# Patient Record
Sex: MALE | Race: WHITE | NOT HISPANIC OR LATINO | Employment: OTHER | URBAN - METROPOLITAN AREA
[De-identification: names, ages, dates, MRNs, and addresses within clinical notes are randomized per-mention and may not be internally consistent; named-entity substitution may affect disease eponyms.]

---

## 2017-04-27 ENCOUNTER — AMBULATORY - HEALTHEAST (OUTPATIENT)
Dept: CARDIOLOGY | Facility: CLINIC | Age: 82
End: 2017-04-27

## 2017-04-27 DIAGNOSIS — I25.10 CAD (CORONARY ARTERY DISEASE): ICD-10-CM

## 2018-03-28 ENCOUNTER — RECORDS - HEALTHEAST (OUTPATIENT)
Dept: LAB | Facility: CLINIC | Age: 83
End: 2018-03-28

## 2018-03-28 LAB
ALBUMIN SERPL-MCNC: 3.3 G/DL (ref 3.5–5)
ALT SERPL W P-5'-P-CCNC: 13 U/L (ref 0–45)
AST SERPL W P-5'-P-CCNC: 18 U/L (ref 0–40)
C REACTIVE PROTEIN LHE: 0.1 MG/DL (ref 0–0.8)
CK SERPL-CCNC: 57 U/L (ref 30–190)
CREAT SERPL-MCNC: 1.03 MG/DL (ref 0.7–1.3)
ERYTHROCYTE [DISTWIDTH] IN BLOOD BY AUTOMATED COUNT: 12.7 % (ref 11–14.5)
ERYTHROCYTE [SEDIMENTATION RATE] IN BLOOD BY WESTERGREN METHOD: 18 MM/HR (ref 0–15)
GFR SERPL CREATININE-BSD FRML MDRD: >60 ML/MIN/1.73M2
HCT VFR BLD AUTO: 44.9 % (ref 40–54)
HGB BLD-MCNC: 14.7 G/DL (ref 14–18)
MCH RBC QN AUTO: 33 PG (ref 27–34)
MCHC RBC AUTO-ENTMCNC: 32.7 G/DL (ref 32–36)
MCV RBC AUTO: 101 FL (ref 80–100)
PLATELET # BLD AUTO: 188 THOU/UL (ref 140–440)
PMV BLD AUTO: 8.8 FL (ref 8.5–12.5)
RBC # BLD AUTO: 4.46 MILL/UL (ref 4.4–6.2)
WBC: 5.7 THOU/UL (ref 4–11)

## 2018-05-30 ENCOUNTER — RECORDS - HEALTHEAST (OUTPATIENT)
Dept: LAB | Facility: CLINIC | Age: 83
End: 2018-05-30

## 2018-05-30 LAB
25(OH)D3 SERPL-MCNC: 86.8 NG/ML (ref 30–80)
ALBUMIN SERPL-MCNC: 4 G/DL (ref 3.5–5)
ALT SERPL W P-5'-P-CCNC: 16 U/L (ref 0–45)
AST SERPL W P-5'-P-CCNC: 20 U/L (ref 0–40)
C REACTIVE PROTEIN LHE: <0.1 MG/DL (ref 0–0.8)
CK SERPL-CCNC: 56 U/L (ref 30–190)
CREAT SERPL-MCNC: 1.31 MG/DL (ref 0.7–1.3)
ERYTHROCYTE [DISTWIDTH] IN BLOOD BY AUTOMATED COUNT: 12.3 % (ref 11–14.5)
ERYTHROCYTE [SEDIMENTATION RATE] IN BLOOD BY WESTERGREN METHOD: 17 MM/HR (ref 0–15)
GFR SERPL CREATININE-BSD FRML MDRD: 53 ML/MIN/1.73M2
HCT VFR BLD AUTO: 47.3 % (ref 40–54)
HGB BLD-MCNC: 15.7 G/DL (ref 14–18)
MCH RBC QN AUTO: 33 PG (ref 27–34)
MCHC RBC AUTO-ENTMCNC: 33.2 G/DL (ref 32–36)
MCV RBC AUTO: 99 FL (ref 80–100)
PLATELET # BLD AUTO: 203 THOU/UL (ref 140–440)
PMV BLD AUTO: 9 FL (ref 8.5–12.5)
RBC # BLD AUTO: 4.76 MILL/UL (ref 4.4–6.2)
WBC: 6.3 THOU/UL (ref 4–11)

## 2018-05-31 LAB — ALDOLASE SERPL-CCNC: 5.3 U/L (ref 1.5–8.1)

## 2018-06-25 ENCOUNTER — RECORDS - HEALTHEAST (OUTPATIENT)
Dept: LAB | Facility: CLINIC | Age: 83
End: 2018-06-25

## 2018-06-25 LAB
ANION GAP SERPL CALCULATED.3IONS-SCNC: 13 MMOL/L (ref 5–18)
BUN SERPL-MCNC: 27 MG/DL (ref 8–28)
CALCIUM SERPL-MCNC: 10 MG/DL (ref 8.5–10.5)
CHLORIDE BLD-SCNC: 101 MMOL/L (ref 98–107)
CO2 SERPL-SCNC: 25 MMOL/L (ref 22–31)
CREAT SERPL-MCNC: 1.21 MG/DL (ref 0.7–1.3)
GFR SERPL CREATININE-BSD FRML MDRD: 58 ML/MIN/1.73M2
GLUCOSE BLD-MCNC: 141 MG/DL (ref 70–125)
POTASSIUM BLD-SCNC: 5 MMOL/L (ref 3.5–5)
PSA SERPL-MCNC: 0.6 NG/ML (ref 0–6.5)
SODIUM SERPL-SCNC: 139 MMOL/L (ref 136–145)

## 2018-07-24 ENCOUNTER — RECORDS - HEALTHEAST (OUTPATIENT)
Dept: LAB | Facility: CLINIC | Age: 83
End: 2018-07-24

## 2018-07-24 LAB
ALBUMIN SERPL-MCNC: 3.9 G/DL (ref 3.5–5)
ALP SERPL-CCNC: 74 U/L (ref 45–120)
ALT SERPL W P-5'-P-CCNC: 25 U/L (ref 0–45)
ANION GAP SERPL CALCULATED.3IONS-SCNC: 11 MMOL/L (ref 5–18)
AST SERPL W P-5'-P-CCNC: 20 U/L (ref 0–40)
BILIRUB SERPL-MCNC: 0.6 MG/DL (ref 0–1)
BUN SERPL-MCNC: 25 MG/DL (ref 8–28)
CALCIUM SERPL-MCNC: 9.9 MG/DL (ref 8.5–10.5)
CHLORIDE BLD-SCNC: 104 MMOL/L (ref 98–107)
CHOLEST SERPL-MCNC: 115 MG/DL
CO2 SERPL-SCNC: 26 MMOL/L (ref 22–31)
CREAT SERPL-MCNC: 1.2 MG/DL (ref 0.7–1.3)
FASTING STATUS PATIENT QL REPORTED: ABNORMAL
GFR SERPL CREATININE-BSD FRML MDRD: 58 ML/MIN/1.73M2
GLUCOSE BLD-MCNC: 158 MG/DL (ref 70–125)
HDLC SERPL-MCNC: 44 MG/DL
LDLC SERPL CALC-MCNC: 39 MG/DL
POTASSIUM BLD-SCNC: 4.6 MMOL/L (ref 3.5–5)
PROT SERPL-MCNC: 7 G/DL (ref 6–8)
SODIUM SERPL-SCNC: 141 MMOL/L (ref 136–145)
TRIGL SERPL-MCNC: 161 MG/DL
TSH SERPL DL<=0.005 MIU/L-ACNC: 1.43 UIU/ML (ref 0.3–5)

## 2018-08-23 ENCOUNTER — RECORDS - HEALTHEAST (OUTPATIENT)
Dept: ADMINISTRATIVE | Facility: OTHER | Age: 83
End: 2018-08-23

## 2018-08-28 ENCOUNTER — AMBULATORY - HEALTHEAST (OUTPATIENT)
Dept: CARDIOLOGY | Facility: CLINIC | Age: 83
End: 2018-08-28

## 2018-08-30 ENCOUNTER — OFFICE VISIT - HEALTHEAST (OUTPATIENT)
Dept: CARDIOLOGY | Facility: CLINIC | Age: 83
End: 2018-08-30

## 2018-08-30 DIAGNOSIS — E78.5 DYSLIPIDEMIA, GOAL LDL BELOW 70: ICD-10-CM

## 2018-08-30 DIAGNOSIS — I25.10 CORONARY ARTERY DISEASE DUE TO LIPID RICH PLAQUE: ICD-10-CM

## 2018-08-30 DIAGNOSIS — I25.83 CORONARY ARTERY DISEASE DUE TO LIPID RICH PLAQUE: ICD-10-CM

## 2018-08-30 ASSESSMENT — MIFFLIN-ST. JEOR: SCORE: 1370.42

## 2018-09-12 ENCOUNTER — RECORDS - HEALTHEAST (OUTPATIENT)
Dept: LAB | Facility: CLINIC | Age: 83
End: 2018-09-12

## 2018-09-12 LAB
ALBUMIN SERPL-MCNC: 3.7 G/DL (ref 3.5–5)
ALT SERPL W P-5'-P-CCNC: 15 U/L (ref 0–45)
AST SERPL W P-5'-P-CCNC: 17 U/L (ref 0–40)
C REACTIVE PROTEIN LHE: <0.1 MG/DL (ref 0–0.8)
CK SERPL-CCNC: 48 U/L (ref 30–190)
CREAT SERPL-MCNC: 1.22 MG/DL (ref 0.7–1.3)
ERYTHROCYTE [DISTWIDTH] IN BLOOD BY AUTOMATED COUNT: 13.1 % (ref 11–14.5)
ERYTHROCYTE [SEDIMENTATION RATE] IN BLOOD BY WESTERGREN METHOD: 12 MM/HR (ref 0–15)
GFR SERPL CREATININE-BSD FRML MDRD: 57 ML/MIN/1.73M2
HCT VFR BLD AUTO: 45.3 % (ref 40–54)
HGB BLD-MCNC: 14.5 G/DL (ref 14–18)
MCH RBC QN AUTO: 32.7 PG (ref 27–34)
MCHC RBC AUTO-ENTMCNC: 32 G/DL (ref 32–36)
MCV RBC AUTO: 102 FL (ref 80–100)
PLATELET # BLD AUTO: 188 THOU/UL (ref 140–440)
PMV BLD AUTO: 8.6 FL (ref 8.5–12.5)
RBC # BLD AUTO: 4.44 MILL/UL (ref 4.4–6.2)
WBC: 6.2 THOU/UL (ref 4–11)

## 2018-09-13 LAB — ALDOLASE SERPL-CCNC: 4.8 U/L (ref 1.5–8.1)

## 2018-11-27 ENCOUNTER — RECORDS - HEALTHEAST (OUTPATIENT)
Dept: LAB | Facility: CLINIC | Age: 83
End: 2018-11-27

## 2018-11-27 LAB
ALBUMIN SERPL-MCNC: 3.8 G/DL (ref 3.5–5)
ALT SERPL W P-5'-P-CCNC: 14 U/L (ref 0–45)
AST SERPL W P-5'-P-CCNC: 18 U/L (ref 0–40)
C REACTIVE PROTEIN LHE: 0.1 MG/DL (ref 0–0.8)
CK SERPL-CCNC: 73 U/L (ref 30–190)
CREAT SERPL-MCNC: 1.25 MG/DL (ref 0.7–1.3)
ERYTHROCYTE [DISTWIDTH] IN BLOOD BY AUTOMATED COUNT: 12.2 % (ref 11–14.5)
ERYTHROCYTE [SEDIMENTATION RATE] IN BLOOD BY WESTERGREN METHOD: 15 MM/HR (ref 0–15)
GFR SERPL CREATININE-BSD FRML MDRD: 55 ML/MIN/1.73M2
HCT VFR BLD AUTO: 44.8 % (ref 40–54)
HGB BLD-MCNC: 14.6 G/DL (ref 14–18)
MCH RBC QN AUTO: 33.2 PG (ref 27–34)
MCHC RBC AUTO-ENTMCNC: 32.6 G/DL (ref 32–36)
MCV RBC AUTO: 102 FL (ref 80–100)
PLATELET # BLD AUTO: 180 THOU/UL (ref 140–440)
PMV BLD AUTO: 8.6 FL (ref 8.5–12.5)
RBC # BLD AUTO: 4.4 MILL/UL (ref 4.4–6.2)
WBC: 4.9 THOU/UL (ref 4–11)

## 2018-11-28 LAB — ALDOLASE SERPL-CCNC: 2.8 U/L (ref 1.5–8.1)

## 2019-03-06 ENCOUNTER — RECORDS - HEALTHEAST (OUTPATIENT)
Dept: LAB | Facility: CLINIC | Age: 84
End: 2019-03-06

## 2019-03-06 LAB
ALBUMIN SERPL-MCNC: 3.7 G/DL (ref 3.5–5)
ALT SERPL W P-5'-P-CCNC: 20 U/L (ref 0–45)
AST SERPL W P-5'-P-CCNC: 20 U/L (ref 0–40)
C REACTIVE PROTEIN LHE: <0.1 MG/DL (ref 0–0.8)
CK SERPL-CCNC: 64 U/L (ref 30–190)
CREAT SERPL-MCNC: 1.16 MG/DL (ref 0.7–1.3)
ERYTHROCYTE [DISTWIDTH] IN BLOOD BY AUTOMATED COUNT: 12.4 % (ref 11–14.5)
ERYTHROCYTE [SEDIMENTATION RATE] IN BLOOD BY WESTERGREN METHOD: 14 MM/HR (ref 0–15)
GFR SERPL CREATININE-BSD FRML MDRD: 60 ML/MIN/1.73M2
HCT VFR BLD AUTO: 45.5 % (ref 40–54)
HGB BLD-MCNC: 14.7 G/DL (ref 14–18)
MCH RBC QN AUTO: 32.9 PG (ref 27–34)
MCHC RBC AUTO-ENTMCNC: 32.3 G/DL (ref 32–36)
MCV RBC AUTO: 102 FL (ref 80–100)
PLATELET # BLD AUTO: 209 THOU/UL (ref 140–440)
PMV BLD AUTO: 8.8 FL (ref 8.5–12.5)
RBC # BLD AUTO: 4.47 MILL/UL (ref 4.4–6.2)
WBC: 6.1 THOU/UL (ref 4–11)

## 2019-03-07 LAB — ALDOLASE SERPL-CCNC: 5.8 U/L (ref 1.5–8.1)

## 2019-06-10 ENCOUNTER — RECORDS - HEALTHEAST (OUTPATIENT)
Dept: LAB | Facility: CLINIC | Age: 84
End: 2019-06-10

## 2019-06-10 LAB
25(OH)D3 SERPL-MCNC: 73.6 NG/ML (ref 30–80)
ALBUMIN SERPL-MCNC: 3.6 G/DL (ref 3.5–5)
ALT SERPL W P-5'-P-CCNC: 16 U/L (ref 0–45)
AST SERPL W P-5'-P-CCNC: 18 U/L (ref 0–40)
C REACTIVE PROTEIN LHE: <0.1 MG/DL (ref 0–0.8)
CK SERPL-CCNC: 62 U/L (ref 30–190)
CREAT SERPL-MCNC: 0.97 MG/DL (ref 0.7–1.3)
ERYTHROCYTE [DISTWIDTH] IN BLOOD BY AUTOMATED COUNT: 13.4 % (ref 11–14.5)
ERYTHROCYTE [SEDIMENTATION RATE] IN BLOOD BY WESTERGREN METHOD: 13 MM/HR (ref 0–15)
GFR SERPL CREATININE-BSD FRML MDRD: >60 ML/MIN/1.73M2
HCT VFR BLD AUTO: 43.5 % (ref 40–54)
HGB BLD-MCNC: 14.7 G/DL (ref 14–18)
MCH RBC QN AUTO: 33.1 PG (ref 27–34)
MCHC RBC AUTO-ENTMCNC: 33.8 G/DL (ref 32–36)
MCV RBC AUTO: 98 FL (ref 80–100)
PLATELET # BLD AUTO: 219 THOU/UL (ref 140–440)
PMV BLD AUTO: 7.1 FL (ref 7–10)
RBC # BLD AUTO: 4.44 MILL/UL (ref 4.4–6.2)
WBC: 6.5 THOU/UL (ref 4–11)

## 2019-06-11 LAB — ALDOLASE SERPL-CCNC: 1.6 U/L (ref 1.5–8.1)

## 2019-07-25 ENCOUNTER — COMMUNICATION - HEALTHEAST (OUTPATIENT)
Dept: ADMINISTRATIVE | Facility: CLINIC | Age: 84
End: 2019-07-25

## 2019-09-11 ENCOUNTER — RECORDS - HEALTHEAST (OUTPATIENT)
Dept: LAB | Facility: CLINIC | Age: 84
End: 2019-09-11

## 2019-09-11 LAB
ALBUMIN SERPL-MCNC: 3.8 G/DL (ref 3.5–5)
ALT SERPL W P-5'-P-CCNC: 11 U/L (ref 0–45)
AST SERPL W P-5'-P-CCNC: 19 U/L (ref 0–40)
C REACTIVE PROTEIN LHE: <0.1 MG/DL (ref 0–0.8)
CK SERPL-CCNC: 47 U/L (ref 30–190)
CREAT SERPL-MCNC: 1.29 MG/DL (ref 0.7–1.3)
ERYTHROCYTE [DISTWIDTH] IN BLOOD BY AUTOMATED COUNT: 12.7 % (ref 11–14.5)
ERYTHROCYTE [SEDIMENTATION RATE] IN BLOOD BY WESTERGREN METHOD: 20 MM/HR (ref 0–15)
GFR SERPL CREATININE-BSD FRML MDRD: 53 ML/MIN/1.73M2
HCT VFR BLD AUTO: 44.9 % (ref 40–54)
HGB BLD-MCNC: 14.5 G/DL (ref 14–18)
MCH RBC QN AUTO: 33 PG (ref 27–34)
MCHC RBC AUTO-ENTMCNC: 32.3 G/DL (ref 32–36)
MCV RBC AUTO: 102 FL (ref 80–100)
PLATELET # BLD AUTO: 195 THOU/UL (ref 140–440)
PMV BLD AUTO: 9.3 FL (ref 8.5–12.5)
RBC # BLD AUTO: 4.39 MILL/UL (ref 4.4–6.2)
WBC: 5.9 THOU/UL (ref 4–11)

## 2019-09-12 ENCOUNTER — RECORDS - HEALTHEAST (OUTPATIENT)
Dept: LAB | Facility: CLINIC | Age: 84
End: 2019-09-12

## 2019-09-12 LAB
ALBUMIN SERPL-MCNC: 3.8 G/DL (ref 3.5–5)
ALDOLASE SERPL-CCNC: 3.9 U/L (ref 1.5–8.1)
ALP SERPL-CCNC: 91 U/L (ref 45–120)
ALT SERPL W P-5'-P-CCNC: 19 U/L (ref 0–45)
ANION GAP SERPL CALCULATED.3IONS-SCNC: 9 MMOL/L (ref 5–18)
AST SERPL W P-5'-P-CCNC: 20 U/L (ref 0–40)
BILIRUB SERPL-MCNC: 0.6 MG/DL (ref 0–1)
BUN SERPL-MCNC: 30 MG/DL (ref 8–28)
CALCIUM SERPL-MCNC: 9.6 MG/DL (ref 8.5–10.5)
CHLORIDE BLD-SCNC: 105 MMOL/L (ref 98–107)
CHOLEST SERPL-MCNC: 100 MG/DL
CO2 SERPL-SCNC: 27 MMOL/L (ref 22–31)
CREAT SERPL-MCNC: 1.36 MG/DL (ref 0.7–1.3)
FASTING STATUS PATIENT QL REPORTED: ABNORMAL
GFR SERPL CREATININE-BSD FRML MDRD: 50 ML/MIN/1.73M2
GLUCOSE BLD-MCNC: 128 MG/DL (ref 70–125)
HDLC SERPL-MCNC: 43 MG/DL
LDLC SERPL CALC-MCNC: 23 MG/DL
POTASSIUM BLD-SCNC: 4.5 MMOL/L (ref 3.5–5)
PROT SERPL-MCNC: 6.8 G/DL (ref 6–8)
SODIUM SERPL-SCNC: 141 MMOL/L (ref 136–145)
TRIGL SERPL-MCNC: 172 MG/DL
TSH SERPL DL<=0.005 MIU/L-ACNC: 1.49 UIU/ML (ref 0.3–5)
VIT B12 SERPL-MCNC: 1154 PG/ML (ref 213–816)

## 2019-09-13 LAB — HBA1C MFR BLD: 6.1 % (ref 4.2–6.1)

## 2019-12-13 ENCOUNTER — RECORDS - HEALTHEAST (OUTPATIENT)
Dept: LAB | Facility: CLINIC | Age: 84
End: 2019-12-13

## 2019-12-13 LAB
ALBUMIN SERPL-MCNC: 3.8 G/DL (ref 3.5–5)
ALT SERPL W P-5'-P-CCNC: 16 U/L (ref 0–45)
AST SERPL W P-5'-P-CCNC: 19 U/L (ref 0–40)
C REACTIVE PROTEIN LHE: 0.1 MG/DL (ref 0–0.8)
CK SERPL-CCNC: 56 U/L (ref 30–190)
CREAT SERPL-MCNC: 1.25 MG/DL (ref 0.7–1.3)
ERYTHROCYTE [DISTWIDTH] IN BLOOD BY AUTOMATED COUNT: 13 % (ref 11–14.5)
ERYTHROCYTE [SEDIMENTATION RATE] IN BLOOD BY WESTERGREN METHOD: 16 MM/HR (ref 0–15)
GFR SERPL CREATININE-BSD FRML MDRD: 55 ML/MIN/1.73M2
HCT VFR BLD AUTO: 45.2 % (ref 40–54)
HGB BLD-MCNC: 14.4 G/DL (ref 14–18)
MCH RBC QN AUTO: 32.7 PG (ref 27–34)
MCHC RBC AUTO-ENTMCNC: 31.9 G/DL (ref 32–36)
MCV RBC AUTO: 103 FL (ref 80–100)
PLATELET # BLD AUTO: 204 THOU/UL (ref 140–440)
PMV BLD AUTO: 8.7 FL (ref 8.5–12.5)
RBC # BLD AUTO: 4.41 MILL/UL (ref 4.4–6.2)
WBC: 6 THOU/UL (ref 4–11)

## 2019-12-14 LAB — ALDOLASE SERPL-CCNC: 4 U/L (ref 1.5–8.1)

## 2020-01-30 ENCOUNTER — RECORDS - HEALTHEAST (OUTPATIENT)
Dept: LAB | Facility: CLINIC | Age: 85
End: 2020-01-30

## 2020-01-30 LAB — C REACTIVE PROTEIN LHE: <0.1 MG/DL (ref 0–0.8)

## 2020-03-10 ENCOUNTER — RECORDS - HEALTHEAST (OUTPATIENT)
Dept: LAB | Facility: CLINIC | Age: 85
End: 2020-03-10

## 2020-03-10 LAB
ANION GAP SERPL CALCULATED.3IONS-SCNC: 11 MMOL/L (ref 5–18)
BUN SERPL-MCNC: 28 MG/DL (ref 8–28)
CALCIUM SERPL-MCNC: 10.2 MG/DL (ref 8.5–10.5)
CHLORIDE BLD-SCNC: 103 MMOL/L (ref 98–107)
CO2 SERPL-SCNC: 28 MMOL/L (ref 22–31)
CREAT SERPL-MCNC: 1.13 MG/DL (ref 0.7–1.3)
GFR SERPL CREATININE-BSD FRML MDRD: >60 ML/MIN/1.73M2
GLUCOSE BLD-MCNC: 112 MG/DL (ref 70–125)
POTASSIUM BLD-SCNC: 4.5 MMOL/L (ref 3.5–5)
SODIUM SERPL-SCNC: 142 MMOL/L (ref 136–145)

## 2020-11-02 ENCOUNTER — RECORDS - HEALTHEAST (OUTPATIENT)
Dept: LAB | Facility: CLINIC | Age: 85
End: 2020-11-02

## 2020-11-02 LAB
ALBUMIN SERPL-MCNC: 3.6 G/DL (ref 3.5–5)
ALP SERPL-CCNC: 71 U/L (ref 45–120)
ALT SERPL W P-5'-P-CCNC: 12 U/L (ref 0–45)
ANION GAP SERPL CALCULATED.3IONS-SCNC: 9 MMOL/L (ref 5–18)
AST SERPL W P-5'-P-CCNC: 17 U/L (ref 0–40)
BILIRUB SERPL-MCNC: 0.5 MG/DL (ref 0–1)
BUN SERPL-MCNC: 24 MG/DL (ref 8–28)
CALCIUM SERPL-MCNC: 9.8 MG/DL (ref 8.5–10.5)
CHLORIDE BLD-SCNC: 105 MMOL/L (ref 98–107)
CHOLEST SERPL-MCNC: 107 MG/DL
CO2 SERPL-SCNC: 29 MMOL/L (ref 22–31)
CREAT SERPL-MCNC: 1.12 MG/DL (ref 0.7–1.3)
FASTING STATUS PATIENT QL REPORTED: NORMAL
GFR SERPL CREATININE-BSD FRML MDRD: >60 ML/MIN/1.73M2
GLUCOSE BLD-MCNC: 106 MG/DL (ref 70–125)
HDLC SERPL-MCNC: 44 MG/DL
LDLC SERPL CALC-MCNC: 42 MG/DL
POTASSIUM BLD-SCNC: 5.2 MMOL/L (ref 3.5–5)
PROT SERPL-MCNC: 6.5 G/DL (ref 6–8)
SODIUM SERPL-SCNC: 143 MMOL/L (ref 136–145)
TRIGL SERPL-MCNC: 103 MG/DL

## 2021-06-01 VITALS — BODY MASS INDEX: 25.11 KG/M2 | HEIGHT: 67 IN | WEIGHT: 160 LBS

## 2021-06-03 ENCOUNTER — RECORDS - HEALTHEAST (OUTPATIENT)
Dept: ADMINISTRATIVE | Facility: CLINIC | Age: 86
End: 2021-06-03

## 2021-06-19 NOTE — LETTER
Letter by Chandler Crockett MD at      Author: Chandler Crockett MD Service: -- Author Type: --    Filed:  Encounter Date: 7/25/2019 Status: (Other)         Juan Tee  [address]  [facility] [place] [address]      July 25, 2019      Dear Juan,    This letter is to remind you that you will be due for your follow up appointment with Dr. Chandler Crockett  . To help ensure you are in the best health possible, a regular follow-up with your cardiologist is essential.     Please call our Patient Scheduling Line at [phone] to schedule your appointment at your earliest convenience.  If you have recently scheduled an appointment, please disregard this letter.    We look forward to seeing you again. As always, we are available at the number  above for any questions or concerns you may have.      Sincerely,     The Physicians and Staff of [facility] [place]

## 2021-06-26 NOTE — PROGRESS NOTES
Progress Notes by Chandler Crockett MD at 8/30/2018  8:30 AM     Author: Chandler Crockett MD Service: -- Author Type: Physician    Filed: 8/30/2018  9:03 AM Encounter Date: 8/30/2018 Status: Signed    : Chandler Crockett MD (Physician)           Click to link to [facility]     [facility] Clinic Note      Assessment:    1. Coronary artery disease due to lipid rich plaque     2. Dyslipidemia, goal LDL below 70         Plan:    1. Return to see Dr. Crockett in 1 year.    An After Visit Summary was printed and given to the patient.    Subjective:    Juan Tee returned for a post hospital follow up visit.  His niece, Luz Elena, accompanied him to the visit.    Juan was [facility] this [facility] with palpitations and lightheadedness.  Those symptoms have resolved.  I personally reviewed his nuclear stress test which continues to demonstrate evidence of low risk ischemia.  His ejection fraction was normal by nuclear scanning but apparently reduced by echocardiography.  Despite that his BNP level was normal for his age.  He has lost over 20 pounds of weight in the last few years and he attributes this to the fact that his sister-in-law used to give him her food when they went out to eat after she went to chemotherapy.  She has since passed away and he lives alone in her home.  His niece, Luz Elena, stops by for a few hours every day.    He still is under the care of Dr. Ziyad Liu for polymyositis.  We reviewed his medication list in the hospital thought he was taking 50 mg of spironolactone every morning.  The bottle from Dr. Liu states 12-1/2 mg once daily and Dr. Steward's notes indicate 12-1/2 mg twice daily.  Juan states he takes the spironolactone according to Dr. Steward's direction.  Therefore I corrected our [facility] medication list.    Otherwise, on system review Juan describes nocturia and occasional dizziness.  Does not report angina pectoris or  shortness of breath.    Past Medical History:    Patient Active Problem List   Diagnosis   ? Dyslipidemia, goal LDL below 70   ? Coronary artery disease due to lipid rich plaque   ? Typical Polymyositis (Type I)       Past Medical History:   Diagnosis Date   ? Coronary artery disease due to lipid rich plaque 2011    based on abnormal nuclear stress test     ? Dyslipidemia, goal LDL below 70 2011   ? Typical Polymyositis (Type I) 2000    sees Dr. Liu        Past Surgical History:   Procedure Laterality Date   ? [place] OPEN RX PATELLA FX      Treatment Of Knee Fracture Patellar, Open        reports that he quit smoking about 56 years ago. His smoking use included Cigarettes. He has a 8.00 pack-year smoking history. He has never used smokeless tobacco. He reports that he drinks alcohol. He reports that he does not use illicit drugs.    Family History   Problem Relation Age of Onset   ? Kidney failure Mother 54   ? Stroke Father 68   ? Sudden death Father 72      in the doctor's office   ? Congenital heart disease Brother 0      at 26   ? Acute Myocardial Infarction Brother 68   ? Coronary artery disease Brother    ? CABG Brother 72   ? Rheumatic fever Sister    ? No Medical Problems Sister    ? No Medical Problems Sister    ? No Medical Problems Sister    ? No Medical Problems Sister        Outpatient Encounter Prescriptions as of 2018   Medication Sig Dispense Refill   ? aspirin 81 mg chewable tablet Chew 81 mg daily.     ? atorvastatin (LIPITOR) 20 MG tablet Take 20 mg by mouth bedtime.     ? azaTHIOprine (IMURAN) 50 mg tablet Take 50 mg by mouth daily.     ? CALCIUM ORAL Take 1 tablet by mouth 2 (two) times a day.     ? cholecalciferol, vitamin D3, 5,000 unit capsule Take 5,000 Units by mouth daily.     ? DOCOSAHEXANOIC ACID/EPA (FISH OIL ORAL) Take 1,200 mg by mouth daily.     ? FOLIC ACID/MULTIVIT-MIN/LUTEIN (CENTRUM SILVER ORAL) Take 1 tablet by mouth daily.     ? metoprolol tartrate  "(LOPRESSOR) 25 MG tablet Take 0.5 tablets (12.5 mg total) by mouth 2 (two) times a day. 30 tablet 0   ? spironolactone (ALDACTONE) 25 MG tablet Take 12.5 mg by mouth 2 (two) times a day at 9am and 6pm.      ? VIT C/E/ZN/COPPR/LUTEIN/ZEAXAN (PRESERVISION AREDS 2 ORAL) Take 1 capsule by mouth 2 (two) times a day.       No facility-administered encounter medications on file as of 8/30/2018.        Review of Systems:     General: Weight Loss  Eyes: WNL  Ears/Nose/Throat: WNL  Lungs: WNL  Heart: Leg Swelling  Stomach: WNL  Bladder: Frequent Urination at Night  Muscle/Joints: WNL  Skin: WNL  Nervous System: Dizziness  Mental Health: WNL     Blood: WNL    Objective:     /66 (Patient Site: Left Arm, Patient Position: Sitting, Cuff Size: Adult Regular)  Pulse 80  Resp 16  Ht 5' 6.75\" (1.695 m)  Wt 160 lb (72.6 kg)  BMI 25.25 kg/m2  Wt Readings from Last 5 Encounters:   08/30/18 160 lb (72.6 kg)   07/18/18 155 lb 8 oz (70.5 kg)   06/13/17 167 lb (75.8 kg)   05/27/16 178 lb (80.7 kg)   11/05/15 178 lb (80.7 kg)        Physical Exam:    GENERAL APPEARANCE: alert, no apparent distress  EYES: no scleral icterus  NECK: no carotid bruits or adenopathy, jugular venous pressure is less than 5 cm  CHEST: symmetric, the lungs are clear to auscultation  CARDIOVASCULAR: regular rhythm without murmur or gallop  EXTREMITIES: no cyanosis, clubbing; 2+ pitting lower leg edema  SKIN: no xanthelasma  NEUROLOGIC: he required the assistance of one to get on and off the examination table and walks slowly  PSYCHIATRIC: mood and affect are normal    Cardiac Testing:    EKG: Sinus rhythm, normal EKG per my personal review.    Echocardiogram:   Results for orders placed during the hospital encounter of 07/16/18   Echo Complete [ECH10] 07/17/2018    Narrative   When compared to the previous study dated 9/11/2013, there are changes   noted. Left ventricular systolic function has decreased.    Left ventricle ejection fraction is mildly " decreased. The calculated   left ventricular ejection fraction is 43%.    Normal left ventricular size.    Right Ventricle: The right ventricle is mildly dilated. The systolic   function appears mildly reduced.    Mild aortic regurgitation.           Results for orders placed during the hospital encounter of 07/16/18   NM Pharmacologic Stress Test [VKN071] 07/18/2018    Narrative   The pharmacologic nuclear stress test is abnormal.    There is a small area of ischemia in the inferior and apical segment(s)   of the left ventricle.    The patient is at a low risk of future cardiac ischemic events.    The left ventricular ejection fraction is 63%.    When compared to the images of 11/27/2015, the ischemic defect is   slightly larger.    The findings of this examination were communicated to the nursing staff   at [facility].          Imaging:    No results found.    Lab Results:    Lab Results   Component Value Date    CREATININE 1.20 07/24/2018    BUN 25 07/24/2018     07/24/2018    K 4.6 07/24/2018     07/24/2018    CO2 26 07/24/2018     Lab Results   Component Value Date    CHOL 115 07/24/2018    TRIG 161 (H) 07/24/2018    HDL 44 07/24/2018    LDLDIRECT 37 11/05/2015     BNP (pg/mL)   Date Value   07/16/2018 112 (H)   05/04/2016 108 (H)   11/05/2015 136 (H)     Creatinine (mg/dL)   Date Value   07/24/2018 1.20   07/16/2018 1.25   06/25/2018 1.21   05/30/2018 1.31 (H)     Magnesium (mg/dL)   Date Value   07/16/2018 1.9     LDL Calculated (mg/dL)   Date Value   07/24/2018 39   11/01/2016 35   12/01/2015 49     Lab Results   Component Value Date    WBC 5.9 07/16/2018    WBC 4.7 07/07/2015    HGB 14.1 07/16/2018    HCT 42.2 07/16/2018    MCV 99 07/16/2018     07/16/2018           Much or all of the text in this note was generated through the use of the Dragon Dictate voice-to-text software. Errors in spelling or words which seem out of context are unintentional. Sound alike errors, in  particular, may have escaped editing.

## 2022-01-01 ENCOUNTER — APPOINTMENT (OUTPATIENT)
Dept: CT IMAGING | Facility: CLINIC | Age: 87
End: 2022-01-01
Attending: EMERGENCY MEDICINE
Payer: MEDICARE

## 2022-01-01 ENCOUNTER — APPOINTMENT (OUTPATIENT)
Dept: PHYSICAL THERAPY | Facility: CLINIC | Age: 87
End: 2022-01-01
Attending: INTERNAL MEDICINE
Payer: MEDICARE

## 2022-01-01 ENCOUNTER — TRANSITIONAL CARE UNIT VISIT (OUTPATIENT)
Dept: GERIATRICS | Facility: CLINIC | Age: 87
End: 2022-01-01
Payer: MEDICARE

## 2022-01-01 ENCOUNTER — APPOINTMENT (OUTPATIENT)
Dept: RADIOLOGY | Facility: CLINIC | Age: 87
End: 2022-01-01
Attending: EMERGENCY MEDICINE
Payer: MEDICARE

## 2022-01-01 ENCOUNTER — APPOINTMENT (OUTPATIENT)
Dept: OCCUPATIONAL THERAPY | Facility: CLINIC | Age: 87
End: 2022-01-01
Attending: INTERNAL MEDICINE
Payer: MEDICARE

## 2022-01-01 ENCOUNTER — APPOINTMENT (OUTPATIENT)
Dept: OCCUPATIONAL THERAPY | Facility: CLINIC | Age: 87
End: 2022-01-01
Payer: MEDICARE

## 2022-01-01 ENCOUNTER — HEALTH MAINTENANCE LETTER (OUTPATIENT)
Age: 87
End: 2022-01-01

## 2022-01-01 ENCOUNTER — HOSPITAL ENCOUNTER (OUTPATIENT)
Facility: CLINIC | Age: 87
Setting detail: OBSERVATION
Discharge: NURSING, REHAB OR LONG-TERM CARE | End: 2022-12-22
Attending: EMERGENCY MEDICINE | Admitting: INTERNAL MEDICINE
Payer: MEDICARE

## 2022-01-01 ENCOUNTER — APPOINTMENT (OUTPATIENT)
Dept: PHYSICAL THERAPY | Facility: CLINIC | Age: 87
End: 2022-01-01
Payer: MEDICARE

## 2022-01-01 ENCOUNTER — DOCUMENTATION ONLY (OUTPATIENT)
Dept: OTHER | Facility: CLINIC | Age: 87
End: 2022-01-01

## 2022-01-01 VITALS
RESPIRATION RATE: 16 BRPM | HEART RATE: 58 BPM | BODY MASS INDEX: 26.56 KG/M2 | TEMPERATURE: 97.4 F | WEIGHT: 149.91 LBS | OXYGEN SATURATION: 95 % | HEIGHT: 63 IN | SYSTOLIC BLOOD PRESSURE: 127 MMHG | DIASTOLIC BLOOD PRESSURE: 59 MMHG

## 2022-01-01 VITALS
HEIGHT: 66 IN | TEMPERATURE: 97 F | WEIGHT: 155 LBS | DIASTOLIC BLOOD PRESSURE: 66 MMHG | SYSTOLIC BLOOD PRESSURE: 135 MMHG | OXYGEN SATURATION: 98 % | HEART RATE: 64 BPM | RESPIRATION RATE: 16 BRPM | BODY MASS INDEX: 24.91 KG/M2

## 2022-01-01 DIAGNOSIS — F02.818 LEWY BODY DEMENTIA WITH OTHER BEHAVIORAL DISTURBANCE, UNSPECIFIED DEMENTIA SEVERITY (H): ICD-10-CM

## 2022-01-01 DIAGNOSIS — F03.918 SENILE DEMENTIA, WITH BEHAVIORAL DISTURBANCE (H): ICD-10-CM

## 2022-01-01 DIAGNOSIS — G20.A1 PARKINSONS DISEASE (H): ICD-10-CM
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
## 2022-01-01 DIAGNOSIS — K59.00 CONSTIPATION, UNSPECIFIED CONSTIPATION TYPE: Primary | ICD-10-CM

## 2022-01-01 DIAGNOSIS — R53.81 PHYSICAL DECONDITIONING: ICD-10-CM

## 2022-01-01 DIAGNOSIS — N18.2 CKD (CHRONIC KIDNEY DISEASE) STAGE 2, GFR 60-89 ML/MIN: ICD-10-CM

## 2022-01-01 DIAGNOSIS — Z74.09 IMPAIRED MOBILITY AND ACTIVITIES OF DAILY LIVING: ICD-10-CM

## 2022-01-01 DIAGNOSIS — G31.83 LEWY BODY DEMENTIA WITH OTHER BEHAVIORAL DISTURBANCE, UNSPECIFIED DEMENTIA SEVERITY (H): ICD-10-CM
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
## 2022-01-01 DIAGNOSIS — Z78.9 IMPAIRED MOBILITY AND ACTIVITIES OF DAILY LIVING: ICD-10-CM

## 2022-01-01 DIAGNOSIS — G20.A1 PARKINSON'S DISEASE (H): ICD-10-CM

## 2022-01-01 DIAGNOSIS — S70.01XA: ICD-10-CM

## 2022-01-01 DIAGNOSIS — M25.551 HIP PAIN, RIGHT: ICD-10-CM

## 2022-01-01 LAB
ALBUMIN UR-MCNC: NEGATIVE MG/DL
ANION GAP SERPL CALCULATED.3IONS-SCNC: 5 MMOL/L (ref 5–18)
ANION GAP SERPL CALCULATED.3IONS-SCNC: 8 MMOL/L (ref 5–18)
APPEARANCE UR: CLEAR
BASOPHILS # BLD AUTO: 0 10E3/UL (ref 0–0.2)
BASOPHILS NFR BLD AUTO: 0 %
BILIRUB UR QL STRIP: NEGATIVE
BUN SERPL-MCNC: 25 MG/DL (ref 8–28)
BUN SERPL-MCNC: 26 MG/DL (ref 8–28)
CALCIUM SERPL-MCNC: 9.1 MG/DL (ref 8.5–10.5)
CALCIUM SERPL-MCNC: 9.5 MG/DL (ref 8.5–10.5)
CHLORIDE BLD-SCNC: 104 MMOL/L (ref 98–107)
CHLORIDE BLD-SCNC: 105 MMOL/L (ref 98–107)
CO2 SERPL-SCNC: 28 MMOL/L (ref 22–31)
CO2 SERPL-SCNC: 32 MMOL/L (ref 22–31)
COLOR UR AUTO: YELLOW
CREAT SERPL-MCNC: 1.02 MG/DL (ref 0.7–1.3)
CREAT SERPL-MCNC: 1.21 MG/DL (ref 0.7–1.3)
EOSINOPHIL # BLD AUTO: 0.2 10E3/UL (ref 0–0.7)
EOSINOPHIL NFR BLD AUTO: 3 %
ERYTHROCYTE [DISTWIDTH] IN BLOOD BY AUTOMATED COUNT: 13.1 % (ref 10–15)
ERYTHROCYTE [DISTWIDTH] IN BLOOD BY AUTOMATED COUNT: 13.2 % (ref 10–15)
GFR SERPL CREATININE-BSD FRML MDRD: 58 ML/MIN/1.73M2
GFR SERPL CREATININE-BSD FRML MDRD: 72 ML/MIN/1.73M2
GLUCOSE BLD-MCNC: 119 MG/DL (ref 70–125)
GLUCOSE BLD-MCNC: 88 MG/DL (ref 70–125)
GLUCOSE BLDC GLUCOMTR-MCNC: 98 MG/DL (ref 70–99)
GLUCOSE UR STRIP-MCNC: NEGATIVE MG/DL
HCT VFR BLD AUTO: 38 % (ref 40–53)
HCT VFR BLD AUTO: 42 % (ref 40–53)
HGB BLD-MCNC: 12.1 G/DL (ref 13.3–17.7)
HGB BLD-MCNC: 13.3 G/DL (ref 13.3–17.7)
HGB UR QL STRIP: NEGATIVE
HOLD SPECIMEN: NORMAL
HYALINE CASTS: 14 /LPF
IMM GRANULOCYTES # BLD: 0 10E3/UL
IMM GRANULOCYTES NFR BLD: 0 %
KETONES UR STRIP-MCNC: NEGATIVE MG/DL
LEUKOCYTE ESTERASE UR QL STRIP: NEGATIVE
LYMPHOCYTES # BLD AUTO: 1.3 10E3/UL (ref 0.8–5.3)
LYMPHOCYTES NFR BLD AUTO: 23 %
MAGNESIUM SERPL-MCNC: 1.9 MG/DL (ref 1.8–2.6)
MCH RBC QN AUTO: 31.6 PG (ref 26.5–33)
MCH RBC QN AUTO: 31.7 PG (ref 26.5–33)
MCHC RBC AUTO-ENTMCNC: 31.7 G/DL (ref 31.5–36.5)
MCHC RBC AUTO-ENTMCNC: 31.8 G/DL (ref 31.5–36.5)
MCV RBC AUTO: 100 FL (ref 78–100)
MCV RBC AUTO: 99 FL (ref 78–100)
MONOCYTES # BLD AUTO: 0.6 10E3/UL (ref 0–1.3)
MONOCYTES NFR BLD AUTO: 11 %
MUCOUS THREADS #/AREA URNS LPF: PRESENT /LPF
NEUTROPHILS # BLD AUTO: 3.5 10E3/UL (ref 1.6–8.3)
NEUTROPHILS NFR BLD AUTO: 63 %
NITRATE UR QL: NEGATIVE
NRBC # BLD AUTO: 0 10E3/UL
NRBC BLD AUTO-RTO: 0 /100
PH UR STRIP: 6 [PH] (ref 5–7)
PLATELET # BLD AUTO: 203 10E3/UL (ref 150–450)
PLATELET # BLD AUTO: 213 10E3/UL (ref 150–450)
POTASSIUM BLD-SCNC: 4.2 MMOL/L (ref 3.5–5)
POTASSIUM BLD-SCNC: 4.3 MMOL/L (ref 3.5–5)
RBC # BLD AUTO: 3.83 10E6/UL (ref 4.4–5.9)
RBC # BLD AUTO: 4.2 10E6/UL (ref 4.4–5.9)
RBC URINE: 1 /HPF
SODIUM SERPL-SCNC: 141 MMOL/L (ref 136–145)
SODIUM SERPL-SCNC: 141 MMOL/L (ref 136–145)
SP GR UR STRIP: 1.02 (ref 1–1.03)
SQUAMOUS EPITHELIAL: <1 /HPF
UROBILINOGEN UR STRIP-MCNC: <2 MG/DL
WBC # BLD AUTO: 5.7 10E3/UL (ref 4–11)
WBC # BLD AUTO: 6.3 10E3/UL (ref 4–11)
WBC URINE: 2 /HPF

## 2022-01-01 PROCEDURE — 250N000013 HC RX MED GY IP 250 OP 250 PS 637: Performed by: INTERNAL MEDICINE

## 2022-01-01 PROCEDURE — 82310 ASSAY OF CALCIUM: CPT | Performed by: EMERGENCY MEDICINE

## 2022-01-01 PROCEDURE — 99225 PR SUBSEQUENT OBSERVATION CARE,LEVEL II: CPT | Performed by: INTERNAL MEDICINE

## 2022-01-01 PROCEDURE — G0378 HOSPITAL OBSERVATION PER HR: HCPCS

## 2022-01-01 PROCEDURE — 250N000012 HC RX MED GY IP 250 OP 636 PS 637: Performed by: INTERNAL MEDICINE

## 2022-01-01 PROCEDURE — G1010 CDSM STANSON: HCPCS

## 2022-01-01 PROCEDURE — 85025 COMPLETE CBC W/AUTO DIFF WBC: CPT | Performed by: EMERGENCY MEDICINE

## 2022-01-01 PROCEDURE — 250N000009 HC RX 250: Performed by: INTERNAL MEDICINE

## 2022-01-01 PROCEDURE — 83735 ASSAY OF MAGNESIUM: CPT | Performed by: EMERGENCY MEDICINE

## 2022-01-01 PROCEDURE — 71046 X-RAY EXAM CHEST 2 VIEWS: CPT

## 2022-01-01 PROCEDURE — 250N000013 HC RX MED GY IP 250 OP 250 PS 637: Performed by: EMERGENCY MEDICINE

## 2022-01-01 PROCEDURE — 99310 SBSQ NF CARE HIGH MDM 45: CPT | Performed by: PHYSICIAN ASSISTANT

## 2022-01-01 PROCEDURE — 97535 SELF CARE MNGMENT TRAINING: CPT | Mod: GO

## 2022-01-01 PROCEDURE — G0463 HOSPITAL OUTPT CLINIC VISIT: HCPCS

## 2022-01-01 PROCEDURE — 99219 PR INITIAL OBSERVATION CARE,LEVEL II: CPT | Performed by: INTERNAL MEDICINE

## 2022-01-01 PROCEDURE — 81001 URINALYSIS AUTO W/SCOPE: CPT | Performed by: EMERGENCY MEDICINE

## 2022-01-01 PROCEDURE — 97110 THERAPEUTIC EXERCISES: CPT | Mod: GP

## 2022-01-01 PROCEDURE — 97129 THER IVNTJ 1ST 15 MIN: CPT | Mod: GO

## 2022-01-01 PROCEDURE — 97165 OT EVAL LOW COMPLEX 30 MIN: CPT | Mod: GO

## 2022-01-01 PROCEDURE — 80048 BASIC METABOLIC PNL TOTAL CA: CPT | Performed by: INTERNAL MEDICINE

## 2022-01-01 PROCEDURE — 99217 PR OBSERVATION CARE DISCHARGE: CPT | Performed by: INTERNAL MEDICINE

## 2022-01-01 PROCEDURE — 73502 X-RAY EXAM HIP UNI 2-3 VIEWS: CPT

## 2022-01-01 PROCEDURE — 97116 GAIT TRAINING THERAPY: CPT | Mod: GP

## 2022-01-01 PROCEDURE — 97162 PT EVAL MOD COMPLEX 30 MIN: CPT | Mod: GP

## 2022-01-01 PROCEDURE — 85014 HEMATOCRIT: CPT | Performed by: INTERNAL MEDICINE

## 2022-01-01 PROCEDURE — 82962 GLUCOSE BLOOD TEST: CPT

## 2022-01-01 PROCEDURE — 36415 COLL VENOUS BLD VENIPUNCTURE: CPT | Performed by: EMERGENCY MEDICINE

## 2022-01-01 PROCEDURE — 36415 COLL VENOUS BLD VENIPUNCTURE: CPT | Performed by: INTERNAL MEDICINE

## 2022-01-01 PROCEDURE — 99285 EMERGENCY DEPT VISIT HI MDM: CPT | Mod: 25

## 2022-01-01 RX ORDER — LIDOCAINE 50 MG/G
OINTMENT TOPICAL 4 TIMES DAILY
Status: DISCONTINUED | OUTPATIENT
Start: 2022-01-01 | End: 2022-01-01 | Stop reason: HOSPADM

## 2022-01-01 RX ORDER — LIDOCAINE 50 MG/G
OINTMENT TOPICAL 4 TIMES DAILY
Qty: 50 G | Refills: 1 | Status: SHIPPED | OUTPATIENT
Start: 2022-01-01 | End: 2023-01-01

## 2022-01-01 RX ORDER — LIDOCAINE 40 MG/G
CREAM TOPICAL
Status: DISCONTINUED | OUTPATIENT
Start: 2022-01-01 | End: 2022-01-01 | Stop reason: HOSPADM

## 2022-01-01 RX ORDER — OLANZAPINE 5 MG/1
5 TABLET, ORALLY DISINTEGRATING ORAL AT BEDTIME
Qty: 30 TABLET | Refills: 0 | Status: SHIPPED | OUTPATIENT
Start: 2022-01-01 | End: 2023-01-01

## 2022-01-01 RX ORDER — FUROSEMIDE 20 MG
20 TABLET ORAL DAILY
Status: DISCONTINUED | OUTPATIENT
Start: 2022-01-01 | End: 2022-01-01

## 2022-01-01 RX ORDER — CARBIDOPA AND LEVODOPA 25; 100 MG/1; MG/1
1 TABLET ORAL 3 TIMES DAILY
Status: DISCONTINUED | OUTPATIENT
Start: 2022-01-01 | End: 2022-01-01

## 2022-01-01 RX ORDER — OLANZAPINE 5 MG/1
5 TABLET, ORALLY DISINTEGRATING ORAL AT BEDTIME
Status: DISCONTINUED | OUTPATIENT
Start: 2022-01-01 | End: 2022-01-01 | Stop reason: HOSPADM

## 2022-01-01 RX ORDER — ONDANSETRON 2 MG/ML
4 INJECTION INTRAMUSCULAR; INTRAVENOUS EVERY 6 HOURS PRN
Status: DISCONTINUED | OUTPATIENT
Start: 2022-01-01 | End: 2022-01-01 | Stop reason: HOSPADM

## 2022-01-01 RX ORDER — AMOXICILLIN 250 MG
2 CAPSULE ORAL 2 TIMES DAILY PRN
Qty: 30 TABLET | Refills: 1 | Status: SHIPPED | OUTPATIENT
Start: 2022-01-01

## 2022-01-01 RX ORDER — METOPROLOL SUCCINATE 25 MG/1
25 TABLET, EXTENDED RELEASE ORAL EVERY EVENING
Status: DISCONTINUED | OUTPATIENT
Start: 2022-01-01 | End: 2022-01-01 | Stop reason: HOSPADM

## 2022-01-01 RX ORDER — AMOXICILLIN 250 MG
2 CAPSULE ORAL 2 TIMES DAILY PRN
Status: DISCONTINUED | OUTPATIENT
Start: 2022-01-01 | End: 2022-01-01

## 2022-01-01 RX ORDER — CARBIDOPA AND LEVODOPA 25; 100 MG/1; MG/1
1 TABLET ORAL 3 TIMES DAILY
Status: DISCONTINUED | OUTPATIENT
Start: 2022-01-01 | End: 2022-01-01 | Stop reason: HOSPADM

## 2022-01-01 RX ORDER — CARBIDOPA AND LEVODOPA 25; 100 MG/1; MG/1
TABLET ORAL
Qty: 270 TABLET | Refills: 3 | Status: SHIPPED | OUTPATIENT
Start: 2022-01-01

## 2022-01-01 RX ORDER — ASPIRIN 81 MG/1
81 TABLET, CHEWABLE ORAL EVERY EVENING
Status: DISCONTINUED | OUTPATIENT
Start: 2022-01-01 | End: 2022-01-01 | Stop reason: HOSPADM

## 2022-01-01 RX ORDER — FUROSEMIDE 20 MG
20 TABLET ORAL DAILY
Status: DISCONTINUED | OUTPATIENT
Start: 2022-01-01 | End: 2022-01-01 | Stop reason: HOSPADM

## 2022-01-01 RX ORDER — AMOXICILLIN 250 MG
1 CAPSULE ORAL 2 TIMES DAILY
Status: DISCONTINUED | OUTPATIENT
Start: 2022-01-01 | End: 2022-01-01 | Stop reason: HOSPADM

## 2022-01-01 RX ORDER — AMOXICILLIN 250 MG
1 CAPSULE ORAL 2 TIMES DAILY PRN
Status: DISCONTINUED | OUTPATIENT
Start: 2022-01-01 | End: 2022-01-01

## 2022-01-01 RX ORDER — ACETAMINOPHEN 325 MG/1
975 TABLET ORAL EVERY 8 HOURS
Qty: 63 TABLET | Refills: 0 | Status: SHIPPED | OUTPATIENT
Start: 2022-01-01 | End: 2022-01-01

## 2022-01-01 RX ORDER — HALOPERIDOL 1 MG/1
2 TABLET ORAL EVERY 4 HOURS PRN
Status: DISCONTINUED | OUTPATIENT
Start: 2022-01-01 | End: 2022-01-01

## 2022-01-01 RX ORDER — ATORVASTATIN CALCIUM 10 MG/1
10 TABLET, FILM COATED ORAL EVERY EVENING
Status: DISCONTINUED | OUTPATIENT
Start: 2022-01-01 | End: 2022-01-01 | Stop reason: HOSPADM

## 2022-01-01 RX ORDER — POLYETHYLENE GLYCOL 3350 17 G/17G
17 POWDER, FOR SOLUTION ORAL DAILY PRN
Status: DISCONTINUED | OUTPATIENT
Start: 2022-01-01 | End: 2022-01-01 | Stop reason: HOSPADM

## 2022-01-01 RX ORDER — ONDANSETRON 4 MG/1
4 TABLET, ORALLY DISINTEGRATING ORAL EVERY 6 HOURS PRN
Status: DISCONTINUED | OUTPATIENT
Start: 2022-01-01 | End: 2022-01-01 | Stop reason: HOSPADM

## 2022-01-01 RX ORDER — BISACODYL 10 MG
10 SUPPOSITORY, RECTAL RECTAL DAILY PRN
Status: DISCONTINUED | OUTPATIENT
Start: 2022-01-01 | End: 2022-01-01 | Stop reason: HOSPADM

## 2022-01-01 RX ORDER — ACETAMINOPHEN 325 MG/1
975 TABLET ORAL EVERY 8 HOURS
Status: DISCONTINUED | OUTPATIENT
Start: 2022-01-01 | End: 2022-01-01 | Stop reason: HOSPADM

## 2022-01-01 RX ADMIN — OLANZAPINE 5 MG: 5 TABLET, ORALLY DISINTEGRATING ORAL at 20:05

## 2022-01-01 RX ADMIN — CARBIDOPA AND LEVODOPA 1 TABLET: 25; 100 TABLET ORAL at 11:45

## 2022-01-01 RX ADMIN — AZATHIOPRINE 25 MG: 50 TABLET ORAL at 08:03

## 2022-01-01 RX ADMIN — CARBIDOPA AND LEVODOPA 1 TABLET: 25; 100 TABLET ORAL at 07:57

## 2022-01-01 RX ADMIN — ACETAMINOPHEN 975 MG: 325 TABLET, FILM COATED ORAL at 18:35

## 2022-01-01 RX ADMIN — METOPROLOL SUCCINATE 25 MG: 25 TABLET, EXTENDED RELEASE ORAL at 20:29

## 2022-01-01 RX ADMIN — CARBIDOPA AND LEVODOPA 1 TABLET: 25; 100 TABLET ORAL at 13:36

## 2022-01-01 RX ADMIN — ACETAMINOPHEN 975 MG: 325 TABLET, FILM COATED ORAL at 16:38

## 2022-01-01 RX ADMIN — LIDOCAINE: 50 OINTMENT TOPICAL at 07:58

## 2022-01-01 RX ADMIN — FUROSEMIDE 20 MG: 20 TABLET ORAL at 11:45

## 2022-01-01 RX ADMIN — FUROSEMIDE 20 MG: 20 TABLET ORAL at 07:57

## 2022-01-01 RX ADMIN — CARBIDOPA AND LEVODOPA 1 TABLET: 25; 100 TABLET ORAL at 19:32

## 2022-01-01 RX ADMIN — LIDOCAINE: 50 OINTMENT TOPICAL at 12:10

## 2022-01-01 RX ADMIN — AZATHIOPRINE 25 MG: 50 TABLET ORAL at 11:45

## 2022-01-01 RX ADMIN — ACETAMINOPHEN 975 MG: 325 TABLET, FILM COATED ORAL at 00:27

## 2022-01-01 RX ADMIN — LIDOCAINE: 50 OINTMENT TOPICAL at 16:39

## 2022-01-01 RX ADMIN — ACETAMINOPHEN 975 MG: 325 TABLET, FILM COATED ORAL at 08:56

## 2022-01-01 RX ADMIN — Medication 1 MG: at 00:25

## 2022-01-01 RX ADMIN — ATORVASTATIN CALCIUM 10 MG: 10 TABLET, FILM COATED ORAL at 20:05

## 2022-01-01 RX ADMIN — METOPROLOL SUCCINATE 25 MG: 25 TABLET, EXTENDED RELEASE ORAL at 20:05

## 2022-01-01 RX ADMIN — LIDOCAINE: 50 OINTMENT TOPICAL at 20:29

## 2022-01-01 RX ADMIN — CARBIDOPA AND LEVODOPA 1 TABLET: 25; 100 TABLET ORAL at 20:29

## 2022-01-01 RX ADMIN — CARBIDOPA AND LEVODOPA 1 TABLET: 25; 100 TABLET ORAL at 20:06

## 2022-01-01 RX ADMIN — ATORVASTATIN CALCIUM 10 MG: 10 TABLET, FILM COATED ORAL at 19:32

## 2022-01-01 RX ADMIN — LIDOCAINE: 50 OINTMENT TOPICAL at 18:35

## 2022-01-01 RX ADMIN — LIDOCAINE: 50 OINTMENT TOPICAL at 12:14

## 2022-01-01 RX ADMIN — SENNOSIDES AND DOCUSATE SODIUM 1 TABLET: 50; 8.6 TABLET ORAL at 13:36

## 2022-01-01 RX ADMIN — METOPROLOL SUCCINATE 25 MG: 25 TABLET, EXTENDED RELEASE ORAL at 19:32

## 2022-01-01 RX ADMIN — ACETAMINOPHEN 975 MG: 325 TABLET, FILM COATED ORAL at 23:32

## 2022-01-01 RX ADMIN — CARBIDOPA AND LEVODOPA 1 TABLET: 25; 100 TABLET ORAL at 14:24

## 2022-01-01 RX ADMIN — ASPIRIN 81 MG 81 MG: 81 TABLET ORAL at 20:29

## 2022-01-01 RX ADMIN — SENNOSIDES AND DOCUSATE SODIUM 1 TABLET: 50; 8.6 TABLET ORAL at 08:00

## 2022-01-01 RX ADMIN — AZATHIOPRINE 25 MG: 50 TABLET ORAL at 07:58

## 2022-01-01 RX ADMIN — HALOPERIDOL 2 MG: 1 TABLET ORAL at 00:25

## 2022-01-01 RX ADMIN — CARBIDOPA AND LEVODOPA 1 TABLET: 25; 100 TABLET ORAL at 13:52

## 2022-01-01 RX ADMIN — ATORVASTATIN CALCIUM 10 MG: 10 TABLET, FILM COATED ORAL at 20:29

## 2022-01-01 RX ADMIN — ASPIRIN 81 MG 81 MG: 81 TABLET ORAL at 20:06

## 2022-01-01 RX ADMIN — ACETAMINOPHEN 975 MG: 325 TABLET, FILM COATED ORAL at 11:45

## 2022-01-01 RX ADMIN — LIDOCAINE: 50 OINTMENT TOPICAL at 19:32

## 2022-01-01 RX ADMIN — LIDOCAINE: 50 OINTMENT TOPICAL at 20:09

## 2022-01-01 RX ADMIN — ACETAMINOPHEN 975 MG: 325 TABLET, FILM COATED ORAL at 07:57

## 2022-01-01 ASSESSMENT — ACTIVITIES OF DAILY LIVING (ADL)
ADLS_ACUITY_SCORE: 43
ADLS_ACUITY_SCORE: 35
ADLS_ACUITY_SCORE: 43
ADLS_ACUITY_SCORE: 35
ADLS_ACUITY_SCORE: 43
ADLS_ACUITY_SCORE: 43
ADLS_ACUITY_SCORE: 35
ADLS_ACUITY_SCORE: 43
ADLS_ACUITY_SCORE: 31
ADLS_ACUITY_SCORE: 43
ADLS_ACUITY_SCORE: 43
ADLS_ACUITY_SCORE: 35
ADLS_ACUITY_SCORE: 43
ADLS_ACUITY_SCORE: 29
ADLS_ACUITY_SCORE: 43
ADLS_ACUITY_SCORE: 31
ADLS_ACUITY_SCORE: 31
ADLS_ACUITY_SCORE: 43
ADLS_ACUITY_SCORE: 38
ADLS_ACUITY_SCORE: 43
ADLS_ACUITY_SCORE: 38
ADLS_ACUITY_SCORE: 43
DEPENDENT_IADLS:: CLEANING;COOKING;LAUNDRY;SHOPPING;MEAL PREPARATION;MEDICATION MANAGEMENT;MONEY MANAGEMENT;TRANSPORTATION
ADLS_ACUITY_SCORE: 36
ADLS_ACUITY_SCORE: 36
ADLS_ACUITY_SCORE: 38
ADLS_ACUITY_SCORE: 36
ADLS_ACUITY_SCORE: 35
ADLS_ACUITY_SCORE: 38

## 2022-01-01 ASSESSMENT — ENCOUNTER SYMPTOMS
FATIGUE: 0
BACK PAIN: 0
TREMORS: 1
HEADACHES: 0
COUGH: 0
CONFUSION: 1
VOMITING: 0
RHINORRHEA: 0
DIFFICULTY URINATING: 0
ARTHRALGIAS: 1
DIAPHORESIS: 0
WOUND: 0
FEVER: 0
CHILLS: 0
HEMATURIA: 0
FREQUENCY: 0
NAUSEA: 0

## 2022-01-12 ENCOUNTER — HOSPITAL ENCOUNTER (INPATIENT)
Facility: HOSPITAL | Age: 87
LOS: 1 days | Discharge: HOME | DRG: 056 | End: 2022-01-14
Attending: EMERGENCY MEDICINE | Admitting: STUDENT IN AN ORGANIZED HEALTH CARE EDUCATION/TRAINING PROGRAM
Payer: MEDICARE

## 2022-01-12 ENCOUNTER — APPOINTMENT (OUTPATIENT)
Dept: CT IMAGING | Facility: HOSPITAL | Age: 87
DRG: 056 | End: 2022-01-12
Attending: EMERGENCY MEDICINE
Payer: MEDICARE

## 2022-01-12 ENCOUNTER — TRANSFERRED RECORDS (OUTPATIENT)
Dept: HEALTH INFORMATION MANAGEMENT | Facility: CLINIC | Age: 87
End: 2022-01-12

## 2022-01-12 ENCOUNTER — APPOINTMENT (OUTPATIENT)
Dept: RADIOLOGY | Facility: HOSPITAL | Age: 87
DRG: 056 | End: 2022-01-12
Attending: EMERGENCY MEDICINE
Payer: MEDICARE

## 2022-01-12 DIAGNOSIS — I89.0 LYMPHEDEMA OF BOTH LOWER EXTREMITIES: ICD-10-CM

## 2022-01-12 DIAGNOSIS — U07.1 INFECTION DUE TO 2019 NOVEL CORONAVIRUS: ICD-10-CM
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
## 2022-01-12 DIAGNOSIS — G47.00 INSOMNIA, UNSPECIFIED TYPE: ICD-10-CM

## 2022-01-12 DIAGNOSIS — R41.0 DELIRIUM: ICD-10-CM

## 2022-01-12 DIAGNOSIS — G20.A1 PARKINSON'S DISEASE (H): Primary | ICD-10-CM

## 2022-01-12 LAB
ALBUMIN UR-MCNC: NEGATIVE MG/DL
ANION GAP SERPL CALCULATED.3IONS-SCNC: 9 MMOL/L (ref 5–18)
APPEARANCE UR: CLEAR
ATRIAL RATE - MUSE: 71 BPM
BILIRUB UR QL STRIP: NEGATIVE
BUN SERPL-MCNC: 25 MG/DL (ref 8–28)
CALCIUM SERPL-MCNC: 9.7 MG/DL (ref 8.5–10.5)
CHLORIDE BLD-SCNC: 105 MMOL/L (ref 98–107)
CO2 SERPL-SCNC: 28 MMOL/L (ref 22–31)
COLOR UR AUTO: ABNORMAL
CREAT SERPL-MCNC: 0.99 MG/DL (ref 0.7–1.3)
DIASTOLIC BLOOD PRESSURE - MUSE: NORMAL MMHG
ERYTHROCYTE [DISTWIDTH] IN BLOOD BY AUTOMATED COUNT: 12.5 % (ref 10–15)
FLUAV RNA SPEC QL NAA+PROBE: NEGATIVE
FLUBV RNA RESP QL NAA+PROBE: NEGATIVE
GFR SERPL CREATININE-BSD FRML MDRD: 75 ML/MIN/1.73M2
GLUCOSE BLD-MCNC: 103 MG/DL (ref 70–125)
GLUCOSE UR STRIP-MCNC: NEGATIVE MG/DL
HCT VFR BLD AUTO: 41 % (ref 40–53)
HGB BLD-MCNC: 13 G/DL (ref 13.3–17.7)
HGB UR QL STRIP: NEGATIVE
INTERPRETATION ECG - MUSE: NORMAL
KETONES UR STRIP-MCNC: NEGATIVE MG/DL
LEUKOCYTE ESTERASE UR QL STRIP: NEGATIVE
MCH RBC QN AUTO: 32.7 PG (ref 26.5–33)
MCHC RBC AUTO-ENTMCNC: 31.7 G/DL (ref 31.5–36.5)
MCV RBC AUTO: 103 FL (ref 78–100)
MUCOUS THREADS #/AREA URNS LPF: PRESENT /LPF
NITRATE UR QL: NEGATIVE
P AXIS - MUSE: 9 DEGREES
PH UR STRIP: 6 [PH] (ref 5–7)
PLATELET # BLD AUTO: 209 10E3/UL (ref 150–450)
POTASSIUM BLD-SCNC: 4.4 MMOL/L (ref 3.5–5)
PR INTERVAL - MUSE: 158 MS
QRS DURATION - MUSE: 104 MS
QT - MUSE: 404 MS
QTC - MUSE: 439 MS
R AXIS - MUSE: -55 DEGREES
RBC # BLD AUTO: 3.98 10E6/UL (ref 4.4–5.9)
RBC URINE: <1 /HPF
SARS-COV-2 RNA RESP QL NAA+PROBE: POSITIVE
SODIUM SERPL-SCNC: 142 MMOL/L (ref 136–145)
SP GR UR STRIP: 1.02 (ref 1–1.03)
SQUAMOUS EPITHELIAL: <1 /HPF
SYSTOLIC BLOOD PRESSURE - MUSE: NORMAL MMHG
T AXIS - MUSE: 21 DEGREES
TROPONIN I SERPL-MCNC: <0.01 NG/ML (ref 0–0.29)
UROBILINOGEN UR STRIP-MCNC: <2 MG/DL
VENTRICULAR RATE- MUSE: 71 BPM
WBC # BLD AUTO: 6.2 10E3/UL (ref 4–11)
WBC URINE: 2 /HPF

## 2022-01-12 PROCEDURE — 258N000003 HC RX IP 258 OP 636: Performed by: EMERGENCY MEDICINE

## 2022-01-12 PROCEDURE — 81001 URINALYSIS AUTO W/SCOPE: CPT | Performed by: EMERGENCY MEDICINE

## 2022-01-12 PROCEDURE — 85027 COMPLETE CBC AUTOMATED: CPT | Performed by: EMERGENCY MEDICINE

## 2022-01-12 PROCEDURE — 93005 ELECTROCARDIOGRAM TRACING: CPT | Performed by: EMERGENCY MEDICINE

## 2022-01-12 PROCEDURE — 72125 CT NECK SPINE W/O DYE: CPT

## 2022-01-12 PROCEDURE — 71046 X-RAY EXAM CHEST 2 VIEWS: CPT

## 2022-01-12 PROCEDURE — 70450 CT HEAD/BRAIN W/O DYE: CPT

## 2022-01-12 PROCEDURE — 84443 ASSAY THYROID STIM HORMONE: CPT | Performed by: STUDENT IN AN ORGANIZED HEALTH CARE EDUCATION/TRAINING PROGRAM

## 2022-01-12 PROCEDURE — 36415 COLL VENOUS BLD VENIPUNCTURE: CPT | Performed by: EMERGENCY MEDICINE

## 2022-01-12 PROCEDURE — 82310 ASSAY OF CALCIUM: CPT | Performed by: EMERGENCY MEDICINE

## 2022-01-12 PROCEDURE — 87636 SARSCOV2 & INF A&B AMP PRB: CPT | Performed by: EMERGENCY MEDICINE

## 2022-01-12 PROCEDURE — 99285 EMERGENCY DEPT VISIT HI MDM: CPT | Mod: 25

## 2022-01-12 PROCEDURE — 96361 HYDRATE IV INFUSION ADD-ON: CPT

## 2022-01-12 PROCEDURE — 82248 BILIRUBIN DIRECT: CPT | Performed by: INTERNAL MEDICINE

## 2022-01-12 PROCEDURE — 84484 ASSAY OF TROPONIN QUANT: CPT | Performed by: EMERGENCY MEDICINE

## 2022-01-12 RX ADMIN — SODIUM CHLORIDE 500 ML: 9 INJECTION, SOLUTION INTRAVENOUS at 18:34

## 2022-01-12 NOTE — ED PROVIDER NOTES
ED Provider In Triage Note  [facility]  Encounter Date: Jan 12, 2022    Chief Complaint   Patient presents with     Altered Mental Status     New Onset A-Fib at Clinic?? Repeat here     Fall       Brief HPI:   Juan Tee is a 86 year old male presenting to the Emergency Department with a chief complaint of confusion for the past few days.  Daughter says confusion today is improving.  He has also had chills.  He was seen at a clinic today and sent to the ER for further eval.    Brief Physical Exam:  /70   Pulse 61   Temp 98.1  F (36.7  C)   Resp 16   SpO2 98%   General: Non-toxic appearing  HEENT: Atraumatic  Resp: No respiratory distress  Abdomen: Non-peritoneal  Neuro: Alert, oriented, answers questions appropriately  Psych: Behavior appropriate      Plan Initiated in Triage:  Orders Placed This Encounter     XR Chest 2 Views     CBC with platelets     Basic metabolic panel     Troponin I     UA with Microscopic reflex to Culture     0.9% sodium chloride BOLUS       PIT Dispo:   Return to [facility] while awaiting workup and ED bed availability    Ady Toure MD on 1/12/2022 at 4:45 PM    Patient was evaluated by the Physician in Triage due to a limitation of available rooms in the Emergency Department. A plan of care was discussed based on the information obtained on the initial evaluation and patient was consuled to return back to the Emergency Department lobby after this initial evalutaiton until results were obtained or a room became available in the Emergency Department. Patient was counseled not to leave prior to receiving the results of their workup.      Ady Toure MD  01/12/22 [id number]

## 2022-01-12 NOTE — ED TRIAGE NOTES
Pt comes in with family. First noticed confusion in patient Sunday. Came from the clinic, new onset afib. Falls x2 in the past 2 days - denies hitting head or thinners.Confusion slightly improved today. Tremors when sleeping.

## 2022-01-13 ENCOUNTER — APPOINTMENT (OUTPATIENT)
Dept: ULTRASOUND IMAGING | Facility: HOSPITAL | Age: 87
DRG: 056 | End: 2022-01-13
Attending: STUDENT IN AN ORGANIZED HEALTH CARE EDUCATION/TRAINING PROGRAM
Payer: MEDICARE

## 2022-01-13 ENCOUNTER — APPOINTMENT (OUTPATIENT)
Dept: OCCUPATIONAL THERAPY | Facility: HOSPITAL | Age: 87
DRG: 056 | End: 2022-01-13
Attending: STUDENT IN AN ORGANIZED HEALTH CARE EDUCATION/TRAINING PROGRAM
Payer: MEDICARE

## 2022-01-13 ENCOUNTER — APPOINTMENT (OUTPATIENT)
Dept: CARDIOLOGY | Facility: HOSPITAL | Age: 87
DRG: 056 | End: 2022-01-13
Attending: STUDENT IN AN ORGANIZED HEALTH CARE EDUCATION/TRAINING PROGRAM
Payer: MEDICARE

## 2022-01-13 ENCOUNTER — APPOINTMENT (OUTPATIENT)
Dept: PHYSICAL THERAPY | Facility: HOSPITAL | Age: 87
DRG: 056 | End: 2022-01-13
Attending: STUDENT IN AN ORGANIZED HEALTH CARE EDUCATION/TRAINING PROGRAM
Payer: MEDICARE
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
## 2022-01-13 PROBLEM — U07.1 INFECTION DUE TO 2019 NOVEL CORONAVIRUS: Status: ACTIVE | Noted: 2022-01-13

## 2022-01-13 PROBLEM — R41.0 DELIRIUM: Status: ACTIVE | Noted: 2022-01-13

## 2022-01-13 LAB
ALBUMIN SERPL-MCNC: 3.5 G/DL (ref 3.5–5)
ALP SERPL-CCNC: 84 U/L (ref 45–120)
ALT SERPL W P-5'-P-CCNC: 10 U/L (ref 0–45)
AST SERPL W P-5'-P-CCNC: 18 U/L (ref 0–40)
ATRIAL RATE - MUSE: 65 BPM
BILIRUB DIRECT SERPL-MCNC: 0.2 MG/DL
BILIRUB SERPL-MCNC: 0.5 MG/DL (ref 0–1)
DIASTOLIC BLOOD PRESSURE - MUSE: 98 MMHG
INTERPRETATION ECG - MUSE: NORMAL
LVEF ECHO: NORMAL
P AXIS - MUSE: 87 DEGREES
PR INTERVAL - MUSE: 150 MS
PROT SERPL-MCNC: 6.8 G/DL (ref 6–8)
QRS DURATION - MUSE: 108 MS
QT - MUSE: 446 MS
QTC - MUSE: 495 MS
R AXIS - MUSE: -49 DEGREES
SYSTOLIC BLOOD PRESSURE - MUSE: 145 MMHG
T AXIS - MUSE: 12 DEGREES
TSH SERPL DL<=0.005 MIU/L-ACNC: 1.49 UIU/ML (ref 0.3–5)
VENTRICULAR RATE- MUSE: 74 BPM

## 2022-01-13 PROCEDURE — 250N000013 HC RX MED GY IP 250 OP 250 PS 637: Performed by: STUDENT IN AN ORGANIZED HEALTH CARE EDUCATION/TRAINING PROGRAM

## 2022-01-13 PROCEDURE — 99222 1ST HOSP IP/OBS MODERATE 55: CPT | Performed by: PSYCHIATRY & NEUROLOGY

## 2022-01-13 PROCEDURE — 93970 EXTREMITY STUDY: CPT

## 2022-01-13 PROCEDURE — 97166 OT EVAL MOD COMPLEX 45 MIN: CPT | Mod: GO

## 2022-01-13 PROCEDURE — 120N000001 HC R&B MED SURG/OB

## 2022-01-13 PROCEDURE — 97161 PT EVAL LOW COMPLEX 20 MIN: CPT | Mod: GP | Performed by: PHYSICAL THERAPIST

## 2022-01-13 PROCEDURE — 999N000157 HC STATISTIC RCP TIME EA 10 MIN

## 2022-01-13 PROCEDURE — 93306 TTE W/DOPPLER COMPLETE: CPT | Mod: 26 | Performed by: INTERNAL MEDICINE

## 2022-01-13 PROCEDURE — 999N000208 ECHOCARDIOGRAM COMPLETE

## 2022-01-13 PROCEDURE — 97110 THERAPEUTIC EXERCISES: CPT | Mod: GP | Performed by: PHYSICAL THERAPIST

## 2022-01-13 PROCEDURE — 250N000013 HC RX MED GY IP 250 OP 250 PS 637: Performed by: INTERNAL MEDICINE

## 2022-01-13 PROCEDURE — 96374 THER/PROPH/DIAG INJ IV PUSH: CPT | Mod: 59

## 2022-01-13 PROCEDURE — 250N000011 HC RX IP 250 OP 636: Performed by: INTERNAL MEDICINE

## 2022-01-13 PROCEDURE — 97535 SELF CARE MNGMENT TRAINING: CPT | Mod: GO

## 2022-01-13 PROCEDURE — 250N000011 HC RX IP 250 OP 636: Performed by: STUDENT IN AN ORGANIZED HEALTH CARE EDUCATION/TRAINING PROGRAM

## 2022-01-13 PROCEDURE — 99222 1ST HOSP IP/OBS MODERATE 55: CPT | Mod: AI | Performed by: STUDENT IN AN ORGANIZED HEALTH CARE EDUCATION/TRAINING PROGRAM

## 2022-01-13 PROCEDURE — 255N000002 HC RX 255 OP 636: Performed by: STUDENT IN AN ORGANIZED HEALTH CARE EDUCATION/TRAINING PROGRAM

## 2022-01-13 RX ORDER — ATORVASTATIN CALCIUM 10 MG/1
10 TABLET, FILM COATED ORAL EVERY EVENING
Status: DISCONTINUED | OUTPATIENT
Start: 2022-01-13 | End: 2022-01-15 | Stop reason: HOSPADM

## 2022-01-13 RX ORDER — ACETAMINOPHEN 650 MG/1
650 SUPPOSITORY RECTAL EVERY 6 HOURS PRN
Status: DISCONTINUED | OUTPATIENT
Start: 2022-01-13 | End: 2022-01-15 | Stop reason: HOSPADM

## 2022-01-13 RX ORDER — SPIRONOLACTONE 25 MG/1
25 TABLET ORAL DAILY
Status: DISCONTINUED | OUTPATIENT
Start: 2022-01-13 | End: 2022-01-15 | Stop reason: HOSPADM

## 2022-01-13 RX ORDER — BISACODYL 10 MG
10 SUPPOSITORY, RECTAL RECTAL DAILY PRN
Status: DISCONTINUED | OUTPATIENT
Start: 2022-01-13 | End: 2022-01-15 | Stop reason: HOSPADM

## 2022-01-13 RX ORDER — METOPROLOL SUCCINATE 25 MG/1
25 TABLET, EXTENDED RELEASE ORAL EVERY EVENING
Status: DISCONTINUED | OUTPATIENT
Start: 2022-01-13 | End: 2022-01-15 | Stop reason: HOSPADM

## 2022-01-13 RX ORDER — LIDOCAINE 40 MG/G
CREAM TOPICAL
Status: DISCONTINUED | OUTPATIENT
Start: 2022-01-13 | End: 2022-01-15 | Stop reason: HOSPADM

## 2022-01-13 RX ORDER — LANOLIN ALCOHOL/MO/W.PET/CERES
3 CREAM (GRAM) TOPICAL
Status: DISCONTINUED | OUTPATIENT
Start: 2022-01-13 | End: 2022-01-15 | Stop reason: HOSPADM

## 2022-01-13 RX ORDER — FUROSEMIDE 10 MG/ML
40 INJECTION INTRAMUSCULAR; INTRAVENOUS ONCE
Status: COMPLETED | OUTPATIENT
Start: 2022-01-13 | End: 2022-01-13

## 2022-01-13 RX ORDER — POLYETHYLENE GLYCOL 3350 17 G/17G
17 POWDER, FOR SOLUTION ORAL DAILY PRN
Status: DISCONTINUED | OUTPATIENT
Start: 2022-01-13 | End: 2022-01-15 | Stop reason: HOSPADM

## 2022-01-13 RX ORDER — ACETAMINOPHEN 325 MG/1
650 TABLET ORAL EVERY 6 HOURS PRN
Status: DISCONTINUED | OUTPATIENT
Start: 2022-01-13 | End: 2022-01-15 | Stop reason: HOSPADM

## 2022-01-13 RX ORDER — ATORVASTATIN CALCIUM 10 MG/1
10 TABLET, FILM COATED ORAL EVERY EVENING
Status: ON HOLD | COMMUNITY
Start: 2021-10-28 | End: 2023-01-01

## 2022-01-13 RX ORDER — FUROSEMIDE 10 MG/ML
20 INJECTION INTRAMUSCULAR; INTRAVENOUS ONCE
Status: DISCONTINUED | OUTPATIENT
Start: 2022-01-13 | End: 2022-01-13

## 2022-01-13 RX ORDER — ASPIRIN 81 MG/1
81 TABLET, CHEWABLE ORAL EVERY EVENING
Status: DISCONTINUED | OUTPATIENT
Start: 2022-01-13 | End: 2022-01-15 | Stop reason: HOSPADM

## 2022-01-13 RX ORDER — METOPROLOL SUCCINATE 25 MG/1
25 TABLET, EXTENDED RELEASE ORAL EVERY EVENING
COMMUNITY
Start: 2021-10-28 | End: 2023-01-01

## 2022-01-13 RX ORDER — HYDRALAZINE HYDROCHLORIDE 20 MG/ML
10 INJECTION INTRAMUSCULAR; INTRAVENOUS EVERY 6 HOURS PRN
Status: DISCONTINUED | OUTPATIENT
Start: 2022-01-13 | End: 2022-01-15 | Stop reason: HOSPADM

## 2022-01-13 RX ADMIN — ASPIRIN 81 MG CHEWABLE TABLET 81 MG: 81 TABLET CHEWABLE at 20:17

## 2022-01-13 RX ADMIN — ENOXAPARIN SODIUM 40 MG: 40 INJECTION SUBCUTANEOUS at 10:00

## 2022-01-13 RX ADMIN — METOPROLOL TARTRATE 12.5 MG: 25 TABLET, FILM COATED ORAL at 10:00

## 2022-01-13 RX ADMIN — ATORVASTATIN CALCIUM 10 MG: 10 TABLET, FILM COATED ORAL at 20:20

## 2022-01-13 RX ADMIN — FUROSEMIDE 40 MG: 10 INJECTION, SOLUTION INTRAMUSCULAR; INTRAVENOUS at 15:31

## 2022-01-13 RX ADMIN — SPIRONOLACTONE 25 MG: 25 TABLET, FILM COATED ORAL at 12:04

## 2022-01-13 RX ADMIN — PERFLUTREN 3 ML: 6.52 INJECTION, SUSPENSION INTRAVENOUS at 11:56

## 2022-01-13 ASSESSMENT — ACTIVITIES OF DAILY LIVING (ADL)
ADLS_ACUITY_SCORE: 16
ADLS_ACUITY_SCORE: 16
ADLS_ACUITY_SCORE: 13
ADLS_ACUITY_SCORE: 16
ADLS_ACUITY_SCORE: 13
ADLS_ACUITY_SCORE: 12
ADLS_ACUITY_SCORE: 12
ADLS_ACUITY_SCORE: 13
ADLS_ACUITY_SCORE: 16
ADLS_ACUITY_SCORE: 13
ADLS_ACUITY_SCORE: 16
ADLS_ACUITY_SCORE: 12
ADLS_ACUITY_SCORE: 13
ADLS_ACUITY_SCORE: 12
ADLS_ACUITY_SCORE: 13

## 2022-01-13 NOTE — PHARMACY-ADMISSION MEDICATION HISTORY
Pharmacy Note - Admission Medication History    Pertinent Provider Information:      ______________________________________________________________________    Prior To Admission (PTA) med list completed and updated in EMR.       PTA Med List   Medication Sig Last Dose     aspirin 81 mg chewable tablet Take 81 mg by mouth every evening  1/11/2022 at PM     atorvastatin (LIPITOR) 10 MG tablet Take 10 mg by mouth every evening 1/11/2022 at PM     azaTHIOprine (IMURAN) 50 mg tablet Take 25 mg by mouth daily  1/12/2022 at AM     CALCIUM ORAL [CALCIUM ORAL] Take 1 tablet by mouth 2 (two) times a day. 1/12/2022 at AM     cholecalciferol, vitamin D3, 5,000 unit capsule [CHOLECALCIFEROL, VITAMIN D3, 5,000 UNIT CAPSULE] Take 5,000 Units by mouth daily. 1/12/2022 at AM     DOCOSAHEXANOIC ACID/EPA (FISH OIL ORAL) [DOCOSAHEXANOIC ACID/EPA (FISH OIL ORAL)] Take 1,200 mg by mouth daily. 1/12/2022 at AM     FOLIC ACID/MULTIVIT-MIN/LUTEIN (CENTRUM SILVER ORAL) [FOLIC ACID/MULTIVIT-MIN/LUTEIN (CENTRUM SILVER ORAL)] Take 1 tablet by mouth daily. 1/12/2022 at AM     metoprolol succinate ER (TOPROL-XL) 25 MG 24 hr tablet Take 25 mg by mouth every evening 1/11/2022 at PM     spironolactone (ALDACTONE) 25 MG tablet Take 25 mg by mouth daily  1/12/2022 at AM     VIT C/E/ZN/COPPR/LUTEIN/ZEAXAN (PRESERVISION AREDS 2 ORAL) [VIT C/E/ZN/COPPR/LUTEIN/ZEAXAN (PRESERVISION AREDS 2 ORAL)] Take 1 capsule by mouth 2 (two) times a day. 1/12/2022 at AM       Information source(s): Family member and CareEverywhere/SureScripts  Method of interview communication: phone    Summary of Changes to PTA Med List  New:   Discontinued:   Changed:     Patient was asked about OTC/herbal products specifically.  PTA med list reflects this.    In the past week, patient estimated taking medication this percent of the time:  greater than 90%.    Allergies were reviewed, assessed, and updated with the patient.      Patient does not use any multi-dose medications prior to  admission.    The information provided in this note is only as accurate as the sources available at the time of the update(s).    Thank you for the opportunity to participate in the care of this patient.    Karely Stearns PharmD.  1/13/2022 8:41 AM

## 2022-01-13 NOTE — CONSULTS
[facility]  Neurology Consultation    Patient Name:  Juan Tee  MRN:  [id number]    :  1935  Date of Service:  2022  Primary care provider:  Keith Steward      Neurology consultation service was asked to see Juan Tee by Dr. Fernando to evaluate confusion and hallucinations.    Chief Complaint:  hallucinations    History of Present Illness:   Juan Tee is a 86 year old male with very minimal past medical history, most notable for hypertension and hyperlipidemia who neurology is consulted for confusion and hallucinations.  History obtained through patient and his niece with whom he lives.  He states at baseline patient does very well.  She has not noticed significant cognitive impairment.  He is still helpful around the home and does not require significant cares.  However, in the past couple of years she has noticed a worsening tremor.  She thinks it is bilateral.  She has also noticed that his gait is slowed down and he is more stooped and shuffling.  She states 3 to 4 days ago he was having poor sleep.  The family all had COVID at that time.  They state he was speaking and moving in the sleep, which is not unusual but was much more prominent than normal.  4 days ago, they state he was significantly confused and seem to be hallucinating.  She states they brought him to the doctor yesterday who was concerned that he was in A. fib and sent him to the emergency room.    Patient corroborates much of this history.  He states he is feeling better yesterday and today.  Daughter confirms that he was much improved yesterday.  He denies any hallucinations.  He does not report any anosmia, but daughter does feel like he moves it during his sleep and acts out his dreams.  He denies depression, but reports constipation.  He admits that his handwriting is gotten smaller, but does not feel that his speech is  "changed.    Otherwise currently he denies headache, vision changes, weakness, numbness, chest pain, shortness of breath, fevers, chills        ROS  A comprehensive ROS was performed and pertinent findings were included in HPI.     PMH  No past medical history on file.  Past Surgical History:   Procedure Laterality Date     [facility] OPEN RX PATELLA FX      Treatment Of Knee Fracture Patellar, Open     Tremors, Hypertension    Medications   I have personally reviewed the patient's medication list.     Allergies  I have personally reviewed the patient's allergy list.     Social History  Denies smoking, alcohol, drug use, lives with niece and nephew in [place]    Family History    Denies any family history of dementia or Parkinson's disease.      Physical Examination   Vitals: /62   Pulse 72   Temp 97.7  F (36.5  C) (Oral)   Resp 16   Ht 1.702 m (5' 7.01\")   Wt 67.1 kg (148 lb)   SpO2 96%   BMI 23.17 kg/m    General: Lying in bed, NAD, decreased blink rate  Head: Cut the vertex.  Normocephalic  Eyes: no icterus, op pink and moist  Cardiac: RRR  Respiratory: non-labored on RA, no audible wheezing  GI: Soft  Skin: Cut noted on arm right elbow, and on head as above  Psych: Mood pleasant, affect congruent  Neuro:  Mental status: Awake, alert, attentive, oriented to self, time, place, and circumstance. Language is fluent and coherent with intact comprehension of complex commands, naming and repetition.  2 out of 3 on delayed recall, able to follow three-step cross commands.  Does make 1 error on spelling world backward.  Cranial nerves:  PERRL, conjugate gaze, EOMI, facial sensation intact, face symmetric, shoulder shrug strong, tongue/uvula midline, no dysarthria.  Decreased blink rate  Motor: Tone increased, in bilateral upper extremities.  Rest tremor noted left greater than right.  Improved with movement.  Cogwheeling noted.  5/5 strength bilaterally in deltoids, biceps, triceps, hand , hip flexors, hip " extensors, knee flexion, knee extension, plantarflexion, dorsiflexion.  Decrease speed and amplitude on rapid finger and foot tapping.  Reflexes: Deep tendon reflexes present symmetric except for Achilles.  Babinski downgoing   sensory: Intact to light touch throughout upper and lower extremities, no sensory extinction  Coordination: FNF and HS without ataxia or dysmetria.   Gait: Normal base, short steps, shuffling, with decreased arm swing  Investigations   I have personally reviewed pertinent labs, tests, and radiological imaging. Discussion of notable findings is included under Impression.     Was patient transferred from outside hospital?   No    Impression  #Encephalopathy: Due to combination of Parkinsons disease and COVID  #Hallucinations: Due to insomnia and COVID  #REM sleep behavior disorder  #Probable Parkinsons disease  #COVID    Mr. Tee is a 86-year-old relatively healthy man who presents for encephalopathy and hallucinations.  His exam shows signs of parkinsonism.  When I discussed with daughter and patient it appears that he has had symptoms for at least the past 2 years, initially presenting with tremor and has now affected his gait and speed of movement.  Suspicion is that this represents idiopathic Parkinson's disease given the lack of noted cognitive impairment initially.  He is improved from what was reported previously at his bedside mental status exam, and he denies current hallucinations.  I do wonder if poor sleep and COVID may have precipitated this in someone with an underlying neurodegenerative disease.  I discussed with patient and niece that I would like to hold on doing a Sinemet trial in the setting of COVID/recent hallucinations.  Although he certainly will need close follow-up for this and I suspect may benefit from a Sinemet trial long-term.  Regarding his hallucinations, they are much improved.  Would prefer to avoid typical antipsychotics in the setting of Parkinson's  disease.  He may benefit from acetylcholinesterase inhibitor such as donepezil if they continue to be problematic.  However, given that they do not report problems with ADLs or cognitive impairment at baseline, I would not initiate at this time.  It does seem like he has REM sleep behavior disorder symptoms and may benefit from a trial of melatonin.    Recommendations  - Limit antipsychotic usage if at all possible (seroquel preferred agent if needed)  - If needed however would prefer to start with agent such as donepezil  -Melatonin 3 mg at night for REM sleep behavior disorder and sleep consolidation  -Needs follow-up in neurology for probable Sinemet trial  -Inpatient neurology will sign off if any other further concerns please do not hesitate to reach out    Thank you for involving Neurology in the care of Juan Tee.      Pinky Delvalle, DO

## 2022-01-13 NOTE — CONSULTS
Care Management Initial Consult    General Information  Assessment completed with: Family, niece: Luz Elena  Type of CM/SW Visit: CM Role Introduction    Primary Care Provider verified and updated as needed: No   Readmission within the last 30 days: no previous admission in last 30 days      Reason for Consult: discharge planning  Advance Care Planning:       General Information Comments: lives w/niece and nephew, they help with his cares.  Call Luz Elena for planning and decision making    Communication Assessment  Patient's communication style: spoken language (English or Bilingual)    Hearing Difficulty or Deaf: yes   Wear Glasses or Blind: yes    Cognitive  Cognitive/Neuro/Behavioral: WDL                      Living Environment:   People in home: other (see comments)     Current living Arrangements: house      Able to return to prior arrangements: yes       Family/Social Support:  Care provided by: other (see comments) (niece and nephew)  Provides care for: no one,no one, unable/limited ability to care for self  Marital Status: Single  Other (specify) (nephew and niece)          Description of Support System: Supportive,Involved    Support Assessment: Adequate social supports,Adequate family and caregiver support    Current Resources:   Patient receiving home care services: No     Community Resources: DME  Equipment currently used at home: walker, rolling  Supplies currently used at home: None    Employment/Financial:  Employment Status:          Financial Concerns: No concerns identified   Referral to Financial Counselor: No       Lifestyle & Psychosocial Needs:  Social Determinants of Health     Tobacco Use: Medium Risk     Smoking Tobacco Use: Former Smoker     Smokeless Tobacco Use: Never Used   Alcohol Use: Not on file   Financial Resource Strain: Not on file   Food Insecurity: Not on file   Transportation Needs: Not on file   Physical Activity: Not on file   Stress: Not on file   Social Connections: Not on file    Intimate Partner Violence: Not on file   Depression: Not on file   Housing Stability: Not on file       Functional Status:  Prior to admission patient needed assistance:   Dependent ADLs:: Independent,Ambulation-walker  Dependent IADLs:: Transportation,Shopping,Laundry,Cooking  Assesssment of Functional Status: Not at baseline with ADL Functioning,Not at baseline with mobility    Mental Health Status:  Mental Health Status: No Current Concerns       Chemical Dependency Status:                Values/Beliefs:  Spiritual, Cultural Beliefs, Uatsdin Practices, Values that affect care:                 Additional Information:  BENJI assessed, chart assessed, spoke to Luz Elena rae. Pt lives w/Luz Elena and her . They help w/needs and will provide transportation. Discharge planning should include Luz Elena. Requested copy of [facility].      Jasmeet Beauleiu RN

## 2022-01-13 NOTE — PROGRESS NOTES
01/13/22 0931   Quick Adds   Type of Visit Initial Occupational Therapy Evaluation   Living Environment   Living Environment Comments see PT note 1/13   Self-Care   Usual Activity Tolerance moderate   Current Activity Tolerance moderate   Equipment Currently Used at Home cane, straight;walker, rolling  (4WW, walk in shower)   Activity/Exercise/Self-Care Comment indep. drsg, bathing, A w/ household tasks, does not drive   Disability/Function   Difficulty Communicating no   Dressing/Bathing Difficulty no  (except socks)   Toileting issues no   General Information   Onset of Illness/Injury or Date of Surgery 01/12/22   Patient/Family Therapy Goal Statement (OT) none stated   Existing Precautions/Restrictions fall   Cognitive Status Examination   Orientation Status person;place;time   Cognitive Status Comments cont. to monitor   Visual Perception   Visual Impairment/Limitations corrective lenses full-time   Range of Motion Comprehensive   General Range of Motion   (limited AROM B shldrs)   Strength Comprehensive (MMT)   General Manual Muscle Testing (MMT) Assessment no strength deficits identified   Coordination   Upper Extremity Coordination No deficits were identified   Bed Mobility   Bed Mobility supine-sit;sit-supine   Supine-Sit [place] (Bed Mobility) contact guard   Sit-Supine [place] (Bed Mobility) moderate assist (50% patient effort)   Transfers   Transfers sit-stand transfer;bed-chair transfer   Transfer Skill: Bed to Chair/Chair to Bed   Bed-Chair [place] (Transfers) minimum assist (75% patient effort)   Assistive Device (Bed-Chair Transfers) rolling walker   Transfer Comments height of gurney high   Sit-Stand Transfer   Sit-Stand [place] (Transfers) minimum assist (75% patient effort)   Assistive Device (Sit-Stand Transfers) walker, front-wheeled   Balance   Balance Assessment standing balance: static   Standing Balance: Static WFL   Lower Body Dressing Assessment   [place] Level  (Lower Body Dressing) maximum assist (25% patient effort)   Comment (Lower Body Dressing) socks   Grooming Assessment   [place] Level (Grooming) contact guard assist   Comment (Grooming) washed hands   Clinical Impression   Criteria for Skilled Therapeutic Interventions Met (OT) yes   OT Diagnosis decreased ADLs   OT Problem List-Impairments impacting ADL mobility;activity tolerance impaired   Assessment of Occupational Performance 3-5 Performance Deficits   Identified Performance Deficits drsg, toileting, trsfs, g/h, cognition   Planned Therapy Interventions (OT) ADL retraining;transfer training;cognition   Clinical Decision Making Complexity (OT) moderate complexity   Therapy Frequency (OT) Daily   Predicted Duration of Therapy 5 days   Anticipated Equipment Needs Upon Discharge (OT)   (cont. to assess)   Risk & Benefits of therapy have been explained patient   OT Discharge Planning    OT Discharge Recommendation (DC Rec) Home with assist   OT Rationale for DC Rec rec. return home w/ cont. assist w/ household tasks   Total Evaluation Time (Minutes)   Total Evaluation Time (Minutes) 2

## 2022-01-13 NOTE — PROGRESS NOTES
I trained PT on use of the flutter valve and explained what it does. PT was able to give return demonstration on use, with good technique. PT able to perform independently.    Nigel Garrison, RT

## 2022-01-13 NOTE — H&P
[facility]    History and Physical - Hospitalist Service       Date of Admission:  1/12/2022    Assessment & Plan      Juan Tee is a 86 year old male admitted on 1/12/2022.     86-year-old male with past medical history of CAD, hyperlipidemia, polymyositis referred here from urgent care due to altered mental state      Altered mental status  -Confusion and hallucinations last 3 days  -CT head with generalized atrophy, negative for acute changes, chest x-ray with bibasilar pulmonary reticulations likely chronic.  Other Infectious work-ups negative except for COVID but no respiratory symptoms, no hypoxia.  Encephalopathy may be related to COVID.  -We will get neurology and psych eval. TSH and vitamin B12.    Upper extremity tremors  - Likely senile tremors, check TSH, neurology to see      Generalized weakness,  falls  -PT/OT      COVID-19 infection  - Not hypoxic, no respiratory symptoms, monitor closely.  COVID isolation precautions.        CAD-no angina, EKG normal sinus rhythm was frequent PACs.  Resume home meds after med rec    Hypertension-resume home meds after reconciliation, hydralazine as needed    Biltaeral LE swelling-  US BLE, Echo     Diet:    DVT Prophylaxis: Enoxaparin (Lovenox) SQ  Larkin Catheter: Not present  Central Lines: None  Code Status:   Full     Clinically Significant Risk Factors Present on Admission     # Platelet Defect: home medication list includes an antiplatelet medication       Disposition Plan   Expected Discharge: 2-3 days           Galdino Fernando MD  [facility]  Securely message with the Vocera Web Console (learn more here)  Text page via HumanAPI Paging/Directory        ______________________________________________________________________    Chief Complaint   confusion    History is obtained from the patient and ER signout    History of Present Illness   Juan Tee is  86-year-old male with past  medical history of CAD, hyperlipidemia, polymyositis referred here from urgent care due to altered mental state-confusions and hallucinations since 4 days ago.  Also noted to have generalized weakness and multiple falls.  Per report his confusion has slightly improved today but says he still feels generally exhausted and having chills.  He tested positive for COVID but no respiratory symptoms and chest x-ray with no obvious acute infiltrates.  Admitted for further management.      Review of Systems    The 10 point Review of Systems is negative other than noted in the HPI or here.     Past Medical History    I have reviewed this patient's medical history and updated it with pertinent information if needed.   No past medical history on file.    Past Surgical History   I have reviewed this patient's surgical history and updated it with pertinent information if needed.  Past Surgical History:   Procedure Laterality Date     HC OPEN RX PATELLA FX      Treatment Of Knee Fracture Patellar, Open       Social History   I have reviewed this patient's social history and updated it with pertinent information if needed.  Social History     Tobacco Use     Smoking status: Former Smoker     Packs/day: 1.00     Years: 8.00     Pack years: 8.00     Types: Cigarettes, Cigarettes     Quit date: 1962     Years since quittin.4     Smokeless tobacco: Never Used   Substance Use Topics     Alcohol use: Yes     Comment: Alcoholic Drinks/day: rare     Drug use: No       Family History   I have reviewed this patient's family history and updated it with pertinent information if needed.  Family History   Problem Relation Age of Onset     Kidney failure Mother 54.00     Cerebrovascular Disease Father 68.00     Sudden Death Father 72.00         in the doctor's office     Congenital heart disease Brother 0.00         at 26     Acute Myocardial Infarction Brother 68.00     Coronary Artery Disease Brother      CABG Brother 72.00      Rheumatic fever Sister      No Known Problems Sister      No Known Problems Sister      No Known Problems Sister      No Known Problems Sister        Prior to Admission Medications   Prior to Admission Medications   Prescriptions Last Dose Informant Patient Reported? Taking?   CALCIUM ORAL   Yes No   Sig: [CALCIUM ORAL] Take 1 tablet by mouth 2 (two) times a day.   DOCOSAHEXANOIC ACID/EPA (FISH OIL ORAL)   Yes No   Sig: [DOCOSAHEXANOIC ACID/EPA (FISH OIL ORAL)] Take 1,200 mg by mouth daily.   FOLIC ACID/MULTIVIT-MIN/LUTEIN (CENTRUM SILVER ORAL)   Yes No   Sig: [FOLIC ACID/MULTIVIT-MIN/LUTEIN (CENTRUM SILVER ORAL)] Take 1 tablet by mouth daily.   VIT C/E/ZN/COPPR/LUTEIN/ZEAXAN (PRESERVISION AREDS 2 ORAL)   Yes No   Sig: [VIT C/E/ZN/COPPR/LUTEIN/ZEAXAN (PRESERVISION AREDS 2 ORAL)] Take 1 capsule by mouth 2 (two) times a day.   aspirin 81 mg chewable tablet   Yes No   Sig: [ASPIRIN 81 MG CHEWABLE TABLET] Chew 81 mg daily.   atorvastatin (LIPITOR) 20 MG tablet   Yes No   Sig: [ATORVASTATIN (LIPITOR) 20 MG TABLET] Take 20 mg by mouth bedtime.   azaTHIOprine (IMURAN) 50 mg tablet   Yes No   Sig: [AZATHIOPRINE (IMURAN) 50 MG TABLET] Take 50 mg by mouth daily.   cholecalciferol, vitamin D3, 5,000 unit capsule   Yes No   Sig: [CHOLECALCIFEROL, VITAMIN D3, 5,000 UNIT CAPSULE] Take 5,000 Units by mouth daily.   metoprolol tartrate (LOPRESSOR) 25 MG tablet   No No   Sig: [METOPROLOL TARTRATE (LOPRESSOR) 25 MG TABLET] Take 0.5 tablets (12.5 mg total) by mouth 2 (two) times a day.   spironolactone (ALDACTONE) 25 MG tablet   Yes No   Sig: [SPIRONOLACTONE (ALDACTONE) 25 MG TABLET] Take 12.5 mg by mouth 2 (two) times a day at 9am and 6pm.       Facility-Administered Medications: None     Allergies   No Known Allergies    Physical Exam   Vital Signs: Temp: 98.5  F (36.9  C) Temp src: Oral BP: 124/58 Pulse: 90   Resp: 21 SpO2: 96 % O2 Device: None (Room air)    Weight: 148 lbs 0 oz    General Appearance: Alert and  cooperative  Eyes: Pink conjunctiva  HEENT: PERRLA  Respiratory: Bilateral good air entry  Cardiovascular: S1-S2, iregular rhythm no murmur  GI: Soft nontender  Genitourinary: No SP tenderness  Skin: No rashes  Musculoskeletal: Bilateral grade 3 pedal and pretibial edema  Neurologic: AO x2 no focal deficits, bilateral action tremors noted.    Data   Data reviewed today: I reviewed all medications, new labs and imaging results over the last 24 hours. I personally reviewed     Recent Labs   Lab 01/12/22  1831   WBC 6.2   HGB 13.0*   *         POTASSIUM 4.4   CHLORIDE 105   CO2 28   BUN 25   CR 0.99   ANIONGAP 9   KACIE 9.7        Recent Results (from the past 24 hour(s))   XR Chest 2 Views    Narrative    EXAM: XR CHEST 2 VW  LOCATION: [facility]  DATE/TIME: 1/12/2022 6:20 PM    INDICATION: dyspnea  COMPARISON: None.      Impression    IMPRESSION: Mild to moderate bibasilar pulmonary reticulation likely related to chronic fibrotic changes. Superimposed pneumonic infiltrates are difficult to exclude and short-term follow-up is recommended. No pleural effusion. Upper normal heart size.   No overt vascular congestion or pulmonary edema. Spinal degenerative changes.   Head CT w/o contrast    Narrative    EXAM: CT HEAD W/O CONTRAST, CT CERVICAL SPINE W/O CONTRAST  LOCATION: [facility]  DATE/TIME: 1/12/2022 11:28 PM    INDICATION: Cerebral hemorrhage suspected, confusion.  COMPARISON: None.  TECHNIQUE:   1) Routine CT Head without IV contrast. Multiplanar reformats. Dose reduction techniques were used.  2) Routine CT Cervical Spine without IV contrast. Multiplanar reformats. Dose reduction techniques were used.    FINDINGS:   HEAD CT:   INTRACRANIAL CONTENTS: No intracranial hemorrhage, extraaxial collection, or mass effect. No CT evidence of acute infarct. Mild volume loss and presumed chronic small vessel ischemia.    VISUALIZED  ORBITS/SINUSES/MASTOIDS: No intraorbital abnormality. No paranasal sinus mucosal disease. No middle ear or mastoid effusion.    BONES/SOFT TISSUES: No acute abnormality.    CERVICAL SPINE CT:   VERTEBRA: Alignment is normal. No acute fracture or posttraumatic subluxation. Mild multilevel degenerative disc disease. Moderate to marked multilevel facet arthropathy.     CANAL/FORAMINA: Moderate to marked canal narrowing at C3-C4. No other significant canal narrowing. Marked bilateral C6-C7 foraminal narrowing and lower grade changes at a few additional levels.    PARASPINAL: No extraspinal abnormality. Visualized lung fields are clear.      Impression    IMPRESSION:  HEAD CT:  1. No acute intracranial abnormality.    2. Age-related change.     CERVICAL SPINE CT:  1.  No CT evidence for acute fracture or post traumatic subluxation.     2.  Moderate to marked degenerative canal narrowing at C3-C4.    3.  Multilevel degenerative foraminal narrowing greatest at C6-C7 where it is marked bilaterally.   Cervical spine CT w/o contrast    Narrative    EXAM: CT HEAD W/O CONTRAST, CT CERVICAL SPINE W/O CONTRAST  LOCATION: [facility]  DATE/TIME: 1/12/2022 11:28 PM    INDICATION: Cerebral hemorrhage suspected, confusion.  COMPARISON: None.  TECHNIQUE:   1) Routine CT Head without IV contrast. Multiplanar reformats. Dose reduction techniques were used.  2) Routine CT Cervical Spine without IV contrast. Multiplanar reformats. Dose reduction techniques were used.    FINDINGS:   HEAD CT:   INTRACRANIAL CONTENTS: No intracranial hemorrhage, extraaxial collection, or mass effect. No CT evidence of acute infarct. Mild volume loss and presumed chronic small vessel ischemia.    VISUALIZED ORBITS/SINUSES/MASTOIDS: No intraorbital abnormality. No paranasal sinus mucosal disease. No middle ear or mastoid effusion.    BONES/SOFT TISSUES: No acute abnormality.    CERVICAL SPINE CT:   VERTEBRA: Alignment is normal. No  acute fracture or posttraumatic subluxation. Mild multilevel degenerative disc disease. Moderate to marked multilevel facet arthropathy.     CANAL/FORAMINA: Moderate to marked canal narrowing at C3-C4. No other significant canal narrowing. Marked bilateral C6-C7 foraminal narrowing and lower grade changes at a few additional levels.    PARASPINAL: No extraspinal abnormality. Visualized lung fields are clear.      Impression    IMPRESSION:  HEAD CT:  1. No acute intracranial abnormality.    2. Age-related change.     CERVICAL SPINE CT:  1.  No CT evidence for acute fracture or post traumatic subluxation.     2.  Moderate to marked degenerative canal narrowing at C3-C4.    3.  Multilevel degenerative foraminal narrowing greatest at C6-C7 where it is marked bilaterally.

## 2022-01-13 NOTE — ED NOTES
"[facility] ED Handoff Report    ED Chief Complaint: Altered mental status    ED Diagnosis:  (W19.XXXA) Fall, initial encounter  Comment:   Plan:     (R41.0) Delirium  Comment:   Plan:     (U07.1) Infection due to 2019 novel coronavirus  Comment:   Plan:        PMH:  No past medical history on file.     Code Status:  Full Code     Falls Risk: No Band: Not applicable    Current Living Situation/Residence: lives with a significant other     Elimination Status: Continent: Yes     Activity Level: SBA    Patients Preferred Language:  English     Needed: No    Vital Signs:  /64   Pulse 67   Temp 98.5  F (36.9  C) (Oral)   Resp 17   Ht 1.702 m (5' 7.01\")   Wt 67.1 kg (148 lb)   SpO2 96%   BMI 23.17 kg/m       Cardiac Rhythm: SR with PVC    Pain Score: 0/10    Is the Patient Confused:  Yes    Last Food or Drink: 1/12/22     Focused Assessment:  Neuro    Tests Performed: Done: Labs, imaging    Treatments Provided: Fluids      Family Dynamics/Concerns: No    Family Updated On Visitor Policy: Yes    Plan of Care Communicated to Family: Yes    Who Was Updated about Plan of Care: Patient and family.    Belongings Checklist Done and Signed by Patient: Yes    Covid: symptomatic, positive    Additional Information: NA    RN: Kiran Solomon   1/13/2022 7:11 AM         "

## 2022-01-13 NOTE — UTILIZATION REVIEW
Admission Status; Secondary Review Determination       Under the authority of the Utilization Management Committee, the utilization review process indicated a secondary review on the above patient. The review outcome is based on review of the medical records, discussions with staff, and applying clinical experience noted on the date of the review.     (x) Inpatient Status Appropriate - This patient's medical care is consistent with medical management for inpatient care and reasonable inpatient medical practice.     RATIONALE FOR DETERMINATION     Mr. Tee is a 85 yo male pt with a PMH of polymyositis who presents to the ED from urgent care with acute confusion and hallucinations.  Noted to be COVID +; CXR with bilateral infiltrates but unclear if acute or chronic. Admitted earlier this am.  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  Neuro would like to avoid initiating medications during acute COVID infection at this time.  Psych consult has been requested and is pending  If pt rapidly improves and is able to discharge home later today or in the am, then I would consider transitioning to OBS status.  At the time of admission with the information available to the attending physician more than 2 nights Hospital complex care was anticipated, based on patient risk of adverse outcome if treated as outpatient and complex care required. Inpatient admission is appropriate based on the Medicare guidelines.   The information on this document is developed by the utilization review team in order for the business office to ensure compliance. This only denotes the appropriateness of proper admission status and does not reflect the quality of care rendered.   The definitions of Inpatient Status and Observation Status used in making the determination above are those provided in the CMS Coverage Manual, Chapter 1  and Chapter 6, section 70.4.     Sincerely,     Jennifer Hi, DO  Utilization Review  Physician Advisor

## 2022-01-13 NOTE — PROGRESS NOTES
01/13/22 0937   Quick Adds   Type of Visit Initial PT Evaluation   Living Environment   People in home other (see comments)  (niece and nephew)   Current Living Arrangements house   Home Accessibility stairs within home   Living Environment Comments no ÁNGEL, stairs to basement where shower is   Self-Care   Usual Activity Tolerance moderate   Current Activity Tolerance fair   Equipment Currently Used at Home cane, straight;walker, rolling   Activity/Exercise/Self-Care Comment uses AD at times but not all the time, has lift recliner, sleeps in a bed and indep with bed mob   Disability/Function   Fall history within last six months yes   Number of times patient has fallen within last six months 2   General Information   Onset of Illness/Injury or Date of Surgery 01/12/22   Referring Physician Galdino Fernando MD   Pertinent History of Current Problem (include personal factors and/or comorbidities that impact the POC) COVID+, fall, AMS   Existing Precautions/Restrictions fall   Cognition   Orientation Status (Cognition) oriented x 4   Affect/Mental Status (Cognition) WFL   Follows Commands (Cognition) WFL   Pain Assessment   Patient Currently in Pain No   Integumentary/Edema   Integumentary/Edema Comments B LE edema, reports he always has swollen feet, does not wear compression, hypertrophic skin changes at feet and ankles, dryness, lower legs red color, shiny skin with decreased hair   Range of Motion (ROM)   ROM Comment decreased B hip IR but WFL   Strength   Strength Comments 5/5 B DF, 3+/5 B SLR   Bed Mobility   Comment (Bed Mobility) min A needed to lift legs into bed (high cart in the ED so he felt he was sliding off when trying to lift legs)   Transfers   Transfer Safety Comments CGA sit<>stand   Gait/Stairs (Locomotion)   Comment (Gait/Stairs) 10 ft with FWW CGA to SBA (assist for lines), then able to do 20 ft no AD SBA, walks with toe out gait, decreased heel strike and toe off, decreased step length    Balance   Balance Comments static stance with no LOB and no use of UEs   Sensory Examination   Sensory Perception Comments denies N/T in feet   Clinical Impression   Criteria for Skilled Therapeutic Intervention yes, treatment indicated   PT Diagnosis (PT) decreased act tolerance   Influenced by the following impairments decreased hip strength   Functional limitations due to impairments decreased ambulation, assist needed for bed mobility   Clinical Presentation Stable/Uncomplicated   Clinical Presentation Rationale clinical judgement   Clinical Decision Making (Complexity) low complexity   Therapy Frequency (PT) Daily   Predicted Duration of Therapy Intervention (days/wks) 3 days   Planned Therapy Interventions (PT) bed mobility training;gait training;strengthening;progressive activity/exercise   Anticipated Equipment Needs at Discharge (PT)   (has SEC and 4WW at home)   Risk & Benefits of therapy have been explained evaluation/treatment results reviewed;care plan/treatment goals reviewed;risks/benefits reviewed;current/potential barriers reviewed;participants voiced agreement with care plan;patient   PT Discharge Planning    PT Discharge Recommendation (DC Rec) home with assist   PT Rationale for DC Rec pt has assist at home, is moving SBA, no home PT needs anticipated unless here a long time and gets weaker   PT Brief overview of current status  10 ft with walker 20 ft without walker SBA, assist needed for bed mobility which is below his baseline   Total Evaluation Time   Total Evaluation Time (Minutes) 8

## 2022-01-13 NOTE — ED NOTES
Pts call light on and pt wanting to go to the bathroom. Pt told that due to positive covid pt would have to use urinal.  Pt just wanting to go to the bathroom.  Pt told that for his protection and others that he needed to stay in his room.  Tech compromised with offering to assist  pt with urinal standing at bedside.  Pt ok with that option. Pt assisted with standing due to cords pt steady when standing.  Pt assisted back into bed.

## 2022-01-13 NOTE — ED NOTES
Pt is settled into bed in room 5 in ED after being brought back from the lobby; he is able to stand with assist of one; he has BLE edema upon inspection after undressing; he is awake and alert but is not a good historian. He is oriented to self and place. He is meeting with Dr. Hasknis and is being connected to cardiac monitoring at this time. Urine has been obtained and sent to lab.

## 2022-01-13 NOTE — ED PROVIDER NOTES
EMERGENCY DEPARTMENT ENCOUNTER      NAME: Juan Tee  AGE: 86 year old male  YOB: 1935  MRN: [id number]  EVALUATION DATE & TIME: 1/12/2022 10:09 PM    PCP: Keith Steward    ED PROVIDER: Flower New M.D.      Chief Complaint   Patient presents with     Altered Mental Status     New Onset A-Fib at Clinic?? Repeat here     Fall         FINAL IMPRESSION:  1. Fall, initial encounter    2. Delirium    3. Infection due to 2019 novel coronavirus          ED COURSE & MEDICAL DECISION MAKING:    ED Course as of 01/13/22 0015   Wed Jan 12, 2022   2241 Pt with mutiple falls of unknown reason, no lightheadedness described and no loss of sensation or strength anywehre in body, stat CT head pending, awaiting UA and patient around COVID19 + contacts in home x2 which he lives with, high risk for COVID19 with adult failure to thrive at this time, COVID19 PCR underway and patient on contact precautions, and with new irregular HR unknown duration and no blood thinner use, will not convert as it could be moreso longstanding and afib vs. PACs yet undetermined, EKG underway. No RVR with HR  reassuringly and asymtpomatic afib except for fatigue and chills/hallucinations which are not typical for afib presentations. Pt amenable to plan to admit with delirium, UA negative CXR without obvious PNA and no sysmptoms consistent with PNA, repeat EKG undeway now, awaiting COVID19 PCR and CT head and c-spine with falls and plan to admit, he is ok with plan, will defer blood thinner to hsopitalist after CT only if afib present   2310 EKG and monitoring reveals frequent PVCs not afib, which is reassuring, thus no blood thinners indicated at this time   2351 Patient noted to be COVID19 +   Thu Jan 13, 2022   0005 Pt endorsed to hospitalist to med surg for admission       Pertinent Labs & Imaging studies reviewed. (See chart for details)    N95 worn  A face shield was worn also  COVID PPE      At the conclusion  "of the encounter I discussed the results of all of the tests and the disposition. The questions were answered. The patient or family acknowledged understanding and was agreeable with the care plan.     MEDICATIONS GIVEN IN THE EMERGENCY:  Medications   0.9% sodium chloride BOLUS (0 mLs Intravenous Stopped 1/12/22 2214)       NEW PRESCRIPTIONS STARTED AT TODAY'S ER VISIT  New Prescriptions    No medications on file          =================================================================    HPI    Juan Tee is a 86 year old male with PMHx of CAD, HLD, polymyositis type I who presents to the ED today via private vehicle referred from urgent care center with confusion.    Patient noted to triage MD and nurse that patient has c/o chills and appeared somewhat confused today but is now improving so she brought him to walk-in clinic and was referred to present to the ED instead so she drove him here.     Patient notes that he believes he saw his neice and nephew's daughter needing to put a ladder in a garage, but then notes that when he inquired re: whether the girl still needed help, they were baffled and the girl had not been present and so they brought him to urgent care and was directed here. He notes he has had three days of \"dreaming\" that \"people are there but they say nobody is there... dreams that people had a party but they never did have a party\", he has never had that issue before and hasn't ever confused sleep with awake before.    He notes this week x2 he fell. Once he was putting on clothes and felt awkward and fell over, denies striking head but admits to striking right arm without pain at that site. Second time \"my legs got tangled\" and fell onto buttock, 2 falls were both same day 2 days ago, he normally never falls.     He normally ambulates without assistance on his own and completes his own ADLs but uses a walker outdoors for safety, rare cane indoors as family wants him to use " "it.    No cough, no fever, no abdominal pain, no nausea/vomiting/diarrhea except for one loose stool accident into pants one week ago (which is abnormal), no chest pain or shortness of breath, no skin rash, no dysuria/hematuria/urgency/frequency. He is fully vaccinated against COVID19 but his family members who live in his home \"had COVID a week ago\".     No blood thinner use. No palpitations, no prior diagnosis of arrhythmia.      REVIEW OF SYSTEMS   All other systems reviewed and are negative except as noted above in HPI.    PAST MEDICAL HISTORY:  No past medical history on file.    PAST SURGICAL HISTORY:  Past Surgical History:   Procedure Laterality Date     HC OPEN RX PATELLA FX      Treatment Of Knee Fracture Patellar, Open       CURRENT MEDICATIONS:    aspirin 81 mg chewable tablet  atorvastatin (LIPITOR) 20 MG tablet  azaTHIOprine (IMURAN) 50 mg tablet  CALCIUM ORAL  cholecalciferol, vitamin D3, 5,000 unit capsule  DOCOSAHEXANOIC ACID/EPA (FISH OIL ORAL)  FOLIC ACID/MULTIVIT-MIN/LUTEIN (CENTRUM SILVER ORAL)  metoprolol tartrate (LOPRESSOR) 25 MG tablet  spironolactone (ALDACTONE) 25 MG tablet  VIT C/E/ZN/COPPR/LUTEIN/ZEAXAN (PRESERVISION AREDS 2 ORAL)        ALLERGIES:  No Known Allergies    FAMILY HISTORY:  Family History   Problem Relation Age of Onset     Kidney failure Mother 54.00     Cerebrovascular Disease Father 68.00     Sudden Death Father 72.00         in the doctor's office     Congenital heart disease Brother 0.00         at 26     Acute Myocardial Infarction Brother 68.00     Coronary Artery Disease Brother      CABG Brother 72.00     Rheumatic fever Sister      No Known Problems Sister      No Known Problems Sister      No Known Problems Sister      No Known Problems Sister        SOCIAL HISTORY:   Social History     Socioeconomic History     Marital status: Single     Spouse name: Not on file     Number of children: 0     Years of education: 12     Highest education level: Not on file "   Occupational History     Not on file   Tobacco Use     Smoking status: Former Smoker     Packs/day: 1.00     Years: 8.00     Pack years: 8.00     Types: Cigarettes, Cigarettes     Quit date: 1962     Years since quittin.4     Smokeless tobacco: Never Used   Substance and Sexual Activity     Alcohol use: Yes     Comment: Alcoholic Drinks/day: rare     Drug use: No     Sexual activity: Never   Other Topics Concern     Not on file   Social History Narrative    Lives in sister-in-law's house, who passed away in . Krista is his niece, who helps him for a few hours each day.     Social Determinants of Health     Financial Resource Strain: Not on file   Food Insecurity: Not on file   Transportation Needs: Not on file   Physical Activity: Not on file   Stress: Not on file   Social Connections: Not on file   Intimate Partner Violence: Not on file   Housing Stability: Not on file       VITALS:  Patient Vitals for the past 24 hrs:   BP Temp Pulse Resp SpO2 Weight   22 0000 120/72 -- 75 26 96 % --   22 2345 127/58 -- -- -- -- --   22 2300 -- -- 93 15 99 % --   22 2245 -- -- 70 13 100 % --   22 2244 -- -- -- -- -- 67.1 kg (148 lb)   22 2230 (!) 145/98 -- 74 13 100 % --   22 2226 (!) 141/69 -- -- -- -- --   22 1638 131/70 98.1  F (36.7  C) 61 16 98 % --       PHYSICAL EXAM    VITAL SIGNS: /72   Pulse 75   Temp 98.1  F (36.7  C)   Resp 26   Wt 67.1 kg (148 lb)   SpO2 96%   BMI 23.18 kg/m     GENERAL: Awake, alert.  In no acute distress. GCS 15  HEENT: Normocephalic, atraumatic.  Pupils equal, round and reactive.  Conjunctiva normal.  EOMI. No lopez sign, no racoon eyes, no mastoid tenderness, no hemotympanum, no facial instability, no nasal bridge pain, no nasal septal hematoma, no intraoral lacerations, no loose teeth, no mandible pain or deformity  NECK: No stridor or apparent deformity. No midline pain to palpation.  PULMONARY: Symmetrical breath  sounds without distress.  Lungs clear to auscultation bilaterally without wheezes, rhonchi or rales.  CARDIO: Irregularly irregular rate and rhythm.  No significant murmur, rub or gallop.  Radial pulses strong and symmetrical.  THORAX: No focal chest wall deformity or crepitus  BACK: No focal tenderness or deformity to each vertebral level in midline  ABDOMINAL: Abdomen soft, non-distended and non-tender to palpation.  No CVAT, BL.  EXTREMITIES: No lower extremity swelling or edema. Pelvis stable and without focal tenderness. Bilateral pedal pulses 2+ and equal.  NEURO: Alert and oriented to person, place and time.  Cranial nerves grossly intact.  No focal motor deficit. Sensation globally intact.  PSYCH: Normal mood and affect  SKIN: right elbow superficial abrasions x2, scalp x2 scabbed skin biopsy lesions          LAB:  All pertinent labs reviewed and interpreted.      RADIOLOGY:  Reviewed all pertinent imaging. Please see official radiology report.  Cervical spine CT w/o contrast   Preliminary Result   IMPRESSION:   HEAD CT:   1. No acute intracranial abnormality.      2. Age-related change.       CERVICAL SPINE CT:   1.  No CT evidence for acute fracture or post traumatic subluxation. Moderate to marked degenerative canal narrowing at C3-C4.      2.  Multilevel degenerative foraminal narrowing greatest at C6-C7 where it is marked bilaterally.      Head CT w/o contrast   Preliminary Result   IMPRESSION:   HEAD CT:   1. No acute intracranial abnormality.      2. Age-related change.       CERVICAL SPINE CT:   1.  No CT evidence for acute fracture or post traumatic subluxation. Moderate to marked degenerative canal narrowing at C3-C4.      2.  Multilevel degenerative foraminal narrowing greatest at C6-C7 where it is marked bilaterally.      XR Chest 2 Views   Final Result   IMPRESSION: Mild to moderate bibasilar pulmonary reticulation likely related to chronic fibrotic changes. Superimposed pneumonic infiltrates  are difficult to exclude and short-term follow-up is recommended. No pleural effusion. Upper normal heart size.    No overt vascular congestion or pulmonary edema. Spinal degenerative changes.            EKG:    Reviewed and interpreted as: 2259 sinus rhythm with occasional PVCs without ST anomalies, HR 74, QT slightly longer than usual with QTc 495      I have independently reviewed and interpreted the EKG(s) documented above.           Flower New MD  01/13/22 0015

## 2022-01-13 NOTE — PROGRESS NOTES
Daily Progress Note        CODE STATUS:  Full Code    01/13/22  Assessment/Plan:  86-year-old male with past medical history of CAD, systolic heart failure with EF 43% per echo on 7/2018, polymyositis who was brought to ED for evaluation of increased confusion, hallucination and admitted for further management    Confusion and hallucination;  REM sleep behavior disorder;  Probable Parkinson's disease per neurology;  General weakness/fall/physical deconditioning;  Per patient's family member, reported since 3 to 4 days prior to admission patient was having generalized weakness, waking up at nights and hallucinating/disoriented and also had couple of falls.  However, no reported febrile illness, no respiratory//GI symptoms/complaints.  No reported history of cognitive impairment.  --On admission, normal WBC count, UA negative.  CXR reported bibasilar pulmonary reticulation likely related to chronic fibrotic changes.  --On admission CT head reported central atrophy, negative for acute intracranial process.  --Appreciated neurology input, reported to limit antipsychotic medications.  Trial of melatonin and follow-up with them as an outpatient for probable Sinemet trial  -- Continue PT, OT, fall precaution.  Continue supportive care    COVID-19 test positive on 1/12/2022;  -- Admission a screening test for COVID-19 came back positive.  Patient's family member reported that they were positive for COVID-19 about 2 weeks ago.  However, patient never showed any signs and symptoms of COVID-19.  --Patient fully vaccinated and boosted  -- Currently on room air, no indication for COVID-19 focused treatment  -- Continue COVID-19 isolation precautions    History of CAD;  Chronic systolic heart failure;  Currently no anginal symptoms.  -- Reviewed previous cardiologist note, reported EF 43% on 2018  --On admission, echo reported EF 45 to 50%.  -- Continue home aspirin, Lipitor, Toprol-XL, Aldactone.    Chronic lower extremity  swelling/lymphedema;  --Per patient and family members chronic lower extremity swelling for some time now.  -- Lower extremity US negative for DVT  -- Trial of Lasix as ordered  -- PT/OT lymphedema consult    History of polymyositis  DVT prophylaxis; subcu Lovenox      Disposition; pending  Barrier to discharge; hallucination     LOS: 0 days     Subjective:  Interval History: Patient is new to me.  Patient seen and examined. Notes, labs, imaging reports personally reviewed.  Patient denied short of breath or chest pain, denied cough or congestion, denied abdomen pain, nausea, vomiting.  Denied fever or chills.  Currently denied hallucination.  Patient reported chronic lower extremity swelling.  Discussed with patient's family member in detail.  Discussed with nursing staffs    Review of Systems:   As mentioned in subjective.    Patient Active Problem List   Diagnosis     Dyslipidemia, goal LDL below 70     Coronary artery disease due to lipid rich plaque     Typical Polymyositis (Type I)     Delirium     Fall, initial encounter     Infection due to 2019 novel coronavirus       Scheduled Meds:    aspirin  81 mg Oral QPM     atorvastatin  10 mg Oral QPM     enoxaparin ANTICOAGULANT  40 mg Subcutaneous Q24H     metoprolol succinate ER  25 mg Oral QPM     sodium chloride (PF)  3 mL Intracatheter Q8H     spironolactone  25 mg Oral Daily     Continuous Infusions:  PRN Meds:.acetaminophen **OR** acetaminophen, bisacodyl, hydrALAZINE, lidocaine 4%, lidocaine (buffered or not buffered), melatonin, polyethylene glycol, sodium chloride (PF)    Objective:  Vital signs in last 24 hours:  Temp:  [97.7  F (36.5  C)-98.5  F (36.9  C)] 97.7  F (36.5  C)  Pulse:  [57-93] 72  Resp:  [13-29] 16  BP: (101-145)/(53-98) 113/62  SpO2:  [94 %-100 %] 96 %      Intake/Output Summary (Last 24 hours) at 1/13/2022 1435  Last data filed at 1/13/2022 0000  Gross per 24 hour   Intake 500 ml   Output 50 ml   Net 450 ml       Physical Exam:  General:  Not in obvious distress.  HEENT: Normocephalic, supple neck  Chest: Clear to auscultation bilateral anteriorly, no wheezing  Heart: S1S2 normal, regular  Abdomen: Soft. NT, ND. Bowel sounds- active.  Extremities: +2Bilateral lower extremity swelling and reported chronic  Neuro: alert and awake, able to tell full name, date of birth, hospital name, president name, month, year, moves all extremities      Lab Results:(I have personally reviewed the results)    Recent Results (from the past 24 hour(s))   ECG 12-LEAD WITH MUSE ([facility])    Collection Time: 01/12/22  4:50 PM   Result Value Ref Range    Systolic Blood Pressure  mmHg    Diastolic Blood Pressure  mmHg    Ventricular Rate 71 BPM    Atrial Rate 71 BPM    CA Interval 158 ms    QRS Duration 104 ms     ms    QTc 439 ms    P Axis 9 degrees    R AXIS -55 degrees    T Axis 21 degrees    Interpretation ECG        Poor data quality, interpretation may be adversely affected  Sinus rhythm  Left axis deviation  Abnormal ECG  When compared with ECG of 16-JAN-2020 16:38,  No significant change was found  Confirmed by SEE ED PROVIDER NOTE FOR, ECG INTERPRETATION (4000),  CYNTHIA STONE ([id number]) on 1/12/2022 10:01:48 PM  Also confirmed by SEE ED PROVIDER NOTE FOR, ECG INTERPRETATION ([id number]),  JESÚS DINERO ([id number])  on 1/12/2022 11:04:36 PM  Also confirmed by SEE ED PROVIDER NOTE FOR, ECG INTERPRETATION ([id number]),  Lashonda Trimble ([id number])  on 1/12/2022 11:07:18 PM     CBC with platelets    Collection Time: 01/12/22  6:31 PM   Result Value Ref Range    WBC Count 6.2 4.0 - 11.0 10e3/uL    RBC Count 3.98 (L) 4.40 - 5.90 10e6/uL    Hemoglobin 13.0 (L) 13.3 - 17.7 g/dL    Hematocrit 41.0 40.0 - 53.0 %     (H) 78 - 100 fL    MCH 32.7 26.5 - 33.0 pg    MCHC 31.7 31.5 - 36.5 g/dL    RDW 12.5 10.0 - 15.0 %    Platelet Count 209 150 - 450 10e3/uL   Basic metabolic panel    Collection Time: 01/12/22  6:31 PM   Result Value Ref Range    Sodium 142 136 - 145 mmol/L     Potassium 4.4 3.5 - 5.0 mmol/L    Chloride 105 98 - 107 mmol/L    Carbon Dioxide (CO2) 28 22 - 31 mmol/L    Anion Gap 9 5 - 18 mmol/L    Urea Nitrogen 25 8 - 28 mg/dL    Creatinine 0.99 0.70 - 1.30 mg/dL    Calcium 9.7 8.5 - 10.5 mg/dL    Glucose 103 70 - 125 mg/dL    GFR Estimate 75 >60 mL/min/1.73m2   Troponin I    Collection Time: 01/12/22  6:31 PM   Result Value Ref Range    Troponin I <0.01 0.00 - 0.29 ng/mL   TSH    Collection Time: 01/12/22  6:31 PM   Result Value Ref Range    TSH 1.49 0.30 - 5.00 uIU/mL   Hepatic panel    Collection Time: 01/12/22  6:31 PM   Result Value Ref Range    Bilirubin Total 0.5 0.0 - 1.0 mg/dL    Bilirubin Direct 0.2 <=0.5 mg/dL    Protein Total 6.8 6.0 - 8.0 g/dL    Albumin 3.5 3.5 - 5.0 g/dL    Alkaline Phosphatase 84 45 - 120 U/L    AST 18 0 - 40 U/L    ALT 10 0 - 45 U/L   UA with Microscopic reflex to Culture    Collection Time: 01/12/22 10:21 PM    Specimen: Urine, Midstream   Result Value Ref Range    Color Urine Light Yellow Colorless, Straw, Light Yellow, Yellow    Appearance Urine Clear Clear    Glucose Urine Negative Negative mg/dL    Bilirubin Urine Negative Negative    Ketones Urine Negative Negative mg/dL    Specific Gravity Urine 1.023 1.001 - 1.030    Blood Urine Negative Negative    pH Urine 6.0 5.0 - 7.0    Protein Albumin Urine Negative Negative mg/dL    Urobilinogen Urine <2.0 <2.0 mg/dL    Nitrite Urine Negative Negative    Leukocyte Esterase Urine Negative Negative    Mucus Urine Present (A) None Seen /LPF    RBC Urine <1 <=2 /HPF    WBC Urine 2 <=5 /HPF    Squamous Epithelials Urine <1 <=1 /HPF   Symptomatic; Unknown Influenza A/B & SARS-CoV2 (COVID-19) Virus PCR Multiplex Nasopharyngeal    Collection Time: 01/12/22 10:45 PM    Specimen: Nasopharyngeal; Swab   Result Value Ref Range    Influenza A PCR Negative Negative    Influenza B PCR Negative Negative    SARS CoV2 PCR Positive (A) Negative   ECG 12-LEAD WITH MUSE ([facility])    Collection Time: 01/12/22 10:59 PM    Result Value Ref Range    Systolic Blood Pressure 145 mmHg    Diastolic Blood Pressure 98 mmHg    Ventricular Rate 74 BPM    Atrial Rate 65 BPM    TX Interval 150 ms    QRS Duration 108 ms     ms    QTc 495 ms    P Axis 87 degrees    R AXIS -49 degrees    T Axis 12 degrees    Interpretation ECG       Sinus rhythm with Premature supraventricular complexes  Left axis deviation  Pulmonary disease pattern  Prolonged QT  Abnormal ECG  When compared with ECG of 12-JAN-2022 22:40,  QT has lengthened  Confirmed by SEE ED PROVIDER NOTE FOR, ECG INTERPRETATION ([id number]),  CYNTHIA STONE ([id number]) on 1/13/2022 5:46:32 AM     Echocardiogram Complete    Collection Time: 01/13/22 11:55 AM   Result Value Ref Range    LVEF  45-50% (mildly reduced)          Serum Glucose range:   Recent Labs   Lab 01/12/22  1831        ABG: No lab results found in last 7 days.  CBC:   Recent Labs   Lab 01/12/22  1831   WBC 6.2   HGB 13.0*   HCT 41.0   *        Chemistry:   Recent Labs   Lab 01/12/22  1831      POTASSIUM 4.4   CHLORIDE 105   CO2 28   BUN 25   CR 0.99   GFRESTIMATED 75   KACIE 9.7   PROTTOTAL 6.8   ALBUMIN 3.5   AST 18   ALT 10   ALKPHOS 84   BILITOTAL 0.5     Coags:  No results for input(s): INR, PROTIME, PTT in the last 168 hours.    Invalid input(s): APTT  Cardiac Markers:  Recent Labs   Lab 01/12/22  1831   TROPONINI <0.01        XR Chest 2 Views    Result Date: 1/12/2022  EXAM: XR CHEST 2 VW LOCATION: [facility] DATE/TIME: 1/12/2022 6:20 PM INDICATION: dyspnea COMPARISON: None.     IMPRESSION: Mild to moderate bibasilar pulmonary reticulation likely related to chronic fibrotic changes. Superimposed pneumonic infiltrates are difficult to exclude and short-term follow-up is recommended. No pleural effusion. Upper normal heart size. No overt vascular congestion or pulmonary edema. Spinal degenerative changes.    Echocardiogram Complete    Result Date: 1/13/2022  [id number]  [id number]^QIANA^[place] [address]  Name: KARLA ANGEL MRN: [id number] : 1935 Study Date: 2022 11:06 AM Age: 86 yrs Gender: Male Patient Location: [facility] Reason For Study: Edema Ordering Physician: FRANCI KIRAN Performed By: NOLAN  BSA: 1.8 m2 Height: 67 in Weight: 148 lb ______________________________________________________________________________ Procedure Complete Portable Echo Adult. Definity (NDC #36793-295) given intravenously. ______________________________________________________________________________ Interpretation Summary  The left ventricle is normal in size. Left ventricular function is decreased. The ejection fraction is 45-50% (mildly reduced). The right ventricle is mild to moderately dilated. Moderately decreased right ventricular systolic function There is mild to moderate (1-2+) tricuspid regurgitation. Right ventricular systolic pressure is elevated, consistent with mild pulmonary hypertension. ______________________________________________________________________________ Left Ventricle Left ventricular function is decreased. The ejection fraction is 45-50% (mildly reduced). The left ventricle is normal in size. There is normal left ventricular wall thickness. There is mild global hypokinesia of the left ventricle.  Right Ventricle The right ventricle is mild to moderately dilated. Moderately decreased right ventricular systolic function. TAPSE is normal, which is consistent with normal right ventricular systolic function.  Atria The left atrium is moderate to severely dilated. The right atrium is mildly dilated.  Mitral Valve Mitral valve leaflets appear normal. There is no evidence of mitral stenosis or clinically significant mitral regurgitation. There is trace mitral regurgitation.  Tricuspid Valve The tricuspid valve is not well visualized, but is grossly normal. There is mild to moderate (1-2+)  tricuspid regurgitation. Right ventricular systolic pressure is elevated, consistent with mild pulmonary hypertension.  Aortic Valve Aortic valve leaflets appear normal. There is no evidence of aortic stenosis or clinically significant aortic regurgitation. There is trace aortic regurgitation.  Pulmonic Valve The pulmonic valve is not well seen, but is grossly normal.  Vessels The aorta root is normal. Normal size ascending aorta. Inferior vena cava not well visualized for estimation of right atrial pressure.  Pericardium There is no pericardial effusion. ______________________________________________________________________________ MMode/2D Measurements & Calculations IVSd: 0.80 cm  LVIDd: 4.8 cm LVIDs: 3.4 cm LVPWd: 0.90 cm FS: 28.3 % LV mass(C)d: 136.6 grams LV mass(C)dI: 76.8 grams/m2 Ao root diam: 2.9 cm LA dimension: 2.6 cm LA/Ao: 0.88 LVOT diam: 2.1 cm LVOT area: 3.6 cm2 LA Volume Indexed (AL/bp): 37.4 ml/m2 RWT: 0.38  Time Measurements MM HR: 66.0 BPM  Doppler Measurements & Calculations MV E max leni: 50.4 cm/sec MV A max leni: 66.5 cm/sec MV E/A: 0.76 MV dec time: 0.23 sec AI P1/2t: 507.4 msec LV V1 max P.6 mmHg LV V1 max: 62.6 cm/sec LV V1 VTI: 14.2 cm SV(LVOT): 50.9 ml SI(LVOT): 28.6 ml/m2 PA acc time: 0.08 sec TR max leni: 301.6 cm/sec TR max P.4 mmHg E/E' av.5 Lateral E/e': 4.2 Medial E/e': 6.8  ______________________________________________________________________________ Report approved by: Arianne Saleh 2022 01:11 PM       US Lower Extremity Venous Duplex Bilateral    Result Date: 2022  EXAM: US LOWER EXTREMITY VENOUS DUPLEX BILATERAL LOCATION: [facility] DATE/TIME: 2022 12:18 PM INDICATION: Lower extremity swelling. COMPARISON: None. TECHNIQUE: Venous Duplex ultrasound of bilateral lower extremities with and without compression, augmentation and duplex. Color flow and spectral Doppler with waveform analysis performed. FINDINGS: Exam  includes the common femoral, femoral, popliteal veins as well as segmentally visualized deep calf veins and greater saphenous vein. RIGHT: No deep vein thrombosis. No superficial thrombophlebitis. No popliteal cyst. LEFT: No deep vein thrombosis. No superficial thrombophlebitis. No popliteal cyst.     IMPRESSION: 1.  No deep venous thrombosis in the bilateral lower extremities.    Cervical spine CT w/o contrast    Result Date: 1/13/2022  EXAM: CT HEAD W/O CONTRAST, CT CERVICAL SPINE W/O CONTRAST LOCATION: [facility] DATE/TIME: 1/12/2022 11:28 PM INDICATION: Cerebral hemorrhage suspected, confusion. COMPARISON: None. TECHNIQUE: 1) Routine CT Head without IV contrast. Multiplanar reformats. Dose reduction techniques were used. 2) Routine CT Cervical Spine without IV contrast. Multiplanar reformats. Dose reduction techniques were used. FINDINGS: HEAD CT: INTRACRANIAL CONTENTS: No intracranial hemorrhage, extraaxial collection, or mass effect. No CT evidence of acute infarct. Mild volume loss and presumed chronic small vessel ischemia. VISUALIZED ORBITS/SINUSES/MASTOIDS: No intraorbital abnormality. No paranasal sinus mucosal disease. No middle ear or mastoid effusion. BONES/SOFT TISSUES: No acute abnormality. CERVICAL SPINE CT: VERTEBRA: Alignment is normal. No acute fracture or posttraumatic subluxation. Mild multilevel degenerative disc disease. Moderate to marked multilevel facet arthropathy. CANAL/FORAMINA: Moderate to marked canal narrowing at C3-C4. No other significant canal narrowing. Marked bilateral C6-C7 foraminal narrowing and lower grade changes at a few additional levels. PARASPINAL: No extraspinal abnormality. Visualized lung fields are clear.     IMPRESSION: HEAD CT: 1. No acute intracranial abnormality. 2. Age-related change. CERVICAL SPINE CT: 1.  No CT evidence for acute fracture or post traumatic subluxation. 2.  Moderate to marked degenerative canal narrowing at C3-C4. 3.   Multilevel degenerative foraminal narrowing greatest at C6-C7 where it is marked bilaterally.    Head CT w/o contrast    Result Date: 1/13/2022  EXAM: CT HEAD W/O CONTRAST, CT CERVICAL SPINE W/O CONTRAST LOCATION: [facility] DATE/TIME: 1/12/2022 11:28 PM INDICATION: Cerebral hemorrhage suspected, confusion. COMPARISON: None. TECHNIQUE: 1) Routine CT Head without IV contrast. Multiplanar reformats. Dose reduction techniques were used. 2) Routine CT Cervical Spine without IV contrast. Multiplanar reformats. Dose reduction techniques were used. FINDINGS: HEAD CT: INTRACRANIAL CONTENTS: No intracranial hemorrhage, extraaxial collection, or mass effect. No CT evidence of acute infarct. Mild volume loss and presumed chronic small vessel ischemia. VISUALIZED ORBITS/SINUSES/MASTOIDS: No intraorbital abnormality. No paranasal sinus mucosal disease. No middle ear or mastoid effusion. BONES/SOFT TISSUES: No acute abnormality. CERVICAL SPINE CT: VERTEBRA: Alignment is normal. No acute fracture or posttraumatic subluxation. Mild multilevel degenerative disc disease. Moderate to marked multilevel facet arthropathy. CANAL/FORAMINA: Moderate to marked canal narrowing at C3-C4. No other significant canal narrowing. Marked bilateral C6-C7 foraminal narrowing and lower grade changes at a few additional levels. PARASPINAL: No extraspinal abnormality. Visualized lung fields are clear.     IMPRESSION: HEAD CT: 1. No acute intracranial abnormality. 2. Age-related change. CERVICAL SPINE CT: 1.  No CT evidence for acute fracture or post traumatic subluxation. 2.  Moderate to marked degenerative canal narrowing at C3-C4. 3.  Multilevel degenerative foraminal narrowing greatest at C6-C7 where it is marked bilaterally.          Latest radiology report personally reviewed.    Note created using dragon voice recognition software so sounds alike errors may have escaped editing.    01/13/2022   ASPEN VALDOVINOS,  MD  HOSPITALIST, [facility]  PAGER NO. [phone]

## 2022-01-14 ENCOUNTER — APPOINTMENT (OUTPATIENT)
Dept: PHYSICAL THERAPY | Facility: HOSPITAL | Age: 87
DRG: 056 | End: 2022-01-14
Payer: MEDICARE

## 2022-01-14 ENCOUNTER — APPOINTMENT (OUTPATIENT)
Dept: PHYSICAL THERAPY | Facility: HOSPITAL | Age: 87
DRG: 056 | End: 2022-01-14
Attending: INTERNAL MEDICINE
Payer: MEDICARE

## 2022-01-14 VITALS
BODY MASS INDEX: 23.23 KG/M2 | HEART RATE: 68 BPM | WEIGHT: 148 LBS | OXYGEN SATURATION: 98 % | TEMPERATURE: 98.5 F | RESPIRATION RATE: 16 BRPM | SYSTOLIC BLOOD PRESSURE: 122 MMHG | HEIGHT: 67 IN | DIASTOLIC BLOOD PRESSURE: 64 MMHG

## 2022-01-14 PROBLEM — R44.3 HALLUCINATIONS: Status: ACTIVE | Noted: 2022-01-14

## 2022-01-14 LAB
ANION GAP SERPL CALCULATED.3IONS-SCNC: 9 MMOL/L (ref 5–18)
BNP SERPL-MCNC: 147 PG/ML (ref 0–93)
BUN SERPL-MCNC: 25 MG/DL (ref 8–28)
CALCIUM SERPL-MCNC: 9.2 MG/DL (ref 8.5–10.5)
CHLORIDE BLD-SCNC: 105 MMOL/L (ref 98–107)
CO2 SERPL-SCNC: 30 MMOL/L (ref 22–31)
CREAT SERPL-MCNC: 0.89 MG/DL (ref 0.7–1.3)
GFR SERPL CREATININE-BSD FRML MDRD: 84 ML/MIN/1.73M2
GLUCOSE BLD-MCNC: 88 MG/DL (ref 70–125)
POTASSIUM BLD-SCNC: 3.8 MMOL/L (ref 3.5–5)
SODIUM SERPL-SCNC: 144 MMOL/L (ref 136–145)
VIT B12 SERPL-MCNC: 781 PG/ML (ref 213–816)

## 2022-01-14 PROCEDURE — 250N000011 HC RX IP 250 OP 636: Performed by: STUDENT IN AN ORGANIZED HEALTH CARE EDUCATION/TRAINING PROGRAM

## 2022-01-14 PROCEDURE — 83880 ASSAY OF NATRIURETIC PEPTIDE: CPT | Performed by: INTERNAL MEDICINE

## 2022-01-14 PROCEDURE — 80048 BASIC METABOLIC PNL TOTAL CA: CPT | Performed by: INTERNAL MEDICINE

## 2022-01-14 PROCEDURE — 97530 THERAPEUTIC ACTIVITIES: CPT | Mod: GP

## 2022-01-14 PROCEDURE — 36415 COLL VENOUS BLD VENIPUNCTURE: CPT | Performed by: INTERNAL MEDICINE

## 2022-01-14 PROCEDURE — 250N000011 HC RX IP 250 OP 636: Performed by: INTERNAL MEDICINE

## 2022-01-14 PROCEDURE — 96376 TX/PRO/DX INJ SAME DRUG ADON: CPT

## 2022-01-14 PROCEDURE — 82607 VITAMIN B-12: CPT | Performed by: STUDENT IN AN ORGANIZED HEALTH CARE EDUCATION/TRAINING PROGRAM

## 2022-01-14 PROCEDURE — 97110 THERAPEUTIC EXERCISES: CPT | Mod: GP

## 2022-01-14 PROCEDURE — 97116 GAIT TRAINING THERAPY: CPT | Mod: GP

## 2022-01-14 PROCEDURE — 99239 HOSP IP/OBS DSCHRG MGMT >30: CPT | Performed by: INTERNAL MEDICINE

## 2022-01-14 PROCEDURE — 97535 SELF CARE MNGMENT TRAINING: CPT | Mod: GP | Performed by: PHYSICAL THERAPIST

## 2022-01-14 PROCEDURE — 250N000013 HC RX MED GY IP 250 OP 250 PS 637: Performed by: INTERNAL MEDICINE

## 2022-01-14 PROCEDURE — 99222 1ST HOSP IP/OBS MODERATE 55: CPT | Performed by: NURSE PRACTITIONER

## 2022-01-14 RX ORDER — FUROSEMIDE 20 MG
20 TABLET ORAL DAILY
Qty: 30 TABLET | Refills: 0 | Status: SHIPPED | OUTPATIENT
Start: 2022-01-15 | End: 2023-01-01

## 2022-01-14 RX ORDER — POTASSIUM CHLORIDE 1500 MG/1
20 TABLET, EXTENDED RELEASE ORAL ONCE
Status: COMPLETED | OUTPATIENT
Start: 2022-01-14 | End: 2022-01-14

## 2022-01-14 RX ORDER — SPIRONOLACTONE 25 MG/1
50 TABLET ORAL DAILY
Refills: 0
Start: 2022-01-15 | End: 2022-01-01

## 2022-01-14 RX ORDER — FUROSEMIDE 10 MG/ML
20 INJECTION INTRAMUSCULAR; INTRAVENOUS
Status: DISCONTINUED | OUTPATIENT
Start: 2022-01-14 | End: 2022-01-14

## 2022-01-14 RX ADMIN — ATORVASTATIN CALCIUM 10 MG: 10 TABLET, FILM COATED ORAL at 19:58

## 2022-01-14 RX ADMIN — ENOXAPARIN SODIUM 40 MG: 40 INJECTION SUBCUTANEOUS at 10:01

## 2022-01-14 RX ADMIN — FUROSEMIDE 20 MG: 10 INJECTION, SOLUTION INTRAMUSCULAR; INTRAVENOUS at 09:57

## 2022-01-14 RX ADMIN — METOPROLOL SUCCINATE 25 MG: 25 TABLET, EXTENDED RELEASE ORAL at 19:34

## 2022-01-14 RX ADMIN — ASPIRIN 81 MG CHEWABLE TABLET 81 MG: 81 TABLET CHEWABLE at 19:33

## 2022-01-14 RX ADMIN — SPIRONOLACTONE 25 MG: 25 TABLET, FILM COATED ORAL at 09:54

## 2022-01-14 RX ADMIN — POTASSIUM CHLORIDE 20 MEQ: 1500 TABLET, EXTENDED RELEASE ORAL at 09:55

## 2022-01-14 ASSESSMENT — ACTIVITIES OF DAILY LIVING (ADL)
ADLS_ACUITY_SCORE: 13

## 2022-01-14 NOTE — ED NOTES
EDT has helped patient to commode and back to have a BM. Juan does well with the activity; BM is large, brown, soft, and formed.

## 2022-01-14 NOTE — PLAN OF CARE
Lymphedema Therapy Discharge Summary    Reason for therapy discharge:    Discharged to home with home therapy.    Progress towards therapy goal(s). See goals on Care Plan in [facility] electronic health record for goal details.  Goals partially met.  Barriers to achieving goals:   discharge on same date as initial evaluation.    Therapy recommendation(s):    Continued therapy is recommended.  Rationale/Recommendations:  Recommend home lymphedema follow-up.    Jennifer Doan, PT  1/14/2022

## 2022-01-14 NOTE — ED NOTES
Room tidied; wrappers removed from remaining food so pt can access them to eat easier. Coffee placed within reach, garbage emptied, floor cleared of wrappers and garbage, counters cleared of garbage and used items.

## 2022-01-14 NOTE — DISCHARGE INSTRUCTIONS
Lymphedema Therapy Instructions for Discharge:    Please remove wraps if soiled, or wraps become too tight or patient becomes more short of breath. Remove wraps on Saturday, 1/15/2022, and await further instruction from home lymphedema therapy.

## 2022-01-14 NOTE — ED NOTES
Patient sleeping in bed peacefully. Denies any pain at this time. Denies the need to use the toilet. Denies any pain.

## 2022-01-14 NOTE — CONSULTS
INITIAL PSYCHIATRIC CONSULTATION  This note was created with the help of Dragon dictation system. Grammatical and typing errors are not intentional.                  REASON FOR REQUEST: hallucinations      ASSESSMENT/RECOMMENDATIONS/PLAN :     Episodic visual hallucinations: None last 24 hours.  Mild intermittent metabolic encephalopathy likely in the context of COVID-19, hospitalization, advanced age, acute care environment.  Cognitive and behavioral changes due to above.  Sleep difficulties exacerbated in the hospital environment.  At risk for delirium    Recommendations:  Patient is doing well, exhibiting no acute psychosis, no hallucinations thus would not recommend scheduling any antipsychotics at this time.  Efforts at sleep regulation.  Returning patient to his familiar environment would be the most optimal step at this time.  Prolonged hospital stay places him at risk for delirium thus associated cognitive changes.  For sleep regulation consider trial of melatonin if he stays in hospital.  Should he exhibit acute psychosis, hallucinations and paranoia consider olanzapine 2.5 mg 3 times a day as needed.  Use psychotropics in jdicious manner.          MENTAL STATUS EXAMINATION:   General Appearance: NAD, Comfortable, resting in bed in ER.  Aware of hospital environment.  Speech: Slow.  Clear  Thought Process: Increased latency, linear  Thought content: No psychosis, NO SI, no acute visual or auditory hallucinations  Thought Formation: No loosening of associations.  Judgment: Fair  Insight : Adequate  Attention : Adequate  Memory: Depressed for events from the first 24 hours of coming to the ER, recalls events from this morning.  Fund Of Knowledge: Adequate  Affect: Neutral  Mood: Congruent  Alert : Arousable  Orientation: X 2-3  Comprehension: Depressed  Generative thought content:Slow  Language: Intact  Gait and Ambulation:Slow.  Need assist for ambulation.  Gait unstable without assistance.  Patient is  "independent with ambulation at home.  Independent with ADLs  Musculoskeletal: No tonal abnormalities  /63   Pulse 69   Temp 98.7  F (37.1  C) (Oral)   Resp 18   Ht 1.702 m (5' 7.01\")   Wt 67.1 kg (148 lb)   SpO2 94%   BMI 23.17 kg/m        HISTORY OF PRESENT ILLNESS:   Presenting history to include: Per [facility]/Specialists:   Juan Tee is  86-year-old male with past medical history of CAD, hyperlipidemia, polymyositis referred here from urgent care due to altered mental state-confusions and hallucinations since 4 days ago.  Also noted to have generalized weakness and multiple falls.  Per report his confusion has slightly improved today but says he still feels generally exhausted and having chills.  He tested positive for COVID but no respiratory symptoms and chest x-ray with no obvious acute infiltrates.  Admitted for further management.    Upon assessment, patient was noted to be pleasant and cooperative.  He engaged in a coherent and reliable conversation.  He did not report of any active visual or auditory hallucinations, did not report of any paranoia or delusions.  He remembered having vivid dreams which was not frightening to him however denied ever having visual hallucinations.  Recalling events precipitating his hospitalization as \"my nieces and nephews thought that I was talking nonsense so they brought me here\".  He believed he had some confusion going on for 3 days prior to coming to hospital and \"not remembering\" when he came to hospital.  He knew that he was in the ER and that he had been there for more than 24 hours.  He was a little confused between [facility] and [facility].  Once he was informed that he was at [facility] he immediately recall the city as [place].  Throughout this assessment he was pleasant, cooperative and responding to questions appropriately.  He resides at home with his family who assist him with cares.  He does not operate " "a motor vehicle.  He denies any history of premorbid psychiatric illness.  He denies ever taking any psychotropics, antidepressant or anxiolytic.  He does not report of any nightmares or hallucinations whether visual or auditory.  He denies any auditory hallucinations.  Knows how to operate the call light and his room TV.  Denies any changes in his appetite or sleep pattern.  Review of Systems:As per HPI. Remainders of 12 point review of systems negative.  Psychiatric ROS:  Denies any concerns for psychiatric review of systems.        PFSH reviewed  and not pertinent to chief complaint/reason for visit  /70   Pulse 59   Temp 98.7  F (37.1  C) (Oral)   Resp 18   Ht 1.702 m (5' 7.01\")   Wt 67.1 kg (148 lb)   SpO2 93%   BMI 23.17 kg/m    No results found for: AMPHET, PCP, BARBIT, OXYCODONE, THC, ETOH  @24HOURRESULTS@      PMH: No past medical history on file.        Current Medications:Scheduled Meds:    aspirin  81 mg Oral QPM     atorvastatin  10 mg Oral QPM     enoxaparin ANTICOAGULANT  40 mg Subcutaneous Q24H     metoprolol succinate ER  25 mg Oral QPM     sodium chloride (PF)  3 mL Intracatheter Q8H     spironolactone  25 mg Oral Daily     Continuous Infusions:  PRN Meds:.acetaminophen **OR** acetaminophen, bisacodyl, hydrALAZINE, lidocaine 4%, lidocaine (buffered or not buffered), melatonin, polyethylene glycol, sodium chloride (PF)                Family History: PERSONALLY REVIEWED.  Family History   Problem Relation Age of Onset     Kidney failure Mother 54.00     Cerebrovascular Disease Father 68.00     Sudden Death Father 72.00         in the doctor's office     Congenital heart disease Brother 0.00         at 26     Acute Myocardial Infarction Brother 68.00     Coronary Artery Disease Brother      CABG Brother 72.00     Rheumatic fever Sister      No Known Problems Sister      No Known Problems Sister      No Known Problems Sister      No Known Problems Sister      Pertinent Family hx " not pertinent to Chief Complaint or reason for visit.     Social History:  PERSONALLY REVIEWED.  Social History     Socioeconomic History     Marital status: Single     Spouse name: Not on file     Number of children: 0     Years of education: 12     Highest education level: Not on file   Occupational History     Not on file   Tobacco Use     Smoking status: Former Smoker     Packs/day: 1.00     Years: 8.00     Pack years: 8.00     Types: Cigarettes, Cigarettes     Quit date: 1962     Years since quittin.4     Smokeless tobacco: Never Used   Substance and Sexual Activity     Alcohol use: Yes     Comment: Alcoholic Drinks/day: rare     Drug use: No     Sexual activity: Never   Other Topics Concern     Not on file   Social History Narrative    Lives in sister-in-law's house, who passed away in . Krista is his niece, who helps him for a few hours each day.     Social Determinants of Health     Financial Resource Strain: Not on file   Food Insecurity: Not on file   Transportation Needs: Not on file   Physical Activity: Not on file   Stress: Not on file   Social Connections: Not on file   Intimate Partner Violence: Not on file   Housing Stability: Not on file    not pertinent to Chief Complaint or reason for visit.             Allergies as of 2014 Reviewed     Review of Systems:As per HPI. Remainders of 12 point review of systems negative.    Review of Pertinent Laboratory:      PERSONALLY REVIEWED.    Physical Exam: Temp:  [97.7  F (36.5  C)-98.7  F (37.1  C)] 98.7  F (37.1  C)  Pulse:  [54-93] 59  Resp:  [15-27] 18  BP: ()/(53-78) 101/70  SpO2:  [93 %-99 %] 93 %   Vitals: reviewed in chart     Physical exam as per medical team: reviewed in chart      diagnoses, risk and benefits of medications discussed with staff. Care coordination with care management team.   Thank you for this consultation.       Berta Ca; NP  Mental [facility] Addiction Services        This note was created with the help  of Dragon dictation system. Grammatical and typing errors are not intentional.

## 2022-01-14 NOTE — DISCHARGE SUMMARY
[facility] MEDICINE  DISCHARGE SUMMARY     Primary Care Physician: Keith Steward  Admission Date: 1/12/2022   Discharge Provider: Lorne VELÁZQUEZ MD Discharge Date: 1/14/2022   Diet:   Active Diet and Nourishment Order   Procedures     Combination Diet Low Saturated Fat Na <2400mg Diet, No Caffeine Diet     Diet       Code Status: Full Code   Activity: DCACTIVITY: Activity as tolerated        Condition at Discharge: Good     REASON FOR PRESENTATION(See Admission Note for Details)   Confusion, hallucination.  Please refer to H&P for details    PRINCIPAL & ACTIVE DISCHARGE DIAGNOSES     Principal Problem:    Hallucinations  Active Problems:    Dyslipidemia, goal LDL below 70    Coronary artery disease due to lipid rich plaque    Delirium    Fall, initial encounter    Infection due to 2019 novel coronavirus      PENDING LABS     Unresulted Labs Ordered in the Past 30 Days of this Admission     Date and Time Order Name Status Description    1/14/2022 12:03 AM Vitamin B12 In process             PROCEDURES ( this hospitalization only)          RECOMMENDATIONS TO OUTPATIENT PROVIDER FOR F/U VISIT     Follow-up Appointments     Follow-up and recommended labs and tests       Follow up with primary care provider, Keith Steward, within 7 days for   hospital follow- up and medication refills.  The following labs/tests are   recommended: BMP.             Follow-up with neurologist as recommended  DISPOSITION     Home with home care    SUMMARY OF HOSPITAL COURSE:      86-year-old male with past medical history of CAD, systolic heart failure with EF 43% per echo on 7/2018, polymyositis who was brought to ED for evaluation of increased confusion, hallucination and admitted for further management     Confusion and hallucination; improved  REM sleep behavior disorder;  Probable Parkinson's disease per neurology;  Fall;  Per patient's family member, reported since 3 to 4 days prior to admission  patient was having generalized weakness, waking up at nights and hallucinating/disoriented and also had couple of falls.  However, no reported febrile illness, no respiratory//GI symptoms/complaints.  No reported history of cognitive impairment.  --On admission, normal WBC count, UA negative.  CXR reported bibasilar pulmonary reticulation likely related to chronic fibrotic changes.  --On admission CT head reported central atrophy, negative for acute intracranial process.  --Appreciated neurology input, reported to limit antipsychotic medications.  Trial of melatonin and follow-up with them as an outpatient for probable Sinemet trial.  --Appreciated psychiatric input, recommended no new intervention/medications.  --Improving, continue Continue PT, OT at home  --Patient encouraged to use walker when ambulating.     COVID-19 test positive on 1/12/2022;  --Admission a screening test for COVID-19 came back positive.  Patient's family member reported that they were positive for COVID-19 about 2 weeks ago.  However, patient never showed any signs and symptoms of COVID-19.  He still does not report any signs and symptoms of COVID-19.  --Patient fully vaccinated and boosted  --Currently on room air, no indication for COVID-19 focused treatment  --Continue isolation for 10 days from 1/12/2022     History of CAD;  Chronic systolic heart failure;  Currently no anginal symptoms.  --Reviewed previous cardiologist note, reported EF 43% on 2018  --On admission, echo reported EF 45 to 50%.  --Continue home aspirin, Lipitor, Toprol-XL, Aldactone.     Chronic lower extremity swelling/lymphedema;  --Per patient and family members chronic lower extremity swelling for some time now.  --Lower extremity US negative for DVT  -- Patient responding to IV Lasix, transition to oral on discharge, also increased home Aldactone from 25 to 50 mg daily to upset hypokalemia from lasix  -- Appreciated lymphedema team     DVT prophylaxis; subcu  Lovenox during hospitalization.  Advised patient to move around every couple of hours given positive COVID test which increased risk of blood clot.  Hesitant to add any anticoagulation on discharge given patient does not have any signs and symptoms of COVID 19, now seems at his baseline and had multiple falls prior to bringing to hospital which puts risk of bleeding.  This was discussed with patient's family member and they understands       Discharge Medications with Med changes:     Current Discharge Medication List      START taking these medications    Details   furosemide (LASIX) 20 MG tablet Take 1 tablet (20 mg) by mouth daily  Qty: 30 tablet, Refills: 0    Associated Diagnoses: Lymphedema of both lower extremities      melatonin 1 MG TABS tablet Take 1 tablet (1 mg) by mouth nightly as needed for sleep    Associated Diagnoses: Insomnia, unspecified type         CONTINUE these medications which have CHANGED    Details   spironolactone (ALDACTONE) 25 MG tablet Take 2 tablets (50 mg) by mouth daily  Refills: 0    Associated Diagnoses: Lymphedema of both lower extremities         CONTINUE these medications which have NOT CHANGED    Details   aspirin 81 mg chewable tablet Take 81 mg by mouth every evening       atorvastatin (LIPITOR) 10 MG tablet Take 10 mg by mouth every evening      azaTHIOprine (IMURAN) 50 mg tablet Take 25 mg by mouth daily       CALCIUM ORAL [CALCIUM ORAL] Take 1 tablet by mouth 2 (two) times a day.      cholecalciferol, vitamin D3, 5,000 unit capsule [CHOLECALCIFEROL, VITAMIN D3, 5,000 UNIT CAPSULE] Take 5,000 Units by mouth daily.      DOCOSAHEXANOIC ACID/EPA (FISH OIL ORAL) [DOCOSAHEXANOIC ACID/EPA (FISH OIL ORAL)] Take 1,200 mg by mouth daily.      FOLIC ACID/MULTIVIT-MIN/LUTEIN (CENTRUM SILVER ORAL) [FOLIC ACID/MULTIVIT-MIN/LUTEIN (CENTRUM SILVER ORAL)] Take 1 tablet by mouth daily.      metoprolol succinate ER (TOPROL-XL) 25 MG 24 hr tablet Take 25 mg by mouth every evening      VIT  C/E/ZN/COPPR/LUTEIN/ZEAXAN (PRESERVISION AREDS 2 ORAL) [VIT C/E/ZN/COPPR/LUTEIN/ZEAXAN (PRESERVISION AREDS 2 ORAL)] Take 1 capsule by mouth 2 (two) times a day.                   Rationale for medication changes:      Please refer to hospital course.        Consults       PHYSICAL THERAPY ADULT IP CONSULT  OCCUPATIONAL THERAPY ADULT IP CONSULT  NEUROLOGY IP CONSULT  PSYCHIATRY IP CONSULT  PHARMACY IP CONSULT  LYMPHEDEMA THERAPY IP CONSULT  SOCIAL WORK IP CONSULT    Immunizations given this encounter       There is no immunization history on file for this patient.        Anticoagulation Information        SIGNIFICANT IMAGING FINDINGS     Results for orders placed or performed during the hospital encounter of 01/12/22   XR Chest 2 Views    Impression    IMPRESSION: Mild to moderate bibasilar pulmonary reticulation likely related to chronic fibrotic changes. Superimposed pneumonic infiltrates are difficult to exclude and short-term follow-up is recommended. No pleural effusion. Upper normal heart size.   No overt vascular congestion or pulmonary edema. Spinal degenerative changes.   Head CT w/o contrast    Impression    IMPRESSION:  HEAD CT:  1. No acute intracranial abnormality.    2. Age-related change.     CERVICAL SPINE CT:  1.  No CT evidence for acute fracture or post traumatic subluxation.     2.  Moderate to marked degenerative canal narrowing at C3-C4.    3.  Multilevel degenerative foraminal narrowing greatest at C6-C7 where it is marked bilaterally.   Cervical spine CT w/o contrast    Impression    IMPRESSION:  HEAD CT:  1. No acute intracranial abnormality.    2. Age-related change.     CERVICAL SPINE CT:  1.  No CT evidence for acute fracture or post traumatic subluxation.     2.  Moderate to marked degenerative canal narrowing at C3-C4.    3.  Multilevel degenerative foraminal narrowing greatest at C6-C7 where it is marked bilaterally.   US Lower Extremity Venous Duplex Bilateral    Impression     IMPRESSION:  1.  No deep venous thrombosis in the bilateral lower extremities.   Echocardiogram Complete   Result Value Ref Range    LVEF  45-50% (mildly reduced)        SIGNIFICANT LABORATORY FINDINGS     Most Recent 3 CBC's:Recent Labs   Lab Test 01/12/22  1831 01/16/20  1722 12/13/19  1118   WBC 6.2 6.4 6.0   HGB 13.0* 14.7 14.4   * 101* 103*    198 204     Most Recent 3 BMP's:Recent Labs   Lab Test 01/14/22  0728 01/12/22  1831 11/02/20  1135    142 143   POTASSIUM 3.8 4.4 5.2*   CHLORIDE 105 105 105   CO2 30 28 29   BUN 25 25 24   CR 0.89 0.99 1.12   ANIONGAP 9 9 9   KACIE 9.2 9.7 9.8   GLC 88 103 106     Most Recent 2 LFT's:Recent Labs   Lab Test 01/12/22  1831 11/02/20  1135   AST 18 17   ALT 10 12   ALKPHOS 84 71   BILITOTAL 0.5 0.5         Discharge Orders        Adult Neurology Referral      Reason for your hospital stay    Admitted for hallucination, confusion.     Follow-up and recommended labs and tests     Follow up with primary care provider, Keith Steward, within 7 days for hospital follow- up and medication refills.  The following labs/tests are recommended: BMP.     Activity    Your activity upon discharge: activity as tolerated     Discharge Instructions    Patient advised to use walker when ambulating.  Patient advised to quarantine for 10 days from COVID-19 positive test on 1/12/2022     MD face to face encounter    Documentation of Face to Face and Certification for Home Health Services    I certify that patient: Juan Tee is under my care and that I, or a nurse practitioner or physician's assistant working with me, had a face-to-face encounter that meets the physician face-to-face encounter requirements with this patient on: 1/14/2022.    This encounter with the patient was in whole, or in part, for the following medical condition, which is the primary reason for home health care: Patient admitted with confusion, hallucination, fall, tested positive for  COVID-19, also found to have a chronic lower extremity swelling who needs home care.    I certify that, based on my findings, the following services are medically necessary home health services: Occupational Therapy, Physical Therapy, and lymphedema team.    My clinical findings support the need for the above services because: Occupational Therapy Services are needed to assess and treat cognitive ability and address ADL safety due to impairment in physical deconditioning., Physical Therapy Services are needed to assess and treat the following functional impairments: Physical deconditioning., and lower extremity edema and needs lymphedema treatment    Further, I certify that my clinical findings support that this patient is homebound (i.e. absences from home require considerable and taxing effort and are for medical reasons or [facility] services or infrequently or of short duration when for other reasons) because: Patient admitted with confusion, hallucination, fall, tested positive for COVID-19, also found to have a chronic lower extremity swelling who needs home care..    Based on the above findings. I certify that this patient is confined to the home and needs intermittent skilled nursing care, physical therapy and/or speech therapy.  The patient is under my care, and I have initiated the establishment of the plan of care.  This patient will be followed by a physician who will periodically review the plan of care.  Physician/Provider to provide follow up care: Keith Steward    Attending hospital physician (the Medicare certified [place] provider): Lorne VELÁZQUEZ MD  Physician Signature: See electronic signature associated with these discharge orders.  Date: 1/14/2022     Diet    Follow this diet upon discharge: Orders Placed This Encounter      Combination Diet Low Saturated Fat Na <2400mg Diet, No Caffeine Diet       Examination   Physical Exam   Temp:  [98.7  F (37.1  C)] 98.7  F (37.1  C)  Pulse:  [54-93]  77  Resp:  [15-18] 18  BP: ()/(53-70) 111/66  SpO2:  [93 %-97 %] 93 %  Wt Readings from Last 1 Encounters:   01/12/22 67.1 kg (148 lb)       Patient seen and examined.  Patient sitting upright in the bed and watching TV.  Patient denied short of breath, chest pain, dizziness, cough or congestion, fever or chills.  Denied abdominal pain, nausea, vomiting.  Discussed with nursing staffs.  Discussed with therapy team.  Discussed with care manager/ for home care with PT, OT and lymphedema treatment and told her that I did MD face-to-face for home care services.  Discussed with family members in detail about plan of care after discharge.  Family members reported that nobody will be home from 4 PM to 10 PM and would like to take patient home after 10 PM.  This was discussed with nursing staffs.      General: Not in obvious distress.  HEENT: Normocephalic, supple neck  Chest: Clear to auscultation bilateral anteriorly, no wheezing  Heart: S1S2 normal, regular  Abdomen: Soft. NT, ND. Bowel sounds- active.  Extremities: Bilateral lower extremity lymphedema wrap  Neuro: alert and awake, grossly non-focal    Please see EMR for more detailed significant labs, imaging, consultant notes etc.    ILorne MD, personally saw the patient today and spent greater than 30 minutes discharging this patient.    Lorne VELÁZQUEZ MD  [facility]    CC:Keith Steward

## 2022-01-14 NOTE — ED NOTES
Flora lang has just spoken with provider here in ED. They are in agreement with plan of discharge this evening at 1030pm. They will pick him up after they finish work here in ED.

## 2022-01-14 NOTE — ED NOTES
Another PT here for lymphedema wrap.  Pt tolerated well.  Awake and interactive with staff.  Vss.  O2 RA.  meds given-tolerated swallowing without diff. .  No c/o at this time. .  Questions wait.  Explanation offered.  Spoke with kyra Laguna on phone and updated on plan.  Pt looking at menu although reports not really hungry at this time. Pt up to bs commode for BM.  Tolerated standing without diff with asst of one and shuffled over to bs commode.  Encouraged to do incentive spirometer every 2 hours.  Pt coop and calm.  nad at this time.

## 2022-01-14 NOTE — ED NOTES
Pt helped to stand at side of bed to void; boxers removed to improve ease of voiding, as there is wet urine on them from previous voids. Bedside commode emptied and returned to room from previous voids. Urinal emptied and returned to room; pt helped back into bed and positioned for comfort. He is still working on his breakfast liquids and fruit.

## 2022-01-15 ENCOUNTER — PATIENT OUTREACH (OUTPATIENT)
Dept: CARE COORDINATION | Facility: CLINIC | Age: 87
End: 2022-01-15
Payer: MEDICARE

## 2022-01-15 DIAGNOSIS — Z71.89 OTHER SPECIFIED COUNSELING: ICD-10-CM

## 2022-01-15 NOTE — ED NOTES
The pt stood and had a moment of feeling unsteady. The pt sat down for several minutes and was then able to stand and  walk down the hallway with cane and no dizziness or having the feeling of being unsteady. The pt was assisted to vehicle where he was able to ambulate to car with minimal assistance, pt denied feeling dizzy or unsteady.

## 2022-01-16 NOTE — PLAN OF CARE
Occupational Therapy Discharge Summary    Reason for therapy discharge:    Discharged to home.    Progress towards therapy goal(s). See goals on Care Plan in [facility] electronic health record for goal details.  Goals not met.  Barriers to achieving goals:   discharge from facility.    Therapy recommendation(s):    No further therapy is recommended.  Mary Reyes, OTR/L  1/16/2022

## 2022-01-18 ENCOUNTER — PATIENT OUTREACH (OUTPATIENT)
Dept: CARE COORDINATION | Facility: CLINIC | Age: 87
End: 2022-01-18
Payer: MEDICARE

## 2022-01-18 NOTE — PROGRESS NOTES
Clinic Care Coordination Contact  Care Team Conversations    Patient identified for care management outreach, however patient is not on a value based contract so cannot complete outreach. Will escalate to clinic staff if specific needs or resources are indicated.    JIMBO Paulino  Social Work Care Coordinator - [facility]  Care Coordination  [email or web address]  Cell Phone: [phone]  Gender pronouns: she/her  Employed by [facility] [employer] [employer] [facility] [employer]

## 2022-01-26 ENCOUNTER — LAB REQUISITION (OUTPATIENT)
Dept: LAB | Facility: CLINIC | Age: 87
End: 2022-01-26
Payer: MEDICARE

## 2022-01-26 DIAGNOSIS — E78.2 MIXED HYPERLIPIDEMIA: ICD-10-CM

## 2022-01-26 PROCEDURE — 80061 LIPID PANEL: CPT | Mod: ORL | Performed by: FAMILY MEDICINE

## 2022-01-26 PROCEDURE — 80053 COMPREHEN METABOLIC PANEL: CPT | Mod: ORL | Performed by: FAMILY MEDICINE

## 2022-01-27 LAB
ALBUMIN SERPL-MCNC: 3.9 G/DL (ref 3.5–5)
ALP SERPL-CCNC: 115 U/L (ref 45–120)
ALT SERPL W P-5'-P-CCNC: 12 U/L (ref 0–45)
ANION GAP SERPL CALCULATED.3IONS-SCNC: 11 MMOL/L (ref 5–18)
AST SERPL W P-5'-P-CCNC: 15 U/L (ref 0–40)
BILIRUB SERPL-MCNC: 0.5 MG/DL (ref 0–1)
BUN SERPL-MCNC: 37 MG/DL (ref 8–28)
CALCIUM SERPL-MCNC: 10.2 MG/DL (ref 8.5–10.5)
CHLORIDE BLD-SCNC: 100 MMOL/L (ref 98–107)
CHOLEST SERPL-MCNC: 105 MG/DL
CO2 SERPL-SCNC: 30 MMOL/L (ref 22–31)
CREAT SERPL-MCNC: 1.45 MG/DL (ref 0.7–1.3)
GFR SERPL CREATININE-BSD FRML MDRD: 47 ML/MIN/1.73M2
GLUCOSE BLD-MCNC: 121 MG/DL (ref 70–125)
HDLC SERPL-MCNC: 44 MG/DL
LDLC SERPL CALC-MCNC: 41 MG/DL
POTASSIUM BLD-SCNC: 4.7 MMOL/L (ref 3.5–5)
PROT SERPL-MCNC: 7 G/DL (ref 6–8)
SODIUM SERPL-SCNC: 141 MMOL/L (ref 136–145)
TRIGL SERPL-MCNC: 100 MG/DL

## 2022-01-27 NOTE — TELEPHONE ENCOUNTER
RECORDS RECEIVED FROM: Internal   REASON FOR VISIT: PD   Date of Appt: 2/1/22   NOTES (FOR ALL VISITS) STATUS DETAILS   OFFICE NOTE from referring provider Internal SEE INPATIENT NOTES   OFFICE NOTE from other specialist N/A    DISCHARGE SUMMARY from hospital Internal [facility]:  1/12/22-1/14/22   DISCHARGE REPORT from the ER N/A    OPERATIVE REPORT N/A    MEDICATION LIST Internal    IMAGING  (FOR ALL VISITS)     EMG N/A    EEG N/A    LUMBAR PUNCTURE N/A    DON SCAN N/A    ULTRASOUND (CAROTID BILAT) *VASCULAR* N/A    MRI (HEAD, NECK, SPINE) N/A    CT (HEAD, NECK, SPINE) [facility]:  CT Cervical Spine 1/12/22  CT Head 1/12/22

## 2022-02-01 ENCOUNTER — PRE VISIT (OUTPATIENT)
Dept: NEUROLOGY | Facility: CLINIC | Age: 87
End: 2022-02-01

## 2022-02-01 ENCOUNTER — OFFICE VISIT (OUTPATIENT)
Dept: NEUROLOGY | Facility: CLINIC | Age: 87
End: 2022-02-01
Attending: PSYCHIATRY & NEUROLOGY
Payer: MEDICARE

## 2022-02-01 VITALS
SYSTOLIC BLOOD PRESSURE: 105 MMHG | RESPIRATION RATE: 16 BRPM | OXYGEN SATURATION: 97 % | DIASTOLIC BLOOD PRESSURE: 70 MMHG | HEART RATE: 84 BPM

## 2022-02-01 DIAGNOSIS — R26.89 SHUFFLING GAIT: ICD-10-CM

## 2022-02-01 DIAGNOSIS — G20.A1 PARKINSON'S DISEASE (H): ICD-10-CM

## 2022-02-01 DIAGNOSIS — R29.3 POSTURAL INSTABILITY: Primary | ICD-10-CM

## 2022-02-01 DIAGNOSIS — R26.89 BALANCE PROBLEMS: ICD-10-CM

## 2022-02-01 PROCEDURE — 99205 OFFICE O/P NEW HI 60 MIN: CPT | Performed by: NURSE PRACTITIONER

## 2022-02-01 RX ORDER — CARBIDOPA AND LEVODOPA 25; 100 MG/1; MG/1
TABLET ORAL
Qty: 90 TABLET | Refills: 4 | Status: SHIPPED | OUTPATIENT
Start: 2022-02-01 | End: 2022-06-13

## 2022-02-01 ASSESSMENT — UNIFIED PARKINSONS DISEASE RATING SCALE (UPDRS)
SPONTANEITY_OF_MOVEMENT: 3: MODERATE: MODERATE GLOBAL SLOWNESS AND POVERTY OF MOVEMENTS.
AMPLITUDE_RLE: MILD > 1 CM BUT < 3 CM IN MAXIMAL AMPLITUDE.
PARKINSONS_MEDS: OFF
RIGIDITY_RLE: MILD: RIGIDITY DETECTED WITHOUT THE ACTIVATION MANEUVER.  FULL RANGE OF MOTION IS EASILY ACHIEVED.
CONSTANCY_TREMOR_ATREST: SLIGHT: TREMOR AT REST IS PRESENT  25% OF THE ENTIRE EXAMINATION PERIOD.
RIGIDITY_NECK: MODERATE: RIGIDITY DETECTED WITHOUT THE ACTIVATION MANEUVER. FULL RANGE OF MOTION IS ACHIEVED WITH EFFORT.
POSTURAL_STABILITY: MODERATE: STANDS SAFELY, BUT WITH ABSENCE OF POSTURAL RESPONSE,  FALLS IF NOT CAUGHT BY EXAMINER.
AMPLITUDE_LIP_JAW: NORMAL: NO TREMOR.
FREEZING_GAIT: SLIGHT:  FREEZES ON STARTING, TURNING, OR WALKING THROUGH DOOWAY WITH A SINGLE HALT DURING ANY OF THESE EVENTS, BUT THEN CONTINUES SMOOTHLY WITHOUT FREEZING DURING STRAIGHT WALKING.
TOTAL_SCORE_LEFT: 16
TOTAL_SCORE: 53
ARISING_CHAIR: MILD:  PUSHES SELF UP FROM ARMS OF CHAIR WITHOUT DIFFICULTY.
RIGIDITY_LLE: MILD: RIGIDITY DETECTED WITHOUT THE ACTIVATION MANEUVER.  FULL RANGE OF MOTION IS EASILY ACHIEVED.
LEG_AGILITY_LEFT: SLIGHT: ANY OF THE FOLLOWING: A) THE REGULAR RHYTHM IS BROKEN WITH ONE WITH ONE OR TWO INTERRUPTIONS OR HESITATIONS OF THE MOVEMENT B) SLIGHT SLOWING C) THE AMPLITUDE DECREMENTS NEAR THE END OF THE 10 MOVEMENTS.
HANDMOVEMENTS_LEFT: MILD: ANY OF THE FOLLOWING: A) 3 TO 5 INTERRUPTIONS DURING TAPPING B) MILD SLOWING C) THE AMPLITUDE DECREMENTS MIDWAY IN THE 10-MOVEMENT SEQUENCE
FINGER_TAPPING_RIGHT: SLIGHT: ANY OF THE FOLLOWING: A) THE REGULAR RHYTHM IS BROKEN WITH ONE WITH ONE OR TWO INTERRUPTIONS OR HESITATIONS OF THE MOVEMENT B) SLIGHT SLOWING C) THE AMPLITUDE DECREMENTS NEAR THE END OF THE 10 MOVEMENTS.
PRONATION_SUPINATION_LEFT: MODERATE: ANY OF THE FOLLOWING:  A) MORE THAN 5 INTERRUPTIONS  OR AT LEAST ONE LONGER ARREST (FREEZE) IN ONGOING MOVEMENT  B) MODERATE SLOWING C) THE AMPLITUDE DECREMENTS STARTING AFTER THE FIRST MOVEMENT.
PRONATION_SUPINATION_RIGHT: MODERATE: ANY OF THE FOLLOWING:  A) MORE THAN 5 INTERRUPTIONS  OR AT LEAST ONE LONGER ARREST (FREEZE) IN ONGOING MOVEMENT  B) MODERATE SLOWING C) THE AMPLITUDE DECREMENTS STARTING AFTER THE FIRST MOVEMENT.
LEG_AGILITY_RIGHT: SLIGHT: ANY OF THE FOLLOWING: A) THE REGULAR RHYTHM IS BROKEN WITH ONE WITH ONE OR TWO INTERRUPTIONS OR HESITATIONS OF THE MOVEMENT B) SLIGHT SLOWING C) THE AMPLITUDE DECREMENTS NEAR THE END OF THE 10 MOVEMENTS.
SPEECH: NORMAL
TOETAPPING_LEFT: SLIGHT: ANY OF THE FOLLOWING: A) THE REGULAR RHYTHM IS BROKEN WITH ONE WITH ONE OR TWO INTERRUPTIONS OR HESITATIONS OF THE MOVEMENT B) SLIGHT SLOWING C) THE AMPLITUDE DECREMENTS NEAR THE END OF THE 10 MOVEMENTS.
RIGIDITY_LUE: MODERATE: RIGIDITY DETECTED WITHOUT THE ACTIVATION MANEUVER. FULL RANGE OF MOTION IS ACHIEVED WITH EFFORT.
RIGIDITY_RUE: SEVERE: RIGIDITY DETECTED WITHOUT THE ACTIVATION MANEUVER AND FULL RANGE OF MOTION NOT ACHIEVED.
FINGER_TAPPING_LEFT: MILD: ANY OF THE FOLLOWING: A) 3 TO 5 INTERRUPTIONS DURING TAPPING B) MILD SLOWING C) THE AMPLITUDE DECREMENTS MIDWAY IN THE 10-MOVEMENT SEQUENCE
GAIT: MILD: INDEPENDENT WALKING BUT WITH SUBSTANTIAL GAIT IMPAIRMENT.
HANDMOVEMENTS_RIGHT: MILD: ANY OF THE FOLLOWING: A) 3 TO 5 INTERRUPTIONS DURING TAPPING B) MILD SLOWING C) THE AMPLITUDE DECREMENTS MIDWAY IN THE 10-MOVEMENT SEQUENCE
AMPLITUDE_LLE: NORMAL: NO TREMOR.
FACIAL_EXPRESSION: MILD: IN ADDITION TO DECREASED EYE-BLINK FREQUENCY, MASKED FACIES PRESENT IN THE LOWER FACE AS WELL, NAMELY FEWER MOVEMENTS AROUND THE MOUTH, SUCH AS LESS SPONTANEOUS SMILING, BUT LIPS NOT PARTED.
POSTURE: 2 MILD.  DEFINITE FLEXION, SCOLIOSIS OR LEANING OT ONE SIDE, BUT CAN CORRECT POSTURE TO NORMAL WHEN ASKED.
AMPLITUDE_RUE: SLIGHT: < 1 CM IN MAXIMAL AMPLITUDE.
TOETAPPING_RIGHT: NORMAL
TOTAL_SCORE: 18
AMPLITUDE_LUE: NORMAL: NO TREMOR.
DYSKINESIAS_PRESENT: NO
AXIAL_SCORE: 18

## 2022-02-01 ASSESSMENT — PAIN SCALES - GENERAL: PAINLEVEL: NO PAIN (0)

## 2022-02-01 NOTE — PROGRESS NOTES
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 He has mild-moderate slowness and rigidity, shuffling gait, balance problem, reduced arm swing, and rest tremor in RUE & RLE.     Constipation: Managing it with OTC stool softer and laxative.     Visual Hallucinations: Resolved after his COVID-19 illness is better.      Confusion: No longer an issues.  Most likely due to recent infection.      PLAN:    __ Discussed about the diagnosis of PD and treatment. All questions answered.  __ Due to limited time, a memory assessment wasn't completed. Will do [place] Cognitive Assessment (MOCA) next time.   __ The following plan provided: -    __  Based on your physical symptoms, you have Parkinson's disease.   __  Start Take 1 tablet in the morning for 1 week; then 1 tablet twice daily for 1 week; then 1 tablet 3 x per day. Take pills 1 hour before meals.  If nauseated, may take it with toast, fruits, or crackers. Take first dose shortly after you get up then 4 - 5 hours apart.    __  It's best to take dose 60 minutes before meals  __  If experiencing upset stomach, take it with foods that don t contain proteins  __  Protein interferes with the absorption of Sinemet. Iron & Multi-Vitamin also decreases absorption  __  Saliva, urine, or sweat color may be dark (red, brown, or black)   __  Important to take it at regular intervals to prevent  wearing-off  effect at the end of the dosing interval    Monitor for common side effects:   __  Nausea /Vomiting  __  Loss of appetite   __  Lightheadedness   __  Lowered blood pressure   __  Confusion   __  Somnolence   __  Dyskinesia (involuntary movements): Due to too much Sinemet or with long-term therapy; 3-5 years     __  Continue to do the Physical Therapy exercises daily.  __  Return in 2 months. You may return sooner as needed.            MOVEMENT DISORDERS CLINIC           REFERRING PROVIDER:  Post-hospitalization, per discharge plan.  "    PATIENT: Juan Tee    : 1935    AMMON: 2022    REASON FOR VISIT: Consult for Parkinson's disease (PD).     HPI: Mr. Juan Tee is a 86 year old  male who came to the [facility] neurology clinic accompanied by his niece, Krista and nephew for evaluation of PD as part of a discharge plan after recent hospitalization.    Per his niece, his symptom initially was noted in 2021 by a cousin who is a nurse. They were at a wedding and his cousin noticed him to have shaking in his hands and was drooling. She suspected PD; however, due to COVID, his family didn't take him to be evaluated.    Pt was hospitalized on 2022. Prior to that, patient's niece and nephew were sick with COVID. On , 2022, patient was confused, talking to himself. He had hallucinations. He got up at 4 am and got dressed to go. Prior to that weekend pt reports no difficulty with memory. He was shaking while holding the news paper. During that weekend, he fell twice. Pt reports that he slipped off his bed when trying to put his shoes.    Post hospitalization, he has doing PT/OT and .      Record from ER on 2022 were reviewed.  EKG, chest x-ray, CBC with plt, BMP, Hepatic panel, Troponin, TSH, UA/UC, COVID-19, Head & C-spine CT w/o contrast - reviewed.    He had a neurology consult during his hospitalization and was thought he had PD symptoms. Pt reports no family Hx of movement disorders.     ROS: -     MOTOR FUNCTION   Tremor: Pt doesn't notice tremors. His niece reports that he has tremors in hands and when standing.   Bradykinesia: Pt said no. Family reported, \"absolutely.\"  Speech: No. Niece thinks his speech has been soft.   Rigidity: No.  Gait: Pt reports no change.  Family report shuffling gait, difficulty initiating gait, and imbalance.  He uses a cane. He exercises in the driveway and when he walks, he uses a walker.  Falls: As above.  Dyskinesia: " "No.  Dystonia: No.  Pain: No.  Numbness: No.   Tingling: Bilateral tingling in toes.      AUTONOMIC FUNCTION   Orthostatic Hypotension: Occasionally.    Constipation: \"Always.\" Started in the last 3 - 4 years. He uses metamucil Gummies twice a day, MiraLax gives him the diarrhea.    Urinary symptoms: Has urgency.  Family has noticed that he misses the toilet.     MENTATION, BEHAVIOR, MOOD   Depression, anxiety: No.  Fatigue: No.   Memory problems: Pt and family report memory is good until the week he had COVID.   Confusion, hallucinations: Yes, during his COVID infection. This has cleared up.   Sleep Problems: When he sleeps, he argues and yells. The speech is hard to understand. He dreams \"all the time.\"    OTHERS   Trouble swallowing, drooling: No problem with swallowing. Pt reports that he doesn't notice drooling; but family report that he drools a lot. When he eats, the drooling is constant.   Changes in sense of smelling: Yes. This has been an issue for a long time.   Trouble with handwriting: Reports that his writing is smaller.   Trouble with handling utensils, dressing: Family cut his food. Zippers and buttoning are hard.   Exercise: He has a dozen of PT exercises that he tries to do.    DATA REVIEW:   CT -- Head and C-Spine - on 1/12/2022    IMPRESSION:  HEAD CT:  1. No acute intracranial abnormality.  2. Age-related change.      CERVICAL SPINE CT:  1.  No CT evidence for acute fracture or post traumatic subluxation.   2.  Moderate to marked degenerative canal narrowing at C3-C4.  3.  Multilevel degenerative foraminal narrowing greatest at C6-C7 where it is marked bilaterally.    MEDICATIONS:  Outpatient Medications Marked as Taking for the 2/1/22 encounter (Office Visit) with Antonella Kaminski APRN CNP   Medication Sig     aspirin 81 mg chewable tablet Take 81 mg by mouth every evening      atorvastatin (LIPITOR) 10 MG tablet Take 10 mg by mouth every evening     azaTHIOprine (IMURAN) 50 mg tablet Take " 25 mg by mouth daily      CALCIUM ORAL [CALCIUM ORAL] Take 1 tablet by mouth 2 (two) times a day.     cholecalciferol, vitamin D3, 5,000 unit capsule [CHOLECALCIFEROL, VITAMIN D3, 5,000 UNIT CAPSULE] Take 5,000 Units by mouth daily.     DOCOSAHEXANOIC ACID/EPA (FISH OIL ORAL) [DOCOSAHEXANOIC ACID/EPA (FISH OIL ORAL)] Take 1,200 mg by mouth daily.     FOLIC ACID/MULTIVIT-MIN/LUTEIN (CENTRUM SILVER ORAL) [FOLIC ACID/MULTIVIT-MIN/LUTEIN (CENTRUM SILVER ORAL)] Take 1 tablet by mouth daily.     furosemide (LASIX) 20 MG tablet Take 1 tablet (20 mg) by mouth daily     melatonin 1 MG TABS tablet Take 1 tablet (1 mg) by mouth nightly as needed for sleep     metoprolol succinate ER (TOPROL-XL) 25 MG 24 hr tablet Take 25 mg by mouth every evening     spironolactone (ALDACTONE) 25 MG tablet Take 2 tablets (50 mg) by mouth daily     VIT C/E/ZN/COPPR/LUTEIN/ZEAXAN (PRESERVISION AREDS 2 ORAL) [VIT C/E/ZN/COPPR/LUTEIN/ZEAXAN (PRESERVISION AREDS 2 ORAL)] Take 1 capsule by mouth 2 (two) times a day.       ALLERGIES: Patient has no known allergies.    PROBLEM LIST:  Dyslipidemia, CAD, Typical Polymyositis (Type I), Delirium, fall, hallucinations, HTN, skin cancer.     PSH:  OPEN RX PATELLA FX and hernia repair.    FH: Acute Myocardial Infarction (age of onset: 68) in his brother; CABG (age of onset: 72) in his brother; Cerebrovascular Disease (age of onset: 68) in his father; Congenital heart disease (age of onset: 0) in his brother; Coronary Artery Disease in his brother; Kidney failure (age of onset: 54) in his mother; No Known Problems in his sister, sister, sister, and sister; Rheumatic fever in his sister; Sudden Death (age of onset: 72) in his father.    SH: Never . Lives with niece and nephew. Former cigarette smoker. 1 pack/day for 8 years. Quit in 1962. Drinks alcohol rarely. No drug use.     PHYSICAL EXAM:     Vital Signs: Blood pressure 105/70, pulse 84, resp. rate 16, SpO2 97 %.     General: Mr. Tee is a  pleasant  male who is well-groomed, well-developed and thin sitting comfortably in the exam room without any distress. Affect is appropriate.     Skin: Warm, dry and has band aid on scalp from a . Extremities: Bilateral LE edema and reddened skin.     Mental Status: Alert and oriented. He provided some of his medical Hx with good clarity and memory.     Parkinson's disease Assessment:   MDS UPDRS III  UPDRS Values 2/1/2022   Time: 11:17 AM   Medication Off   R Brain DBS: None   L Brain DBS: None   Dyskinesia (LID) No   Speech 0   Facial Expression 2   Rigidity Neck 3   Rigidity RUE 4   Rigidity LUE 3   Rigidity RLE 2   Rigidity LLE 2   Finger Taps R 1   Finger Taps L 2   Hand Mvt R 2   Hand Mvt L 2   Pron-/Supinate R 3   Pron-/Supinate L 3   Toe Tap R 0   Toe Tap L 1   Leg Agility R 1   Leg Agility L 1   Arise From Chair 2   Gait 2   Gait Freezing 1   Postural Stability 3   Posture 2   Global Spont Mvt 3   Postural Tremor RUE 1   Postural Tremor LUE 1   Kinetic Tremor RUE 1   Kinetic Tremor LUE 1   Rest Tremor RUE 1   Rest Tremor LUE 0   Rest Tremor RLE 2   Rest Tremor LLE 0   Rest Tremor Lip/Jaw 0   Rest Tremor Constancy 1   Total Right 18   Total Left 16   Axial Total 18   Total 53     Today I spent 85 minutes caring for the patient. Time was spent with reviewing records, meeting with the patient, answering questions, examining, ordering medication, and documentation.    TRINITY Ch, CNP  [facility] Neurology Clinic

## 2022-02-01 NOTE — PATIENT INSTRUCTIONS
Dear . Juan Clarkdawnaminesh,    Thank you for coming today.  During your visit, we have discussed the following:     __  Based on your physical symptoms, you have Parkinson's disease.     __  Start Take 1 tablet in the morning for 1 week; then 1 tablet twice daily for 1 week; then 1 tablet 3 x per day. Take pills 1 hour before meals.  If nauseated, may take it with toast, fruits, or crackers. Take first dose shortly after you get up then 4 - 5 hours apart.    __  It's best to take dose 60 minutes before meals  __  If experiencing upset stomach, take it with foods that don t contain proteins  __  Protein interferes with the absorption of Sinemet. Iron & Multi-Vitamin also decreases absorption  __  Saliva, urine, or sweat color may be dark (red, brown, or black)   __  Important to take it at regular intervals to prevent  wearing-off  effect at the end of the dosing interval    Monitor for common side effects:   __  Nausea /Vomiting  __  Loss of appetite   __  Lightheadedness   __  Lowered blood pressure   __  Confusion   __  Somnolence   __  Dyskinesia (involuntary movements): Due to too much Sinemet or with long-term therapy; 3-5 years       __  Continue to do the Physical Therapy exercises daily.    __  Return in 2 months. You may return sooner as needed.      For questions, you may send us a [employer] message or call [phone]    Fax number: [phone]    TRINITY Ch, CNP  [facility] [email or web address]] Neurology Clinic

## 2022-02-01 NOTE — LETTER
2/1/2022       RE: Juan Tee  [address] [place] [address]     Dear Colleague,    Thank you for referring your patient, Juan Tee, to the [facility] NEUROLOGY CLINIC [place] at [facility]. Please see a copy of my visit note below.      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 He has mild-moderate slowness and rigidity, shuffling gait, balance problem, reduced arm swing, and rest tremor in RUE & RLE.     Constipation: Managing it with OTC stool softer and laxative.     Visual Hallucinations: Resolved after his COVID-19 illness is better.      Confusion: No longer an issues.  Most likely due to recent infection.      PLAN:    __ Discussed about the diagnosis of PD and treatment. All questions answered.  __ Due to limited time, a memory assessment wasn't completed. Will do [place] Cognitive Assessment (MOCA) next time.   __ The following plan provided: -    __  Based on your physical symptoms, you have Parkinson's disease.   __  Start Take 1 tablet in the morning for 1 week; then 1 tablet twice daily for 1 week; then 1 tablet 3 x per day. Take pills 1 hour before meals.  If nauseated, may take it with toast, fruits, or crackers. Take first dose shortly after you get up then 4 - 5 hours apart.    __  It's best to take dose 60 minutes before meals  __  If experiencing upset stomach, take it with foods that don t contain proteins  __  Protein interferes with the absorption of Sinemet. Iron & Multi-Vitamin also decreases absorption  __  Saliva, urine, or sweat color may be dark (red, brown, or black)   __  Important to take it at regular intervals to prevent  wearing-off  effect at the end of the dosing interval    Monitor for common side effects:   __  Nausea /Vomiting  __  Loss of appetite   __  Lightheadedness   __  Lowered blood pressure   __  Confusion   __  Somnolence    __  Dyskinesia (involuntary movements): Due to too much Sinemet or with long-term therapy; 3-5 years     __  Continue to do the Physical Therapy exercises daily.  __  Return in 2 months. You may return sooner as needed.            MOVEMENT DISORDERS CLINIC           REFERRING PROVIDER:  Post-hospitalization, per discharge plan.     PATIENT: Juan Tee    : 1935    AMMON: 2022    REASON FOR VISIT: Consult for Parkinson's disease (PD).     HPI: . Juan Tee is a 86 year old  male who came to the [facility] neurology clinic accompanied by his niece, Krista and nephew for evaluation of PD as part of a discharge plan after recent hospitalization.    Per his niece, his symptom initially was noted in 2021 by a cousin who is a nurse. They were at a wedding and his cousin noticed him to have shaking in his hands and was drooling. She suspected PD; however, due to COVID, his family didn't take him to be evaluated.    Pt was hospitalized on 2022. Prior to that, patient's niece and nephew were sick with COVID. On , 2022, patient was confused, talking to himself. He had hallucinations. He got up at 4 am and got dressed to go. Prior to that weekend pt reports no difficulty with memory. He was shaking while holding the news paper. During that weekend, he fell twice. Pt reports that he slipped off his bed when trying to put his shoes.    Post hospitalization, he has doing PT/OT and .      Record from ER on 2022 were reviewed.  EKG, chest x-ray, CBC with plt, BMP, Hepatic panel, Troponin, TSH, UA/UC, COVID-19, Head & C-spine CT w/o contrast - reviewed.    He had a neurology consult during his hospitalization and was thought he had PD symptoms. Pt reports no family Hx of movement disorders.     ROS: -     MOTOR FUNCTION   Tremor: Pt doesn't notice tremors. His niece reports that he has tremors in hands and when standing.   Bradykinesia: Pt  "said no. Family reported, \"absolutely.\"  Speech: No. Niece thinks his speech has been soft.   Rigidity: No.  Gait: Pt reports no change.  Family report shuffling gait, difficulty initiating gait, and imbalance.  He uses a cane. He exercises in the driveway and when he walks, he uses a walker.  Falls: As above.  Dyskinesia: No.  Dystonia: No.  Pain: No.  Numbness: No.   Tingling: Bilateral tingling in toes.      AUTONOMIC FUNCTION   Orthostatic Hypotension: Occasionally.    Constipation: \"Always.\" Started in the last 3 - 4 years. He uses metamucil Gummies twice a day, MiraLax gives him the diarrhea.    Urinary symptoms: Has urgency.  Family has noticed that he misses the toilet.     MENTATION, BEHAVIOR, MOOD   Depression, anxiety: No.  Fatigue: No.   Memory problems: Pt and family report memory is good until the week he had COVID.   Confusion, hallucinations: Yes, during his COVID infection. This has cleared up.   Sleep Problems: When he sleeps, he argues and yells. The speech is hard to understand. He dreams \"all the time.\"    OTHERS   Trouble swallowing, drooling: No problem with swallowing. Pt reports that he doesn't notice drooling; but family report that he drools a lot. When he eats, the drooling is constant.   Changes in sense of smelling: Yes. This has been an issue for a long time.   Trouble with handwriting: Reports that his writing is smaller.   Trouble with handling utensils, dressing: Family cut his food. Zippers and buttoning are hard.   Exercise: He has a dozen of PT exercises that he tries to do.    DATA REVIEW:   CT -- Head and C-Spine - on 1/12/2022    IMPRESSION:  HEAD CT:  1. No acute intracranial abnormality.  2. Age-related change.      CERVICAL SPINE CT:  1.  No CT evidence for acute fracture or post traumatic subluxation.   2.  Moderate to marked degenerative canal narrowing at C3-C4.  3.  Multilevel degenerative foraminal narrowing greatest at C6-C7 where it is marked " bilaterally.    MEDICATIONS:  Outpatient Medications Marked as Taking for the 2/1/22 encounter (Office Visit) with Antonella Kaminski APRN CNP   Medication Sig     aspirin 81 mg chewable tablet Take 81 mg by mouth every evening      atorvastatin (LIPITOR) 10 MG tablet Take 10 mg by mouth every evening     azaTHIOprine (IMURAN) 50 mg tablet Take 25 mg by mouth daily      CALCIUM ORAL [CALCIUM ORAL] Take 1 tablet by mouth 2 (two) times a day.     cholecalciferol, vitamin D3, 5,000 unit capsule [CHOLECALCIFEROL, VITAMIN D3, 5,000 UNIT CAPSULE] Take 5,000 Units by mouth daily.     DOCOSAHEXANOIC ACID/EPA (FISH OIL ORAL) [DOCOSAHEXANOIC ACID/EPA (FISH OIL ORAL)] Take 1,200 mg by mouth daily.     FOLIC ACID/MULTIVIT-MIN/LUTEIN (CENTRUM SILVER ORAL) [FOLIC ACID/MULTIVIT-MIN/LUTEIN (CENTRUM SILVER ORAL)] Take 1 tablet by mouth daily.     furosemide (LASIX) 20 MG tablet Take 1 tablet (20 mg) by mouth daily     melatonin 1 MG TABS tablet Take 1 tablet (1 mg) by mouth nightly as needed for sleep     metoprolol succinate ER (TOPROL-XL) 25 MG 24 hr tablet Take 25 mg by mouth every evening     spironolactone (ALDACTONE) 25 MG tablet Take 2 tablets (50 mg) by mouth daily     VIT C/E/ZN/COPPR/LUTEIN/ZEAXAN (PRESERVISION AREDS 2 ORAL) [VIT C/E/ZN/COPPR/LUTEIN/ZEAXAN (PRESERVISION AREDS 2 ORAL)] Take 1 capsule by mouth 2 (two) times a day.       ALLERGIES: Patient has no known allergies.    PROBLEM LIST:  Dyslipidemia, CAD, Typical Polymyositis (Type I), Delirium, fall, hallucinations, HTN, skin cancer.     PSH:  OPEN RX PATELLA FX and hernia repair.    FH: Acute Myocardial Infarction (age of onset: 68) in his brother; CABG (age of onset: 72) in his brother; Cerebrovascular Disease (age of onset: 68) in his father; Congenital heart disease (age of onset: 0) in his brother; Coronary Artery Disease in his brother; Kidney failure (age of onset: 54) in his mother; No Known Problems in his sister, sister, sister, and sister; Rheumatic  fever in his sister; Sudden Death (age of onset: 72) in his father.    SH: Never . Lives with niece and nephew. Former cigarette smoker. 1 pack/day for 8 years. Quit in 1962. Drinks alcohol rarely. No drug use.     PHYSICAL EXAM:     Vital Signs: Blood pressure 105/70, pulse 84, resp. rate 16, SpO2 97 %.     General: Mr. Tee is a pleasant  male who is well-groomed, well-developed and thin sitting comfortably in the exam room without any distress. Affect is appropriate.     Skin: Warm, dry and has band aid on scalp from a . Extremities: Bilateral LE edema and reddened skin.     Mental Status: Alert and oriented. He provided some of his medical Hx with good clarity and memory.     Parkinson's disease Assessment:   MDS UPDRS III  UPDRS Values 2/1/2022   Time: 11:17 AM   Medication Off   R Brain DBS: None   L Brain DBS: None   Dyskinesia (LID) No   Speech 0   Facial Expression 2   Rigidity Neck 3   Rigidity RUE 4   Rigidity LUE 3   Rigidity RLE 2   Rigidity LLE 2   Finger Taps R 1   Finger Taps L 2   Hand Mvt R 2   Hand Mvt L 2   Pron-/Supinate R 3   Pron-/Supinate L 3   Toe Tap R 0   Toe Tap L 1   Leg Agility R 1   Leg Agility L 1   Arise From Chair 2   Gait 2   Gait Freezing 1   Postural Stability 3   Posture 2   Global Spont Mvt 3   Postural Tremor RUE 1   Postural Tremor LUE 1   Kinetic Tremor RUE 1   Kinetic Tremor LUE 1   Rest Tremor RUE 1   Rest Tremor LUE 0   Rest Tremor RLE 2   Rest Tremor LLE 0   Rest Tremor Lip/Jaw 0   Rest Tremor Constancy 1   Total Right 18   Total Left 16   Axial Total 18   Total 53     Today I spent 85 minutes caring for the patient. Time was spent with reviewing records, meeting with the patient, answering questions, examining, ordering medication, and documentation.    TRINITY Ch, CNP  [facility] Neurology Clinic

## 2022-02-01 NOTE — NURSING NOTE
Chief Complaint   Patient presents with     Consult     p new- movement disorder     Parkinson     Karlee Infante

## 2022-02-06 ENCOUNTER — HEALTH MAINTENANCE LETTER (OUTPATIENT)
Age: 87
End: 2022-02-06

## 2022-03-16 ENCOUNTER — LAB REQUISITION (OUTPATIENT)
Dept: LAB | Facility: CLINIC | Age: 87
End: 2022-03-16
Payer: MEDICARE

## 2022-03-16 DIAGNOSIS — I10 ESSENTIAL (PRIMARY) HYPERTENSION: ICD-10-CM

## 2022-03-16 LAB
ANION GAP SERPL CALCULATED.3IONS-SCNC: 14 MMOL/L (ref 5–18)
BUN SERPL-MCNC: 31 MG/DL (ref 8–28)
CALCIUM SERPL-MCNC: 9.8 MG/DL (ref 8.5–10.5)
CHLORIDE BLD-SCNC: 100 MMOL/L (ref 98–107)
CO2 SERPL-SCNC: 28 MMOL/L (ref 22–31)
CREAT SERPL-MCNC: 1.35 MG/DL (ref 0.7–1.3)
GFR SERPL CREATININE-BSD FRML MDRD: 51 ML/MIN/1.73M2
GLUCOSE BLD-MCNC: 107 MG/DL (ref 70–125)
POTASSIUM BLD-SCNC: 4 MMOL/L (ref 3.5–5)
SODIUM SERPL-SCNC: 142 MMOL/L (ref 136–145)

## 2022-03-16 PROCEDURE — 80048 BASIC METABOLIC PNL TOTAL CA: CPT | Mod: ORL | Performed by: FAMILY MEDICINE

## 2022-04-05 ENCOUNTER — OFFICE VISIT (OUTPATIENT)
Dept: NEUROLOGY | Facility: CLINIC | Age: 87
End: 2022-04-05
Payer: MEDICARE

## 2022-04-05 VITALS
DIASTOLIC BLOOD PRESSURE: 68 MMHG | WEIGHT: 154 LBS | OXYGEN SATURATION: 97 % | BODY MASS INDEX: 24.11 KG/M2 | RESPIRATION RATE: 16 BRPM | HEART RATE: 71 BPM | SYSTOLIC BLOOD PRESSURE: 113 MMHG

## 2022-04-05 DIAGNOSIS — G20.A1 PSYCHOSIS DUE TO PARKINSON'S DISEASE (H): ICD-10-CM

## 2022-04-05 DIAGNOSIS — I95.1 ORTHOSTATIC HYPOTENSION: ICD-10-CM

## 2022-04-05 DIAGNOSIS — R44.1 VISUAL HALLUCINATIONS: Primary | ICD-10-CM

## 2022-04-05 DIAGNOSIS — F06.8 PSYCHOSIS DUE TO PARKINSON'S DISEASE (H): ICD-10-CM

## 2022-04-05 DIAGNOSIS — G20.A1 PARKINSON'S DISEASE (H): ICD-10-CM

## 2022-04-05 PROCEDURE — 99215 OFFICE O/P EST HI 40 MIN: CPT | Performed by: NURSE PRACTITIONER

## 2022-04-05 ASSESSMENT — UNIFIED PARKINSONS DISEASE RATING SCALE (UPDRS)
RIGIDITY_LUE: MODERATE: RIGIDITY DETECTED WITHOUT THE ACTIVATION MANEUVER. FULL RANGE OF MOTION IS ACHIEVED WITH EFFORT.
RIGIDITY_RUE: MODERATE: RIGIDITY DETECTED WITHOUT THE ACTIVATION MANEUVER. FULL RANGE OF MOTION IS ACHIEVED WITH EFFORT.
FINGER_TAPPING_RIGHT: SLIGHT: ANY OF THE FOLLOWING: A) THE REGULAR RHYTHM IS BROKEN WITH ONE WITH ONE OR TWO INTERRUPTIONS OR HESITATIONS OF THE MOVEMENT B) SLIGHT SLOWING C) THE AMPLITUDE DECREMENTS NEAR THE END OF THE 10 MOVEMENTS.
FREEZING_GAIT: SLIGHT:  FREEZES ON STARTING, TURNING, OR WALKING THROUGH DOOWAY WITH A SINGLE HALT DURING ANY OF THESE EVENTS, BUT THEN CONTINUES SMOOTHLY WITHOUT FREEZING DURING STRAIGHT WALKING.
TOTAL_SCORE: 52
TOTAL_SCORE_LEFT: 17
AMPLITUDE_LLE: NORMAL: NO TREMOR.
PARKINSONS_MEDS: ON
PRONATION_SUPINATION_RIGHT: MILD: ANY OF THE FOLLOWING: A) 3 TO 5 INTERRUPTIONS DURING TAPPING B) MILD SLOWING C) THE AMPLITUDE DECREMENTS MIDWAY IN THE 10-MOVEMENT SEQUENCE
LEG_AGILITY_LEFT: MILD: ANY OF THE FOLLOWING: A) 3 TO 5 INTERRUPTIONS DURING TAPPING B) MILD SLOWING C) THE AMPLITUDE DECREMENTS MIDWAY IN THE 10-MOVEMENT SEQUENCE
AMPLITUDE_LIP_JAW: NORMAL: NO TREMOR.
HANDMOVEMENTS_LEFT: SLIGHT: ANY OF THE FOLLOWING: A) THE REGULAR RHYTHM IS BROKEN WITH ONE WITH ONE OR TWO INTERRUPTIONS OR HESITATIONS OF THE MOVEMENT B) SLIGHT SLOWING C) THE AMPLITUDE DECREMENTS NEAR THE END OF THE 10 MOVEMENTS.
GAIT: MILD: INDEPENDENT WALKING BUT WITH SUBSTANTIAL GAIT IMPAIRMENT.
RIGIDITY_NECK: MODERATE: RIGIDITY DETECTED WITHOUT THE ACTIVATION MANEUVER. FULL RANGE OF MOTION IS ACHIEVED WITH EFFORT.
FACIAL_EXPRESSION: MILD: IN ADDITION TO DECREASED EYE-BLINK FREQUENCY, MASKED FACIES PRESENT IN THE LOWER FACE AS WELL, NAMELY FEWER MOVEMENTS AROUND THE MOUTH, SUCH AS LESS SPONTANEOUS SMILING, BUT LIPS NOT PARTED.
SPEECH: NORMAL
RIGIDITY_RLE: MILD: RIGIDITY DETECTED WITHOUT THE ACTIVATION MANEUVER.  FULL RANGE OF MOTION IS EASILY ACHIEVED.
FINGER_TAPPING_LEFT: MILD: ANY OF THE FOLLOWING: A) 3 TO 5 INTERRUPTIONS DURING TAPPING B) MILD SLOWING C) THE AMPLITUDE DECREMENTS MIDWAY IN THE 10-MOVEMENT SEQUENCE
TOETAPPING_RIGHT: NORMAL
AMPLITUDE_LUE: SLIGHT: < 1 CM IN MAXIMAL AMPLITUDE.
PRONATION_SUPINATION_LEFT: MILD: ANY OF THE FOLLOWING: A) 3 TO 5 INTERRUPTIONS DURING TAPPING B) MILD SLOWING C) THE AMPLITUDE DECREMENTS MIDWAY IN THE 10-MOVEMENT SEQUENCE
AMPLITUDE_RLE: NORMAL: NO TREMOR.
DYSKINESIAS_PRESENT: NO
CONSTANCY_TREMOR_ATREST: SLIGHT: TREMOR AT REST IS PRESENT  25% OF THE ENTIRE EXAMINATION PERIOD.
RIGIDITY_LLE: MILD: RIGIDITY DETECTED WITHOUT THE ACTIVATION MANEUVER.  FULL RANGE OF MOTION IS EASILY ACHIEVED.
POSTURE: 2 MILD.  DEFINITE FLEXION, SCOLIOSIS OR LEANING OT ONE SIDE, BUT CAN CORRECT POSTURE TO NORMAL WHEN ASKED.
TOTAL_SCORE: 16
POSTURAL_STABILITY: MODERATE: STANDS SAFELY, BUT WITH ABSENCE OF POSTURAL RESPONSE,  FALLS IF NOT CAUGHT BY EXAMINER.
AXIAL_SCORE: 18
AMPLITUDE_RUE: MILD > 1 CM BUT < 3 CM IN MAXIMAL AMPLITUDE.
TOETAPPING_LEFT: MILD: ANY OF THE FOLLOWING: A) 3 TO 5 INTERRUPTIONS DURING TAPPING B) MILD SLOWING C) THE AMPLITUDE DECREMENTS MIDWAY IN THE 10-MOVEMENT SEQUENCE
LEG_AGILITY_RIGHT: MILD: ANY OF THE FOLLOWING: A) 3 TO 5 INTERRUPTIONS DURING TAPPING B) MILD SLOWING C) THE AMPLITUDE DECREMENTS MIDWAY IN THE 10-MOVEMENT SEQUENCE
ARISING_CHAIR: MILD:  PUSHES SELF UP FROM ARMS OF CHAIR WITHOUT DIFFICULTY.
SPONTANEITY_OF_MOVEMENT: 3: MODERATE: MODERATE GLOBAL SLOWNESS AND POVERTY OF MOVEMENTS.
HANDMOVEMENTS_RIGHT: SLIGHT: ANY OF THE FOLLOWING: A) THE REGULAR RHYTHM IS BROKEN WITH ONE WITH ONE OR TWO INTERRUPTIONS OR HESITATIONS OF THE MOVEMENT B) SLIGHT SLOWING C) THE AMPLITUDE DECREMENTS NEAR THE END OF THE 10 MOVEMENTS.

## 2022-04-05 NOTE — NURSING NOTE
Chief Complaint   Patient presents with     RECHECK     return movement disorder      Topher Spear

## 2022-04-05 NOTE — PATIENT INSTRUCTIONS
Dear Mr. Juan Clarkdawnaminesh,    Thank you for coming today.  During your visit, we have discussed the following:     __  Will start Nuplazid 10 mg once daily to help with hallucinations.     __  In the differential diagnosis Lewy Body Dementia is a possibility.    __  Today [place] Cognitive Assessment was completed.  The score was 15/30. Normal is 26 or above.     __  Occupational Therapy - for cognitive assessment.  They will call you.     __  You can call Grace Zaragoza,  [phone] about long-term care and placement.     __  For now, let's keep the Sinemet 25/100 mg at 1 tab 3 x a day.     __  Video visit in 10 week. You may return sooner as needed.      For questions, you may send us a [email or web address] message or call [phone]    Fax number: [phone]    TRINITY Ch, CNP  [facility] Neurology Clinic

## 2022-04-06 ENCOUNTER — TELEPHONE (OUTPATIENT)
Dept: NEUROLOGY | Facility: CLINIC | Age: 87
End: 2022-04-06
Payer: MEDICARE

## 2022-04-06 PROBLEM — E78.2 MIXED HYPERLIPIDEMIA: Status: ACTIVE | Noted: 2022-04-06

## 2022-04-06 PROBLEM — I83.019: Status: ACTIVE | Noted: 2022-04-06

## 2022-04-06 PROBLEM — I25.9 ISCHEMIC HEART DISEASE: Status: ACTIVE | Noted: 2022-04-06

## 2022-04-06 PROBLEM — I49.9 CARDIAC ARRHYTHMIA: Status: ACTIVE | Noted: 2022-04-06

## 2022-04-06 PROBLEM — M79.10 MYALGIA: Status: ACTIVE | Noted: 2022-04-06

## 2022-04-06 PROBLEM — G20.A1 PARKINSONS DISEASE (H): Status: ACTIVE | Noted: 2022-04-06

## 2022-04-06 PROBLEM — R73.03 PREDIABETES: Status: ACTIVE | Noted: 2022-04-06

## 2022-04-06 PROBLEM — I10 ESSENTIAL HYPERTENSION: Status: ACTIVE | Noted: 2022-04-06

## 2022-04-06 PROBLEM — I87.2 PERIPHERAL VENOUS INSUFFICIENCY: Status: ACTIVE | Noted: 2022-04-06

## 2022-04-06 PROBLEM — G93.41 METABOLIC ENCEPHALOPATHY: Status: ACTIVE | Noted: 2022-04-06

## 2022-04-06 PROBLEM — M76.61 ACHILLES TENDINITIS OF RIGHT LOWER EXTREMITY: Status: ACTIVE | Noted: 2022-04-06

## 2022-04-06 PROBLEM — I25.10 ATHEROSCLEROSIS OF CORONARY ARTERY: Status: ACTIVE | Noted: 2022-04-06

## 2022-04-06 NOTE — TELEPHONE ENCOUNTER
Prior Authorization Approval    Authorization Effective Date: 1/6/2022  Authorization Expiration Date: 4/6/2023  Medication: Nuplazid 34MG Capsules (PENDING)  Approved Dose/Quantity: 30 days  Reference #:     Insurance Company: Medicare [employer] - Phone [phone] Fax [phone]  Expected CoPay: $1927.34     CoPay Card Available: No    Foundation Assistance Needed:    Which Pharmacy is filling the prescription (Not needed for infusion/clinic administered): [facility] - [place], [place] - [address]  Pharmacy Notified:    Patient Notified:      Cost is high ($1927.34) due to Medicare Part D donut hole (coverage gap). Unfortunately, all Parkinson's disease grants are closed at this time.    Unless, patient chooses to pay out of pocket, Nuplazid - [place] [facility] may be the patient's only option to receive medication free of charge if they meet program criteria (income, coverage, etc.).          Thank you,    Maria Luz Johnson [facility]  Specialty Pharmacy Clinic Liaison - CardiologyNeurologyMultiple Sclerosis  [facility] [address]  [address] [place] [address]  Ph: [phone] Fax: [phone]  [email or web address]

## 2022-04-07 ENCOUNTER — MEDICAL CORRESPONDENCE (OUTPATIENT)
Dept: HEALTH INFORMATION MANAGEMENT | Facility: CLINIC | Age: 87
End: 2022-04-07
Payer: MEDICARE

## 2022-04-07 ENCOUNTER — DOCUMENTATION ONLY (OUTPATIENT)
Dept: NEUROLOGY | Facility: CLINIC | Age: 87
End: 2022-04-07
Payer: MEDICARE

## 2022-04-07 NOTE — TELEPHONE ENCOUNTER
Nuplazid [facility] start form faxed to clinic at [phone] for MD's signatures.     Thank you,    Maria Luz Johnson [facility]  Specialty Pharmacy Clinic Liaison - CardiologyNeurologyMultiple Sclerosis  [facility] [address]  [address] [place] [address]  Ph: [phone] Fax: [phone]  [email or web address]

## 2022-04-07 NOTE — PROGRESS NOTES
Received treatment form, form was signed by provider and fax to [phone], and sent to be scanned

## 2022-04-12 NOTE — TELEPHONE ENCOUNTER
Received notice from [facility] that they received the enrollment form, but unfortunately, the Prescriber signature from section is missing:

## 2022-04-20 ENCOUNTER — LAB REQUISITION (OUTPATIENT)
Dept: LAB | Facility: CLINIC | Age: 87
End: 2022-04-20
Payer: MEDICARE

## 2022-04-20 DIAGNOSIS — I10 ESSENTIAL (PRIMARY) HYPERTENSION: ICD-10-CM

## 2022-04-20 LAB
ANION GAP SERPL CALCULATED.3IONS-SCNC: 11 MMOL/L (ref 5–18)
BUN SERPL-MCNC: 26 MG/DL (ref 8–28)
CALCIUM SERPL-MCNC: 9.4 MG/DL (ref 8.5–10.5)
CHLORIDE BLD-SCNC: 102 MMOL/L (ref 98–107)
CO2 SERPL-SCNC: 29 MMOL/L (ref 22–31)
CREAT SERPL-MCNC: 1.19 MG/DL (ref 0.7–1.3)
GFR SERPL CREATININE-BSD FRML MDRD: 60 ML/MIN/1.73M2
GLUCOSE BLD-MCNC: 112 MG/DL (ref 70–125)
POTASSIUM BLD-SCNC: 4.5 MMOL/L (ref 3.5–5)
SODIUM SERPL-SCNC: 142 MMOL/L (ref 136–145)

## 2022-04-20 PROCEDURE — 80048 BASIC METABOLIC PNL TOTAL CA: CPT | Mod: ORL | Performed by: FAMILY MEDICINE

## 2022-04-20 NOTE — TELEPHONE ENCOUNTER
Updated start form was faxed to [facility], but the patient is still unsure about starting therapy.     Thank you,    Maria Luz Johnson [facility]  Specialty Pharmacy Clinic Liaison - CardiologyNeurologyMultiple Sclerosis  [facility] [address]  3[address] [place] [address]  Ph: [phone] Fax: [phone]  [email or web address]

## 2022-05-02 NOTE — TELEPHONE ENCOUNTER
Received Tier Exceptions request. Completed and faxed back to [facility] for review.         Thank you,    Maria Luz Johnson [facility]  Specialty Pharmacy Clinic Liaison - CardiologyNeurologyMultiple Sclerosis  [facility] [address] [place] [address]  Ph: [phone] Fax: [phone]  [email or web address]

## 2022-05-12 ENCOUNTER — HOSPITAL ENCOUNTER (OUTPATIENT)
Dept: OCCUPATIONAL THERAPY | Facility: REHABILITATION | Age: 87
Discharge: HOME | End: 2022-05-12
Attending: NURSE PRACTITIONER
Payer: MEDICARE

## 2022-05-12 DIAGNOSIS — G20.A1 PARKINSON'S DISEASE (H): ICD-10-CM

## 2022-05-12 DIAGNOSIS — F06.8 PSYCHOSIS DUE TO PARKINSON'S DISEASE (H): ICD-10-CM

## 2022-05-12 DIAGNOSIS — G20.A1 PSYCHOSIS DUE TO PARKINSON'S DISEASE (H): ICD-10-CM

## 2022-05-12 DIAGNOSIS — R44.1 VISUAL HALLUCINATIONS: ICD-10-CM

## 2022-05-12 PROCEDURE — 96125 COGNITIVE TEST BY HC PRO: CPT | Mod: GO | Performed by: OCCUPATIONAL THERAPIST

## 2022-05-12 NOTE — PLAN OF CARE
"Cognitive Performance Test    SUMMARY OF TEST:    The Cognitive Performance Test (CPT) is a standardized performance-based assessment to measure working memory/executive function processing capacities that underlie functional performance. Subtasks include common basic and instrumental activities of daily living (ADL/IADL) which are rated based on the manner in which patients respond to task demands of varying complexity. The total CPT score describes a level of functioning that indicates how information is processed, implications for functional activities, potential safety risks and a recommended level of supervision or assist based on cognitive function. The highest total score on this test is in the range of 5.6 to 5.8.    DATE OF TESTIN22    RESULTS OF TESTING:                                                                                         CPT Subtest Results    MEDBOX:  SHOP/GLOVES:  PHONE:    WASH:    TOAST:    TOTAL CPT SCORE:       Average CPT Score  4.8/5.6    INTERPRETATION OF TEST RESULTS:    Based on the Cognitive Performance Test, this patient scored at CPT Level 4.8.  See CPT Levels reference below.    Summary of functional cognitive status:   Medbox test:   Patient needed specific cues to correct the medicine with \"as needed\" directions. Patient made one error on the remainder of the medication set up (missed one pill in one slot for the twice daily medication) but was able to correct the error with a specific cue.  Shop test:  Patient had no difficulty with this task  Wash test:  Patient had no difficulty with this task  Toast test:  Patient had difficulty locating the available outlet that was located behind him. The work station was set up for him and he was able to complete the task without difficulty  Phone test: Patient was unable to alphabetize when using the phone book and needed the phone number given to him. Once he had the phone number, he " "was able to complete the task.    Factors affecting performance:  No additional problems noted    Recommendations:  Patient currently lives with his niece and nephew and is receiving help with some of his basic ADL's and all of his IADL's.  If patient were to move to an alternative living facility, recommend at least an assisted living setting due to family reported fluctuations in confusion. Family states that patient was having a \"good day\" today. Patient has had hallucinations and been known to step outside while appearing confused. A memory care setting would offer security for days that patient is more confused.   Assist for ADL/IADL:  Meal preparation, Cleaning, Laundry, Shopping, Finances, Driving, and Medication management  Supervision for ADL/IADL:  ADL  Supervision in living setting:  Daily checks                                                       TIME ADMINISTERING TEST: 45    TIME FOR INTERPRETATION AND PREPARATION OF REPORT: 20    TOTAL TIME: 65      CPT Levels Reference:    Patient's Average CPT Score:  4.8                                                                                                                                                  Individual scores range along a continuum as outlined below.  In addition to cognitive status, other factors may affect safety in a home environment.  Please refer to specific recommendations for this patient.    ___5.6-5.8  Normal functioning (absence of cognitive-functional disability).  Independent in managing personal affairs, monitors and directs own behavior.  Uses complex information to carry out daily activities with safety and accuracy.    Proficient with instrumental activities of daily living (IADL) and learning new activity.  Problems are anticipated, errors are avoided, and consequences of actions are considered.      ___5.0   Mild cognitive-functional disability; deficits in working memory and executive thought processes. Difficulty using " "complex information. Problems may be observed with recent memory, judgment, reasoning and planning ahead. May be impulsive or have difficulty anticipating consequences.  Safety:  May require assistance to plan ahead; or to manage complex medication schedules, appointments or finances.  Hazardous activities may need to be monitored or limited.  ADL:  Mild functional decline.  Able to complete basic self-care and routine household tasks.  May have difficulty with complex daily tasks such as reading, writing, meal preparation, shopping or driving.   Learns through hands on teaching. Self-centered behavior or difficulty considering the needs of others may be seen related to trouble seeing the  whole picture\". Can appear disorganized or uninhibited.    _X_4.5  Mild to moderate cognitive-functional disability. Significant deficits in working memory and executive thought processes. Judgment, reasoning and planning show obvious impairment.  Distractible with inability to shift attention/actions given competing stimuli.  Difficulty with problem solving and managing details. Complex daily tasks performed with inconsistency, difficulty, or error.     Safety:  Medications should be monitored, stove use may require supervision, and driving ability may be affected.  Impaired safety awareness with inability to anticipate potential problems.  May not recognize or respond to emergent situations. Requires frequent check-in support.   ADL:  Mild difficulty with simple everyday self-care tasks. Benefits from structured, routine activity.  Will likely need reminders to complete tasks outside of the routine. Requires assistance with planning and IADL tasks like shopping and finances. Learns concrete tasks through repetition, but performance may not generalize. Tends to be impulsive with poor insight. Self centered behavior or inability to consider the needs of others is common.    ___4.0  Moderate cognitive-functional disability; " abstract to concrete thought processes. Working memory and executive function impairments are obvious. Difficulty with planning and problem solving.  Behavior is goal-directed, but unable to follow multi-step directions, is easily distracted, and may not recognize mistakes.  Inability to anticipate hazards or understand precautions.  Safety:  Recommend 24-hour supervision for safety. Supervision needed for medication management and for hazardous activities. May not be able to follow a restricted diet. Can get lost in unfamiliar surroundings. Generally, persons functioning at level 4 should not be driving.   ADL:  Some decline in quality or frequency of ADL.  [place] enhanced by use of a routine, simple concrete directions, and caregiver set-up of needed items. Complex tasks such as money or home management typically requires assistance.  Relies heavily on vision to guide behavior; will ignore objects/hazards not in plain sight and can be distracted by irrelevant objects. Often has poor insight.  Able to carry out social conversation and may verbally  cover  for deficits leading caregivers to believe they are capable of functioning independently.       ___3.5  Moderate cognitive-functional disability; increased cues needed for task completion. Aware of concrete task steps but needs prompting or cues to initiate and complete simple tasks. Attention span is limited, simple directions may need to be repeated, and re-focus to a topic or task may be required.  Safety:  24-hour supervision required for safety and for assistance with daily tasks. Assistance required with medications, and access to medication should be limited. Meals, nutrition and dietary restrictions need to be monitored.  All hazardous activities should be restricted or supervised. Should not drive. Prone to wandering and can become lost.  ADL:  Moderate functional decline. Familiar tasks usually requires set-up of supplies and directions to complete  steps. May need objects handed to them for task initiation. Function best with a set schedule in familiar surroundings with familiar people. All complex tasks must be done by others. Vocabulary is diminished and speech often unfocused.

## 2022-06-13 ENCOUNTER — VIRTUAL VISIT (OUTPATIENT)
Dept: NEUROLOGY | Facility: CLINIC | Age: 87
End: 2022-06-13
Payer: MEDICARE

## 2022-06-13 DIAGNOSIS — G20.A1 PARKINSON'S DISEASE (H): ICD-10-CM

## 2022-06-13 DIAGNOSIS — R26.89 BALANCE PROBLEMS: Primary | ICD-10-CM

## 2022-06-13 DIAGNOSIS — R41.89 COGNITIVE IMPAIRMENT: ICD-10-CM

## 2022-06-13 PROBLEM — R20.9 SKIN SENSATION DISTURBANCE: Status: ACTIVE | Noted: 2022-06-13

## 2022-06-13 PROBLEM — M46.1 SACROILIITIS, NOT ELSEWHERE CLASSIFIED (H): Status: ACTIVE | Noted: 2022-06-13

## 2022-06-13 PROCEDURE — 99214 OFFICE O/P EST MOD 30 MIN: CPT | Mod: 95 | Performed by: NURSE PRACTITIONER

## 2022-06-13 RX ORDER — CARBIDOPA AND LEVODOPA 25; 100 MG/1; MG/1
TABLET ORAL
Qty: 270 TABLET | Refills: 1 | Status: SHIPPED | OUTPATIENT
Start: 2022-06-13 | End: 2022-01-01

## 2022-06-13 ASSESSMENT — MOVEMENT DISORDERS SOCIETY - UNIFIED PARKINSONS DISEASE RATING SCALE (MDS-UPDRS)
EATING_TASKS: NORMAL: NOT AT ALL (NO PROBLEMS).
GETTING_OUT_OF_BED_CAR_DEEP_CHAIR: MILD: I NEED MORE THAN ONE TRY TO GET UP OR NEED OCCASIONAL HELP.
FREEZING: SLIGHTLY: I BRIEFLY FREEZE, BUT I CAN EASILY START WALKING AGAIN. I DO NOT NEED HELP FROM SOMEONE ELSE OR A WALKING AID (CANE OR WALKER) BECAUSE OF FREEZING.
HYGIENE: NORMAL: NOT AT ALL (NO PROBLEMS).
SALIVA_AND_DROOLING: NORMAL: NOT AT ALL (NO PROBLEMS).
DRESSING: NORMAL: NOT AT ALL (NO PROBLEMS).
SPEECH: NORMAL: NOT AT ALL (NO PROBLEMS).
TREMOR: SLIGHT: SHAKING OR TREMOR OCCURS BUT DOES NOT CAUSE PROBLEMS WITH ANY ACTIVITIES.
TOTAL_SCORE: 7
HOBBIES_AND_OTHER_ACTIVITIES: SLIGHT: I AM A BIT SLOW BUT DO THESE ACTIVITIES EASILY.
HANDWRITING: SLIGHT: MY WRITING IS SLOW, CLUMSY OR UNEVEN, BUT ALL WORDS ARE CLEAR.
WALKING_AND_BALANCE: SLIGHT: I AM SLIGHTLY SLOW OR MAY DRAG A LEG.  I NEVER USE A WALKING AID.
CHEWING_AND_SWALLOWING: NORMAL: NO PROBLEMS.
TURNING_IN_BED: NORMAL: NOT AT ALL (NO PROBLEMS).

## 2022-06-13 NOTE — PROGRESS NOTES
Juan is an 86 year old who is being evaluated via a billable video visit.      How would you like to obtain your AVS? ClearFit  If the video visit is dropped, the invitation should be resent by: Send to e-mail at: [email or web address]  Will anyone else be joining your video visit? No      Video-Visit Details    Type of service:  Video Visit    Start: 2022 01:49 pm  Stop: 2022 02:01 pm    Originating Location (pt. Location): Home    Distant Location (provider location):  [facility] NEUROLOGY [facility]     Platform used for Video Visit: AmWell    ------------------------------------------------------------------------------------------------------------------------------------------------------------------------------------------------------------------------      ASSESSMENT:    1)  Parkinson's Disease:  Some improvement in tremors. He has balance problems.     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 No safety concerns.      PLAN:    __  Stay on the same antiparkinsonian medications. I have refilled a 90-day Rx.     __  Referral to Home Care for PT to work on gait and balance.     __  Virtual visit in 6 months. You may return sooner as needed.    __  Schedulers will call you to schedule your next visit.         MOVEMENT DISORDERS CLINIC           PATIENT: Juan RITTER Nay    : 1935    DATE: 2022    REASON FOR VISIT: Parkinson's disease (PD) and cognitive impairment follow up.    HPI: Mr. Juan Tee is an 86 year old who is seen via video visit for a follow up visit.      During today's video visit pt and his niece were present.     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 Significant deficits in working memory and executive thought processes. Judgment, reasoning and  planning show obvious impairment.  Distractible with inability to shift attention/actions given competing stimuli.  Difficulty with problem solving and managing details. Complex daily tasks performed with inconsistency, difficulty, or error.                   Safety:  Medications should be monitored, stove use may require supervision, and driving ability may be affected.  Impaired safety awareness with inability to anticipate potential problems.  May not recognize or respond to emergent situations. Requires frequent check-in support.   ADL:  Mild difficulty with simple everyday self-care tasks. Benefits from structured, routine activity.  Will likely need reminders to complete tasks outside of the routine. Requires assistance with planning and IADL tasks like shopping and finances. Learns concrete tasks through repetition, but performance may not generalize. Tends to be impulsive with poor insight. Self centered behavior or inability to consider the needs of others is common.      His niece provided most of the history.  She reports that he is doing about the same. The hallucinations has been good. They only occur when he gets sick and subsides.  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  His niece reports no safety concerns at this time. He does have forgetfulness.    Tremors have improved on Sinemet.  He reports no side effects from the Sinemet. He gets up around 9 am and goes to bed between 10:30 - 11 pm.  His niece asked prescription refill for 90 days.    PD Medications 10 am 2 pm 8-9 pm   Sinemet 25/100 mg  1 1 1     He has drooling.  No chocking.    He is shaky and unsteady when he stands.  No falls.  He has not done physical therapy.  He is interested to do PT if it helps his mobility.    MEDICATIONS:   Outpatient Medications Marked as Taking for the 6/13/22 encounter (Virtual Visit) with Antonella Kaminski APRN CNP    Medication Sig     aspirin 81 mg chewable tablet Take 81 mg by mouth every evening      atorvastatin (LIPITOR) 10 MG tablet Take 10 mg by mouth every evening     azaTHIOprine (IMURAN) 50 mg tablet Take 25 mg by mouth daily      CALCIUM ORAL [CALCIUM ORAL] Take 1 tablet by mouth 2 (two) times a day.     carbidopa-levodopa (SINEMET)  MG tablet Take 1 tablet by mouth 3 x per day 4 - 5 hours apart.     cholecalciferol, vitamin D3, 5,000 unit capsule [CHOLECALCIFEROL, VITAMIN D3, 5,000 UNIT CAPSULE] Take 5,000 Units by mouth daily.     DOCOSAHEXANOIC ACID/EPA (FISH OIL ORAL) [DOCOSAHEXANOIC ACID/EPA (FISH OIL ORAL)] Take 1,200 mg by mouth daily.     FOLIC ACID/MULTIVIT-MIN/LUTEIN (CENTRUM SILVER ORAL) [FOLIC ACID/MULTIVIT-MIN/LUTEIN (CENTRUM SILVER ORAL)] Take 1 tablet by mouth daily.     furosemide (LASIX) 20 MG tablet Take 1 tablet (20 mg) by mouth daily     metoprolol succinate ER (TOPROL-XL) 25 MG 24 hr tablet Take 25 mg by mouth every evening     pimavanserin Tartrate (NUPLAZID) 34 MG capsule Take 1 capsule (34 mg) by mouth daily     VIT C/E/ZN/COPPR/LUTEIN/ZEAXAN (PRESERVISION AREDS 2 ORAL) [VIT C/E/ZN/COPPR/LUTEIN/ZEAXAN (PRESERVISION AREDS 2 ORAL)] Take 1 capsule by mouth 2 (two) times a day.       I spent 35 minutes caring for the patient today including video time, reviewing records, answering questions, refilling/ordering medication, placing referral, and documentation.    Nichol Kaminski, TRINITY,  CNP  [facility]

## 2022-06-13 NOTE — PATIENT INSTRUCTIONS
Dear . Juan RITTER Arvineddierose,    Thank you for coming today.  During your visit, we have discussed the following:     __  Stay on the same antiparkinsonian medications. I have refilled a 90-day Rx.     __  Referral to Home Care for PT to work on gait and balance.     __  Virtual visit in 6 months. You may return sooner as needed.    __  Schedulers will call you to schedule your next visit.     For questions, you may send us a [employer]PI [employer]) message or call [phone]    Fax number: [phone]    TRINITY Ch, CNP  [facility]

## 2022-06-13 NOTE — Clinical Note
2022       RE: Juan Tee  [address] [address]     Dear Colleague,    Thank you for referring your patient, Juan Tee, to the [facility] NEUROLOGY [facility]. Please see a copy of my visit note below.    Juan is a 86 year old who is being evaluated via a billable video visit.      How would you like to obtain your AVS? MyChart  If the video visit is dropped, the invitation should be resent by: Send to e-mail at: [email or web address]  Will anyone else be joining your video visit? No  {If patient encounters technical issues they should call [phone] :[phone]}    Video-Visit Details    Type of service:  Video Visit    Start: 2022 01:49 pm  Stop: 2022 02:01 pm    Originating Location (pt. Location): Home    Distant Location (provider location):  [facility] NEUROLOGY [facility]     Platform used for Video Visit: Alexei    ------------------------------------------------------------------------------------------------------------------------------------------------------------------------------------------------------------------------      ASSESSMENT:    1)  Parkinson's Disease:  Some improvement in tremors. He has balance problems.     2)  ***:      3)  ***:      4)  ***:       PLAN:    __  Stay on the same antiparkinsonian medications. I have refilled a 90-day Rx.     __  Referral to Home Care for PT to work on gait and balance.     __  Virtual visit in 6 months. You may return sooner as needed.          MOVEMENT DISORDERS CLINIC           PATIENT: Juan Tee    : 1935    DATE: 2022    REASON FOR VISIT: *** follow up.    HPI: Mr. Juan Tee is a 86 year old who is seen via video visit for a follow up visit.      During today's video visit pt and *** were present.     Records Reviewed:  ***    He is doing about the  same.  The hallucinations has been good.  They only occur when he is     Tremors have imporved.    Drooring.  No chocking.    He is shaky when he stands. NO     Home PT       PD Medications 10 am 2 pm 8-9 pm *** *** *** ***   Sinemet 25/100 mg                       He gets up around 9 am and goes to bed between 10:30 - 11 pm.    MEDICATIONS:   Outpatient Medications Marked as Taking for the 6/13/22 encounter (Virtual Visit) with Antonella Kaminski APRN CNP   Medication Sig     aspirin 81 mg chewable tablet Take 81 mg by mouth every evening      atorvastatin (LIPITOR) 10 MG tablet Take 10 mg by mouth every evening     azaTHIOprine (IMURAN) 50 mg tablet Take 25 mg by mouth daily      CALCIUM ORAL [CALCIUM ORAL] Take 1 tablet by mouth 2 (two) times a day.     carbidopa-levodopa (SINEMET)  MG tablet Take 1 tablet in the morning for 1 week; then 1 tablet twice daily for 1 week; then 1 tablet 3 x per day. Take pills 1 hour before meals.  If nauseated, may take it with toast, fruits, or crackers. Take first dose shortly after you get up then 4 - 5 hours apart. (Patient taking differently: Take 1 tablet in the morning for 1 week; then 1 tablet twice daily for 1 week; then 1 tablet 3 x per day. Take pills 1 hour before meals.  If nauseated, may take it with toast, fruits, or crackers. Take first dose shortly after you get up then 4 - 5 hours apart.)     cholecalciferol, vitamin D3, 5,000 unit capsule [CHOLECALCIFEROL, VITAMIN D3, 5,000 UNIT CAPSULE] Take 5,000 Units by mouth daily.     DOCOSAHEXANOIC ACID/EPA (FISH OIL ORAL) [DOCOSAHEXANOIC ACID/EPA (FISH OIL ORAL)] Take 1,200 mg by mouth daily.     FOLIC ACID/MULTIVIT-MIN/LUTEIN (CENTRUM SILVER ORAL) [FOLIC ACID/MULTIVIT-MIN/LUTEIN (CENTRUM SILVER ORAL)] Take 1 tablet by mouth daily.     furosemide (LASIX) 20 MG tablet Take 1 tablet (20 mg) by mouth daily     metoprolol succinate ER (TOPROL-XL) 25 MG 24 hr tablet Take 25 mg by mouth every evening     pimavanserin  Tartrate (NUPLAZID) 34 MG capsule Take 1 capsule (34 mg) by mouth daily     VIT C/E/ZN/COPPR/LUTEIN/ZEAXAN (PRESERVISION AREDS 2 ORAL) [VIT C/E/ZN/COPPR/LUTEIN/ZEAXAN (PRESERVISION AREDS 2 ORAL)] Take 1 capsule by mouth 2 (two) times a day.         EXAM:       I spent *** minutes caring for the patient today including video time, reviewing records, answering questions, examining***, refilling/ordering medication, *** and documentation.    TRINITY Ch,  CNP  [facility] [[email or web address]] Neurology Clinic         Again, thank you for allowing me to participate in the care of your patient.      Sincerely,    TRINITY Almeida CNP

## 2022-06-14 ENCOUNTER — TELEPHONE (OUTPATIENT)
Dept: NEUROLOGY | Facility: CLINIC | Age: 87
End: 2022-06-14
Payer: MEDICARE

## 2022-06-14 NOTE — TELEPHONE ENCOUNTER
[facility] Call Center    Phone Message    May a detailed message be left on voicemail: yes     Reason for Call: Other: Delay in PT orders to [facility] until 6/21. Please call Liset to give verbal orders at [phone] ext [phone].    Action Taken: Message routed to:  [facility] neurology    Travel Screening: Not Applicable

## 2022-06-27 NOTE — ADDENDUM NOTE
Encounter addended by: Tasha Dietrich, MARIAM on: 6/27/2022 11:36 AM   Actions taken: Episode resolved, Clinical Note Signed

## 2022-06-27 NOTE — PROGRESS NOTES
[facility]    Outpatient Occupational Therapy Discharge Note  Patient: Juan Tee  : 1935    Beginning/End Dates of Reporting Period:  22    Referring Provider: Dr. Antonella Kaminski    Therapy Diagnosis: parkinson's, memory changes, decreased ADL and IADL function    Goals: Completed CPT. Goal met.    Plan:  Discharge from therapy.

## 2022-12-19 NOTE — PHARMACY-ADMISSION MEDICATION HISTORY
Pharmacy Note - Admission Medication History    Pertinent Provider Information: None     ______________________________________________________________________    Prior To Admission (PTA) med list completed and updated in EMR.       PTA Med List   Medication Sig Note Last Dose     aspirin 81 mg chewable tablet Take 81 mg by mouth every evening   12/18/2022     atorvastatin (LIPITOR) 10 MG tablet Take 10 mg by mouth every evening  12/18/2022     azaTHIOprine (IMURAN) 50 mg tablet Take 25 mg by mouth daily   12/19/2022     calcium carbonate (OS-KACIE) 1500 (600 Ca) MG tablet Take 600 mg by mouth daily  12/19/2022     carbidopa-levodopa (SINEMET)  MG tablet Take 1 tablet by mouth 3 x per day 4 - 5 hours apart.  12/19/2022 x1     cholecalciferol, vitamin D3, 5,000 unit capsule [CHOLECALCIFEROL, VITAMIN D3, 5,000 UNIT CAPSULE] Take 5,000 Units by mouth daily.  12/19/2022     DOCOSAHEXANOIC ACID/EPA (FISH OIL ORAL) [DOCOSAHEXANOIC ACID/EPA (FISH OIL ORAL)] Take 1,200 mg by mouth daily.  12/19/2022     FOLIC ACID/MULTIVIT-MIN/LUTEIN (CENTRUM SILVER ORAL) [FOLIC ACID/MULTIVIT-MIN/LUTEIN (CENTRUM SILVER ORAL)] Take 1 tablet by mouth daily.  12/19/2022     furosemide (LASIX) 20 MG tablet Take 1 tablet (20 mg) by mouth daily (Patient taking differently: Take 10 mg by mouth daily) 12/19/2022: . 12/19/2022     melatonin 5 MG tablet Take 5 mg by mouth At Bedtime  12/18/2022     metoprolol succinate ER (TOPROL-XL) 25 MG 24 hr tablet Take 25 mg by mouth every evening  12/18/2022     VIT C/E/ZN/COPPR/LUTEIN/ZEAXAN (PRESERVISION AREDS 2 ORAL) [VIT C/E/ZN/COPPR/LUTEIN/ZEAXAN (PRESERVISION AREDS 2 ORAL)] Take 1 capsule by mouth 2 (two) times a day.  12/19/2022 x1       Information source(s): Family member and CareEverywhere/SureScripts  Method of interview communication: in-person and phone    Summary of Changes to PTA Med List  New: None  Discontinued: spironolactone, pimavanserin,   Changed: azathioprine decreased to 25 mg daily,  furosemide decreased to 10mg daily, melatonin increased to 5mg hs    Patient was asked about OTC/herbal products specifically.  PTA med list reflects this.    In the past week, patient estimated taking medication this percent of the time:  greater than 90%.    Allergies were reviewed, assessed, and updated with the patient.      Patient does not use any multi-dose medications prior to admission.    The information provided in this note is only as accurate as the sources available at the time of the update(s).    Thank you for the opportunity to participate in the care of this patient.    Ashlee Magaña [facility]  12/19/2022 5:45 PM

## 2022-12-19 NOTE — H&P
"[facility]    History and Physical - Hospitalist Service       Date of Admission:  12/19/2022    Assessment & Plan      Juan Tee is a 87 year old male with history of hypertension, hyperlipidemia, coronary artery disease, Parkinson's disease/Lewy body dementia, admitted on 12/19/2022 for fall.     Fall initial encounter  Right hip ecchymoses  Fall precautions  Order cold and heat application as needed  Order acetaminophen 975 mg every 8 hours  Order lidocaine 5% topical ointment 4 times daily  PT and OT assessment tomorrow  Care management consultation for dispo position plan    Dementia  Parkinson disease  Order PTA medication: Sinemet  mg 1 tab 3 times daily    Hypertension  Hyperlipidemia  Coronary artery disease without angina pectoris.   Resume PTA medication: Atorvastatin 10 mg nightly, metoprolol 25 mg nightly, furosemide 20 mg daily as prescribed  PPx metabolic profile in a.m.    Chronic lower extremity edema, lymphedema  Lower extremity wound  Order therapy consult for lymphedema wraps.  Wound care consultation  Increase furosemide 20 mg daily       Diet: Regular Diet Adult  DVT Prophylaxis: Pneumatic Compression Devices  Larkin Catheter: Not present  Central Lines: None  Cardiac Monitoring: None  Code Status: Full Code    Clinically Significant Risk Factors Present on Admission                       # Overweight: Estimated body mass index is 26.57 kg/m  as calculated from the following:    Height as of this encounter: 1.6 m (5' 3\").    Weight as of this encounter: 68 kg (150 lb).           Disposition Plan      Expected Discharge Date: 12/20/2022                The patient's care was discussed with the Attending Physician, Dr. Hirsch, Patient and Patient's Family.    Edmond Davis,   Hospitalist Service  [facility]  ______________________________________________________________________    Chief Complaint   Fall at " home.    History is obtained from the patient  And family.    History of Present Illness   Juan Tee is a 87 year old male with history of hypertension, hyperlipidemia, coronary artery disease, Parkinson's disease/Lewy body dementia, admitted on 2022 for fall.  Patient lives with his niece in her home.  Patient's niece has a camera in her room so that they can monitor Juan.  Apparently on Friday patient was up in his room and had a fall landing on his right side.  He did hit his right hip and had.  Patient reportedly was sitting and waiting for them to arrive home.  Patient was ambulating normally until  when he stopped bearing weight on his right leg.  Patient was admitted for pain management and further evaluation.  Patient will likely require placement at memory care facility.    Review of Systems    The 10 point Review of Systems is negative other than noted in the HPI.    Past Medical History    I have reviewed this patient's medical history and updated it with pertinent information if needed.   Past Medical History:   Diagnosis Date     History of skin cancer      HTN (hypertension)      Infection due to 2019 novel coronavirus        Past Surgical History   I have reviewed this patient's surgical history and updated it with pertinent information if needed.  Past Surgical History:   Procedure Laterality Date     HC OPEN RX PATELLA FX      Left knee Treatment Of Knee Fracture Patellar, Open     HERNIA REPAIR         Social History   I have reviewed this patient's social history and updated it with pertinent information if needed.  Social History     Tobacco Use     Smoking status: Former     Packs/day: 1.00     Years: 8.00     Pack years: 8.00     Types: Cigarettes     Quit date: 1962     Years since quittin.3     Smokeless tobacco: Never   Substance Use Topics     Alcohol use: Yes     Comment: Alcoholic Drinks/day: rare     Drug use: No       Family History   I have  reviewed this patient's family history and updated it with pertinent information if needed.  Family History   Problem Relation Age of Onset     Kidney failure Mother 54     Cerebrovascular Disease Father 68     Sudden Death Father 72         in the doctor's office     Rheumatic fever Sister      No Known Problems Sister      No Known Problems Sister      No Known Problems Sister      No Known Problems Sister      Congenital heart disease Brother 0         at 26     Acute Myocardial Infarction Brother 68     Coronary Artery Disease Brother      CABG Brother 72       Prior to Admission Medications   Prior to Admission Medications   Prescriptions Last Dose Informant Patient Reported? Taking?   DOCOSAHEXANOIC ACID/EPA (FISH OIL ORAL) 2022  Yes Yes   Sig: [DOCOSAHEXANOIC ACID/EPA (FISH OIL ORAL)] Take 1,200 mg by mouth daily.   FOLIC ACID/MULTIVIT-MIN/LUTEIN (CENTRUM SILVER ORAL) 2022  Yes Yes   Sig: [FOLIC ACID/MULTIVIT-MIN/LUTEIN (CENTRUM SILVER ORAL)] Take 1 tablet by mouth daily.   VIT C/E/ZN/COPPR/LUTEIN/ZEAXAN (PRESERVISION AREDS 2 ORAL) 12/19/2022 x1  Yes Yes   Sig: [VIT C/E/ZN/COPPR/LUTEIN/ZEAXAN (PRESERVISION AREDS 2 ORAL)] Take 1 capsule by mouth 2 (two) times a day.   aspirin 81 mg chewable tablet 2022  Yes Yes   Sig: Take 81 mg by mouth every evening    atorvastatin (LIPITOR) 10 MG tablet 2022  Yes Yes   Sig: Take 10 mg by mouth every evening   azaTHIOprine (IMURAN) 50 mg tablet 2022  Yes Yes   Sig: Take 25 mg by mouth daily    calcium carbonate (OS-KACIE) 1500 (600 Ca) MG tablet 2022  Yes Yes   Sig: Take 600 mg by mouth daily   carbidopa-levodopa (SINEMET)  MG tablet 12/19/2022 x1  No Yes   Sig: Take 1 tablet by mouth 3 x per day 4 - 5 hours apart.   cholecalciferol, vitamin D3, 5,000 unit capsule 2022  Yes Yes   Sig: [CHOLECALCIFEROL, VITAMIN D3, 5,000 UNIT CAPSULE] Take 5,000 Units by mouth daily.   furosemide (LASIX) 20 MG tablet 2022  No Yes    Sig: Take 1 tablet (20 mg) by mouth daily   Patient taking differently: Take 10 mg by mouth daily   melatonin 5 MG tablet 12/18/2022  Yes Yes   Sig: Take 5 mg by mouth At Bedtime   metoprolol succinate ER (TOPROL-XL) 25 MG 24 hr tablet 12/18/2022  Yes Yes   Sig: Take 25 mg by mouth every evening      Facility-Administered Medications: None     Allergies   No Known Allergies    Physical Exam   Vital Signs: Temp: 98.1  F (36.7  C) Temp src: Oral BP: 106/68 Pulse: 75   Resp: 18 SpO2: 96 % O2 Device: None (Room air)    Weight: 150 lbs 0 oz    Constitutional: awake, alert, cooperative, no apparent distress, and appears stated age  Eyes: Lids and lashes normal, pupils equal, round and reactive to light, extra ocular muscles intact, sclera clear, conjunctiva normal  Respiratory: No increased work of breathing, good air exchange, clear to auscultation bilaterally, no crackles or wheezing  Cardiovascular: Normal apical impulse, regular rate and rhythm, normal S1 and S2, no S3 or S4, and no murmur noted  GI: No scars, normal bowel sounds, soft, non-distended, non-tender, no masses palpated, no hepatosplenomegally  Skin: normal skin color, texture, turgor and ecchymosis on right hip(s)  Musculoskeletal: Right hip tenderness when palpating area around greater trochanter, ecchymoses noted over right hip and right lower abdomen.  Chronic appearing 3+ pitting edema bilateral lower extremities.  Neurologic: Mental Status Exam:  Level of Alertness:   awake  Orientation:   person  Sensory:  Sensory intact    Data   Data reviewed today: I reviewed all medications, new labs and imaging results over the last 24 hours.     Recent Labs   Lab 12/19/22  1629   WBC 5.7   HGB 13.3            POTASSIUM 4.3   CHLORIDE 104   CO2 32*   BUN 26   CR 1.21   ANIONGAP 5   KACIE 9.5        Recent Results (from the past 24 hour(s))   Head CT w/o contrast    Narrative    EXAM: CT HEAD W/O CONTRAST, CT CERVICAL SPINE W/O  CONTRAST  LOCATION: [facility]  DATE/TIME: 12/19/2022 4:12 PM    INDICATION: fall, head injury  COMPARISON: CT head and cervical spine 01/12/2022  TECHNIQUE:   1) Routine CT Head without IV contrast. Multiplanar reformats. Dose reduction techniques were used.  2) Routine CT Cervical Spine without IV contrast. Multiplanar reformats. Dose reduction techniques were used.    FINDINGS:   HEAD CT:   INTRACRANIAL CONTENTS: No intracranial hemorrhage, extraaxial collection, or mass effect.  No CT evidence of acute infarct. Mild presumed chronic small vessel ischemic changes. Mild generalized volume loss. No hydrocephalus.     VISUALIZED ORBITS/SINUSES/MASTOIDS: No intraorbital abnormality. No paranasal sinus mucosal disease. No middle ear or mastoid effusion.    BONES/SOFT TISSUES: No scalp hematoma. No skull fracture.    CERVICAL SPINE CT:   VERTEBRA: Straightened cervical lordosis. 2 mm degenerative type anterolisthesis at C4-C5 and C7-T1. Unchanged vertebral body heights. No fracture or posttraumatic subluxation.     CANAL/FORAMINA: Diffuse cervical spondylosis with mild diffuse loss of disc height and multilevel endplate spurring including central disc osteophyte complex at C3-C4 and left central disc osteophyte complex at C6-C7 similar to the previous CT.   Multilevel uncovertebral spurring and facet arthropathy. At C3-C4, moderate spinal canal stenosis with moderate to severe right and moderate left neural foraminal stenosis. At C6-C7 and C7-T1, mild spinal canal stenosis. At C6-C7, moderate to severe   bilateral neural foraminal stenosis.    PARASPINAL: Prevertebral soft tissues within normal limits for thickness. Atherosclerotic calcifications the carotid bifurcations. Heterogeneous presumably multinodular thyroid gland without dominant nodule. Visualized lung fields are clear.      Impression    IMPRESSION:  HEAD CT:  1.  No CT evidence for acute intracranial process.  2.  Brain atrophy  and presumed chronic microvascular ischemic changes as above.    CERVICAL SPINE CT:  1.  No CT evidence for acute fracture or post traumatic subluxation.  2.  Multilevel cervical spondylosis similar to findings on the previous exam as above.     Cervical spine CT w/o contrast    Narrative    EXAM: CT HEAD W/O CONTRAST, CT CERVICAL SPINE W/O CONTRAST  LOCATION: [facility]  DATE/TIME: 12/19/2022 4:12 PM    INDICATION: fall, head injury  COMPARISON: CT head and cervical spine 01/12/2022  TECHNIQUE:   1) Routine CT Head without IV contrast. Multiplanar reformats. Dose reduction techniques were used.  2) Routine CT Cervical Spine without IV contrast. Multiplanar reformats. Dose reduction techniques were used.    FINDINGS:   HEAD CT:   INTRACRANIAL CONTENTS: No intracranial hemorrhage, extraaxial collection, or mass effect.  No CT evidence of acute infarct. Mild presumed chronic small vessel ischemic changes. Mild generalized volume loss. No hydrocephalus.     VISUALIZED ORBITS/SINUSES/MASTOIDS: No intraorbital abnormality. No paranasal sinus mucosal disease. No middle ear or mastoid effusion.    BONES/SOFT TISSUES: No scalp hematoma. No skull fracture.    CERVICAL SPINE CT:   VERTEBRA: Straightened cervical lordosis. 2 mm degenerative type anterolisthesis at C4-C5 and C7-T1. Unchanged vertebral body heights. No fracture or posttraumatic subluxation.     CANAL/FORAMINA: Diffuse cervical spondylosis with mild diffuse loss of disc height and multilevel endplate spurring including central disc osteophyte complex at C3-C4 and left central disc osteophyte complex at C6-C7 similar to the previous CT.   Multilevel uncovertebral spurring and facet arthropathy. At C3-C4, moderate spinal canal stenosis with moderate to severe right and moderate left neural foraminal stenosis. At C6-C7 and C7-T1, mild spinal canal stenosis. At C6-C7, moderate to severe   bilateral neural foraminal stenosis.    PARASPINAL:  Prevertebral soft tissues within normal limits for thickness. Atherosclerotic calcifications the carotid bifurcations. Heterogeneous presumably multinodular thyroid gland without dominant nodule. Visualized lung fields are clear.      Impression    IMPRESSION:  HEAD CT:  1.  No CT evidence for acute intracranial process.  2.  Brain atrophy and presumed chronic microvascular ischemic changes as above.    CERVICAL SPINE CT:  1.  No CT evidence for acute fracture or post traumatic subluxation.  2.  Multilevel cervical spondylosis similar to findings on the previous exam as above.     Chest XR,  PA & LAT    Narrative    EXAM: XR CHEST 2 VIEWS  LOCATION: [facility]  DATE/TIME: 12/19/2022 4:22 PM    INDICATION: Fall. Chest pain.  COMPARISON: 01/12/2022.      Impression    IMPRESSION: Mild bibasilar opacities, some of which are reticular, suggesting atelectasis and fibrosis. Cannot exclude minimal pleural fluid. No pneumothorax. Stable upper normal heart size. No overt acute fracture by radiography. Bony demineralization   and degenerative changes.     XR Pelvis and Hip Right 2 Views    Narrative    EXAM: XR PELVIS AND HIP RIGHT 2 VIEWS  LOCATION: [facility]  DATE/TIME: 12/19/2022 4:23 PM    INDICATION: right hip pain, fall  COMPARISON: None.      Impression    IMPRESSION: No acute fracture or dislocation. 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 Degenerative changes in the lumbar spine. There may be partial ankylosis of the SI joints superiorly.    There is a collection of small rounded calculation projecting over the central pelvis which are nonspecific, bladder stones are possibility given the location.

## 2022-12-19 NOTE — ED PROVIDER NOTES
EMERGENCY DEPARTMENT ENCOUNTER      NAME: Juan Tee  AGE: 87 year old male  YOB: 1935  MRN: [id number]  EVALUATION DATE & TIME: 12/19/2022  3:24 PM    PCP: Keith Steward    ED PROVIDER: Ghada Hirsch MD      Chief Complaint   Patient presents with     Hip Pain     Fall         FINAL IMPRESSION:  No diagnosis found.      ED COURSE & MEDICAL DECISION MAKING:    Pertinent Labs & Imaging studies reviewed. (See chart for details)  3:37 PM I met the patient and performed my initial interview and exam.   5:44 PM I spoke with Dr. Davis, hospitalist, who accepts the patient for admission.    87 year old male presents to the Emergency Department for evaluation after a fall on Friday.  The patient has a history of dementia and lives with his niece who is at the bedside.  They have a camera in his room to be able to monitor him and they saw on the camera that he try to get up from his chair, and lost his balance and fell to the right side, hitting his right hip and his head.  He is normally not able to get up after he falls, he subsequently sat and waited for them to arrive home which was about an hour and a half later.  He was ambulatory until today when he reports that he stopped bearing weight on his right lower extremity.  He does have a cane but does not use it when he is ambulating.  She reports that over the last couple months he has had a decline, he normally is able to get around his room and does come out of his room to eat, but has been less verbal than prior.  He is on aspirin 81 mg daily, not on anticoagulation.  On evaluation, he has ecchymosis of the right hip, he is able to follow my commands and is able to answer some questions.  He does have lymphedema in his lower extremities.  CT head and C-spine are unremarkable.  Urinalysis with no signs of infection.  Laboratory studies are within normal limits.  X-ray of his right hip with no acute fracture seen.  Given that  the patient has had a decline, is unable to bear weight, he will be admitted for pain management, further evaluation of right hip pain and likely will require placement.    At the conclusion of the encounter I discussed the results of all of the tests and the disposition. The questions were answered. The patient or family acknowledged understanding and was agreeable with the care plan.       Medical Decision Making    History:    Supplemental history from: Family Member/Significant Other    External Record(s) reviewed: Documented in HPI, if applicable.    Work Up:    Chart documentation includes differential considered and any EKGs or imaging interpreted by provider.    In additional to work up documented, I considered the following work up: See chart documentation, if applicable.    External consultation:    Discussion of management with another provider: See chart documentation, if applicable    Complicating factors:    Care impacted by chronic illness: Dementia    Care affected by social determinants of health: N/A    Disposition considerations: Admit.      MEDICATIONS GIVEN IN THE EMERGENCY:  Medications - No data to display    NEW PRESCRIPTIONS STARTED AT TODAY'S ER VISIT  New Prescriptions    No medications on file          =================================================================    [facility]    Patient information was obtained from: patient and his niece (who is his power of  and guardian)    Use of : N/A         Juan Tee is a 87 year old male with a pertinent history of Parkinson's disease with Lewy body dementia, ischemic heart disease, CAD, and HTN who presents to this ED by private car for evaluation of hip pain after fall.    The patient arrives with his niece, who lives with the patient and is his guardian/power of . His niece states that the patient was home alone Friday evening 12/16 when he lost his balance while moving from his chair to his bed. He fell  "to his right and landed on his hip. The patient has a camera in his room for safety purposes, and his niece has the recording of his fall. He was unable to get up, but was able to sit up and seemed ok. Footage shows that he did hit his head. The patient's niece states the patient seemed fine when she got home that night and found him on the ground. She says he denied any pain at that time. On Saturday 12/17, she noticed the patient had a bruise to his right hip. He was still able to ambulate until this morning 12/19, when he began complaining of pain in his right hip and was unable to ambulate. Niece denies that the patient is on any blood thinners, though states he takes 1 baby aspirin daily. She denies that the patient has had any recent illness. Denies he has had any fevers, chills, sweats, cough, congestion, urinary problems, or any other complaints at this time. She notes he has a tremor at baseline. The patient's niece states the patient has been \"out of it\" for ~1 month due to his PD and dementia, and they are hoping to find a residential assisted living to move to patient to after [place]. The patient has a cane but niece states he does not ever use it.    At present, the patient endorses pain in his right hip \"below the bruise\". He denies any back pain or headache.    SHx: The patient is a prior smoker, has not smoked in several decades.      REVIEW OF SYSTEMS   Review of Systems   Constitutional: Negative for chills, diaphoresis, fatigue and fever.   HENT: Negative for congestion and rhinorrhea.    Respiratory: Negative for cough.    Gastrointestinal: Negative for nausea and vomiting.   Genitourinary: Negative for decreased urine volume, difficulty urinating, frequency and hematuria.   Musculoskeletal: Positive for arthralgias (right hip pain with some bruising). Negative for back pain.   Skin: Negative for rash and wound.   Neurological: Positive for tremors (not new). Negative for syncope and " headaches.   Psychiatric/Behavioral: Positive for confusion (not new).   All other systems reviewed and are negative.       PAST MEDICAL HISTORY:  Past Medical History:   Diagnosis Date     History of skin cancer      HTN (hypertension)      Infection due to 2019 novel coronavirus        PAST SURGICAL HISTORY:  Past Surgical History:   Procedure Laterality Date     HC OPEN RX PATELLA FX      Left knee Treatment Of Knee Fracture Patellar, Open     HERNIA REPAIR             CURRENT MEDICATIONS:    aspirin 81 mg chewable tablet  atorvastatin (LIPITOR) 10 MG tablet  azaTHIOprine (IMURAN) 50 mg tablet  calcium carbonate (OS-KACIE) 1500 (600 Ca) MG tablet  carbidopa-levodopa (SINEMET)  MG tablet  cholecalciferol, vitamin D3, 5,000 unit capsule  DOCOSAHEXANOIC ACID/EPA (FISH OIL ORAL)  FOLIC ACID/MULTIVIT-MIN/LUTEIN (CENTRUM SILVER ORAL)  furosemide (LASIX) 20 MG tablet  melatonin 5 MG tablet  metoprolol succinate ER (TOPROL-XL) 25 MG 24 hr tablet  VIT C/E/ZN/COPPR/LUTEIN/ZEAXAN (PRESERVISION AREDS 2 ORAL)        ALLERGIES:  No Known Allergies    FAMILY HISTORY:  Family History   Problem Relation Age of Onset     Kidney failure Mother 54     Cerebrovascular Disease Father 68     Sudden Death Father 72         in the doctor's office     Rheumatic fever Sister      No Known Problems Sister      No Known Problems Sister      No Known Problems Sister      No Known Problems Sister      Congenital heart disease Brother 0         at 26     Acute Myocardial Infarction Brother 68     Coronary Artery Disease Brother      CABG Brother 72       SOCIAL HISTORY:   Social History     Socioeconomic History     Marital status: Single     Number of children: 0     Years of education: 12   Tobacco Use     Smoking status: Former     Packs/day: 1.00     Years: 8.00     Pack years: 8.00     Types: Cigarettes     Quit date: 1962     Years since quittin.3     Smokeless tobacco: Never   Substance and Sexual Activity      "Alcohol use: Yes     Comment: Alcoholic Drinks/day: rare     Drug use: No     Sexual activity: Never   Social History Narrative    Lived in sister-in-law's house, who passed away in 2013.    Never .    Lives with niece, Krista, and nephew.       VITALS:  /68   Pulse 75   Temp 98.1  F (36.7  C) (Oral)   Resp 18   Ht 1.6 m (5' 3\")   Wt 68 kg (150 lb)   SpO2 96%   BMI 26.57 kg/m      PHYSICAL EXAM    Gen:  Alert, awake, NAD  HENT:  Head atraumatic, normocephalic.  PERRL.  EOMI.  No periorbital step-offs, depression, tenderness.  No tenderness along the zygomatic arch bilaterally.  Ears atraumatic with no external bleeding or signs of trauma.  No epistaxis.  Clear oropharynx.  Dentition intact.   Respiratory:  Normal respiratory rate.  Lungs CTA.  Chest wall stable to compression.  Nontender chest wall.   Trachea midline.  Cardiovascular:  Regular rate and rhythm.  Palpable radial and DP pulses bilaterally.  Abdomen:  Soft, nontender, normoactive bowel sounds.    Musculoskeletal:  No midline C-spine, T-spine, L-spine tenderness.  No midline spinal step-offs noted.  5/5 strength in all extremities.  No gross deformities noted.  Tenderness to right hip and pain with flexion of right hip to 45 degrees. 3+ edema to bilateral lower extremities.  Integument:  No abrasions, hematomas, lacerations noted.  Ecchymosis overlying right hip and right lower abdomen.  Neuro:  GCS 15, A & O x 3, sensation intact to light touch     LAB:  All pertinent labs reviewed and interpreted.  Results for orders placed or performed during the hospital encounter of 12/19/22   Head CT w/o contrast    Impression    IMPRESSION:  HEAD CT:  1.  No CT evidence for acute intracranial process.  2.  Brain atrophy and presumed chronic microvascular ischemic changes as above.    CERVICAL SPINE CT:  1.  No CT evidence for acute fracture or post traumatic subluxation.  2.  Multilevel cervical spondylosis similar to findings on the previous " exam as above.     Cervical spine CT w/o contrast    Impression    IMPRESSION:  HEAD CT:  1.  No CT evidence for acute intracranial process.  2.  Brain atrophy and presumed chronic microvascular ischemic changes as above.    CERVICAL SPINE CT:  1.  No CT evidence for acute fracture or post traumatic subluxation.  2.  Multilevel cervical spondylosis similar to findings on the previous exam as above.     XR Pelvis and Hip Right 2 Views    Impression    IMPRESSION: No acute fracture or dislocation. 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 Degenerative changes in the lumbar spine. There may be partial ankylosis of the SI joints superiorly.    There is a collection of small rounded calculation projecting over the central pelvis which are nonspecific, bladder stones are possibility given the location.   Chest XR,  PA & LAT    Impression    IMPRESSION: Mild bibasilar opacities, some of which are reticular, suggesting atelectasis and fibrosis. Cannot exclude minimal pleural fluid. No pneumothorax. Stable upper normal heart size. No overt acute fracture by radiography. Bony demineralization   and degenerative changes.     Basic metabolic panel   Result Value Ref Range    Sodium 141 136 - 145 mmol/L    Potassium 4.3 3.5 - 5.0 mmol/L    Chloride 104 98 - 107 mmol/L    Carbon Dioxide (CO2) 32 (H) 22 - 31 mmol/L    Anion Gap 5 5 - 18 mmol/L    Urea Nitrogen 26 8 - 28 mg/dL    Creatinine 1.21 0.70 - 1.30 mg/dL    Calcium 9.5 8.5 - 10.5 mg/dL    Glucose 119 70 - 125 mg/dL    GFR Estimate 58 (L) >60 mL/min/1.73m2   UA with Microscopic reflex to Culture    Specimen: Urine, Clean Catch   Result Value Ref Range    Color Urine Yellow Colorless, Straw, Light Yellow, Yellow    Appearance Urine Clear Clear    Glucose Urine Negative Negative mg/dL    Bilirubin Urine Negative Negative    Ketones Urine Negative Negative mg/dL    Specific Gravity Urine 1.019 1.001 - 1.030    Blood Urine Negative Negative    pH Urine 6.0 5.0 -  7.0    Protein Albumin Urine Negative Negative mg/dL    Urobilinogen Urine <2.0 <2.0 mg/dL    Nitrite Urine Negative Negative    Leukocyte Esterase Urine Negative Negative    Mucus Urine Present (A) None Seen /LPF    RBC Urine 1 <=2 /HPF    WBC Urine 2 <=5 /HPF    Squamous Epithelials Urine <1 <=1 /HPF    Hyaline Casts Urine 14 (H) <=2 /LPF   Result Value Ref Range    Magnesium 1.9 1.8 - 2.6 mg/dL   CBC with platelets and differential   Result Value Ref Range    WBC Count 5.7 4.0 - 11.0 10e3/uL    RBC Count 4.20 (L) 4.40 - 5.90 10e6/uL    Hemoglobin 13.3 13.3 - 17.7 g/dL    Hematocrit 42.0 40.0 - 53.0 %     78 - 100 fL    MCH 31.7 26.5 - 33.0 pg    MCHC 31.7 31.5 - 36.5 g/dL    RDW 13.2 10.0 - 15.0 %    Platelet Count 213 150 - 450 10e3/uL    % Neutrophils 63 %    % Lymphocytes 23 %    % Monocytes 11 %    % Eosinophils 3 %    % Basophils 0 %    % Immature Granulocytes 0 %    NRBCs per 100 WBC 0 <1 /100    Absolute Neutrophils 3.5 1.6 - 8.3 10e3/uL    Absolute Lymphocytes 1.3 0.8 - 5.3 10e3/uL    Absolute Monocytes 0.6 0.0 - 1.3 10e3/uL    Absolute Eosinophils 0.2 0.0 - 0.7 10e3/uL    Absolute Basophils 0.0 0.0 - 0.2 10e3/uL    Absolute Immature Granulocytes 0.0 <=0.4 10e3/uL    Absolute NRBCs 0.0 10e3/uL       RADIOLOGY:  Reviewed all pertinent imaging. Please see official radiology report.  XR Pelvis and Hip Right 2 Views   Final Result   IMPRESSION: No acute fracture or dislocation. 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 Degenerative changes in the lumbar spine. There may be partial ankylosis of the SI joints superiorly.      There is a collection of small rounded calculation projecting over the central pelvis which are nonspecific, bladder stones are possibility given the location.      Chest XR,  PA & LAT   Final Result   IMPRESSION: Mild bibasilar opacities, some of which are reticular, suggesting atelectasis and fibrosis. Cannot exclude minimal pleural fluid. No pneumothorax. Stable  upper normal heart size. No overt acute fracture by radiography. Bony demineralization    and degenerative changes.         Cervical spine CT w/o contrast   Final Result   IMPRESSION:   HEAD CT:   1.  No CT evidence for acute intracranial process.   2.  Brain atrophy and presumed chronic microvascular ischemic changes as above.      CERVICAL SPINE CT:   1.  No CT evidence for acute fracture or post traumatic subluxation.   2.  Multilevel cervical spondylosis similar to findings on the previous exam as above.         Head CT w/o contrast   Final Result   IMPRESSION:   HEAD CT:   1.  No CT evidence for acute intracranial process.   2.  Brain atrophy and presumed chronic microvascular ischemic changes as above.      CERVICAL SPINE CT:   1.  No CT evidence for acute fracture or post traumatic subluxation.   2.  Multilevel cervical spondylosis similar to findings on the previous exam as above.               I, Ashlee Dumont, am serving as a scribe to document services personally performed by Ghada Hirsch, based on my observation and the provider's statements to me. I, Ghada Hirsch MD, attest that Ashlee Dumont is acting in a scribe capacity, has observed my performance of the services and has documented them in accordance with my direction.    Ghada Hirsch MD  Emergency Medicine  [facility] EMERGENCY ROOM  [address] [address] [phone]     Ghada Hirsch MD  12/19/22 5805

## 2022-12-19 NOTE — ED TRIAGE NOTES
Patient here with family, pt has hx of Lewy Body dementia and Parkinson's, pt had what appeared to be a mechanical fall on Friday at home, it was caught on video, he now has pain to R hip and was ambulating until this morning when he said that he did not want to walk anymore.  Thalia Hauser RN.......12/19/2022 2:31 PM     Triage Assessment     Row Name 12/19/22 1425       Triage Assessment (Adult)    Airway WDL WDL       Respiratory WDL    Respiratory WDL WDL       Skin Circulation/Temperature WDL    Skin Circulation/Temperature WDL WDL       Cardiac WDL    Cardiac WDL WDL       Peripheral/Neurovascular WDL    Peripheral Neurovascular WDL WDL       Cognitive/Neuro/Behavioral WDL    Cognitive/Neuro/Behavioral WDL WDL

## 2022-12-20 NOTE — PROGRESS NOTES
12/20/22 1345   Appointment Info   Signing Clinician's Name / Credentials (OT) Frankie Marie OTR/L   Rehab Comments (OT) Pt seen by Lymph first prior to OT lucas.   Quick Adds   Quick Adds Certification   Living Environment   People in Home other relative(s)  (Niece and Nephew)   Current Living Arrangements house   Living Environment Comments Pt not able to give accurate social hx info due to Lewey Body Dementia.  Pt stated he recently moved into a nursing home.   Self-Care   Usual Activity Tolerance good   Current Activity Tolerance moderate   Fall history within last six months yes   Number of times patient has fallen within last six months 2   Activity/Exercise/Self-Care Comment Pt stated he is able to do his own ADLs.  Has a walker at home.   Instrumental Activities of Daily Living (IADL)   IADL Comments Niece and Nephew care for Pt's IADLs   General Information   Onset of Illness/Injury or Date of Surgery 12/19/22   Referring Physician Dr. Edmond Davis   Patient/Family Therapy Goal Statement (OT) None stated by Pt and family not in the room.   Additional Occupational Profile Info/Pertinent History of Current Problem Pt adm to hospital for falls and dementia.  PMH:  Lewey Body Dementia, Parkinson's, HTN, Hyperlipidemia, CAD, Lymph in bilat legs.   Existing Precautions/Restrictions fall   Limitations/Impairments safety/cognitive   Cognitive Status Examination   Orientation Status person;time  (Pt was able to state his full name, knew it was Dec. 2022 but did not know the name of the hospital or that he was in the hospital.)   Follows Commands WFL;50-74% accuracy   Safety Deficit judgment;problem-solving   Visual Perception   Visual Impairment/Limitations corrective lenses full-time  (Reports he is [place] but no HA.)   Range of Motion Comprehensive   General Range of Motion no range of motion deficits identified   Coordination   Fine Motor Coordination Impaired due to tremors from Parkinson's disease.   Bed  Mobility   Bed Mobility rolling left;rolling right;supine-sit;sit-supine   Rolling Left [place] (Bed Mobility) supervision;verbal cues   Rolling Right [place] (Bed Mobility) supervision;verbal cues   Supine-Sit [place] (Bed Mobility) supervision;verbal cues   Sit-Supine [place] (Bed Mobility) supervision;verbal cues   Assistive Device (Bed Mobility) bed rails   Transfers   Transfers bed-chair transfer;sit-stand transfer;toilet transfer   Transfer Skill: Bed to Chair/Chair to Bed   Bed-Chair [place] (Transfers) contact guard   Assistive Device (Bed-Chair Transfers) rolling walker   Sit-Stand Transfer   Sit-Stand [place] (Transfers) contact guard   Assistive Device (Sit-Stand Transfers) walker, front-wheeled   Toilet Transfer   Type (Toilet Transfer) sit-stand;stand-sit   [place] Level (Toilet Transfer) contact guard   Assistive Device (Toilet Transfer) walker, front-wheeled   Balance   Balance Assessment standing balance: dynamic   Activities of Daily Living   BADL Assessment/Intervention lower body dressing;grooming   Lower Body Dressing Assessment/Training   Position (Lower Body Dressing) supported sitting   [place] Level (Lower Body Dressing) maximum assist (25% patient effort)   Grooming Assessment/Training   Position (Grooming) sink side;supported standing   [place] Level (Grooming) contact guard assist   Clinical Impression   Criteria for Skilled Therapeutic Interventions Met (OT) Yes, treatment indicated   OT Diagnosis Decreased indep with ADLs due to Falls, Dementia.   OT Problem List-Impairments impacting ADL problems related to;cognition;mobility   Assessment of Occupational Performance 3-5 Performance Deficits   Identified Performance Deficits dressing, G/H, toileting, bathing, trsfs   Planned Therapy Interventions (OT) ADL retraining   OT Total Evaluation Time   OT Eval, Moderate Complexity Minutes (66707)   (15 min eval.  Charge place by Lymph Therapist.)    Therapy Certification   Medical Diagnosis Falls, Dementia   Start of Care Date 12/20/22   Certification date from 12/20/22   Certification date to 01/20/23   OT Goals   Therapy Frequency (OT) Daily   OT Predicted Duration/Target Date for Goal Attainment 12/26/22   OT Goals Hygiene/Grooming;Lower Body Dressing;Transfers;Cognition   OT: Hygiene/Grooming supervision/stand-by assist   OT: Lower Body Dressing Moderate assist   OT: Transfer Supervision/stand-by assist   OT: Cognitive Patient/caregiver will verbalize understanding of cognitive assessment results/recommendations as needed for safe discharge planning  (with VC as needed.)   Interventions   Interventions Quick Adds Self-Care/Home Management   Self-Care/Home Management   Self-Care/Home Mgmt/ADL, Compensatory, Meal Prep Minutes (90214) 45   Symptoms Noted During/After Treatment (Meal Preparation/Planning Training) none   Treatment Detail/Skilled Intervention Pt up in the chair after having Lymph therapy for bilat L/E wraps.  Pt cooperative and willing to work with OT.  Completed sit to stand trsfs from chair, toilet and bed.  LOB several times when changing direction or backing up to chair or toilet.  CGA using FWW for the chair and bed.  Mod assist from std ht toilet.  Therapist added commode overlay which allowed Pt to be CGA with sit to stand from commode overly.  Worked on G/H cares while standing at the sink.  See G/H below.  Also worked on L/B dressing - see below.  Return to bed and worked on bed mobility with CGA of one.  Pt was able to roll from side to side and move from sidelying to sit with use of bedside rail.  At end of session, Pt was in bed with bed alarm on and call light in hand.   [place] Level (Grooming Training) contact guard   Assistance (Grooming Training) 1 person assist;supervision;verbal cues   Lower Body Dressing Training Assistance maximum assist (25% patient effort)   Lower Body Dressing Training Assistance 1 person  assist  (Pt was able to reach his feet while sitting in the chair but unable to doff or don his socks.  NOTE:  Socks were tight due to lymph wraps.)   [place] Level (Toilet Training) contact guard   Assistance (Toilet Training) 1 person assist   Toilet Training Assistance - Assistive Device commode overlay   OT Discharge Planning   OT Plan OT Plan:  Work on G/H standing at the sink, toileting from commode overlay, daily reality orientation, ACLS.   OT Discharge Recommendation (DC Rec) (S)  Long term care facility  (Or 24 hour care at home.)   OT Rationale for DC Rec Due to decreased cognition and increased risk for falls, Pt will need 24 hour care.   OT Brief overview of current status CGA of one with FWW for room trsfs.  LOB noted when changing directions or backing up with his walker.  Oriented to self, mo and yr but not to hospital.  Was able to stand at the sink for G.H cares with CGA of one.  Step by step direction needed due to decreased cognition.   Total Session Time   Timed Code Treatment Minutes 45   Total Session Time (sum of timed and untimed services) 45    [facility]  OUTPATIENT OCCUPATIONAL THERAPY  EVALUATION  PLAN OF TREATMENT FOR OUTPATIENT REHABILITATION  (COMPLETE FOR INITIAL CLAIMS ONLY)  Patient's Last Name, First Name, M.I.  YOB: 1935  Juan Tee                          Provider's Name  [facility] Medical Record No.  [id number]                             Onset Date:  12/19/22   Start of Care Date:  12/20/22   Type:     ___PT   _X_OT   ___SLP Medical Diagnosis:  Falls, Dementia                    OT Diagnosis:  Decreased indep with ADLs due to Falls, Dementia. Visits from SOC:  1     See note for plan of treatment, functional goals and certification details    I CERTIFY THE NEED FOR THESE SERVICES FURNISHED UNDER        THIS PLAN OF TREATMENT AND WHILE UNDER MY CARE     (Physician co-signature  of this document indicates review and certification of the therapy plan).

## 2022-12-20 NOTE — CONSULTS
[facility]  WOC Nurse Inpatient Assessment     Consulted for: BLE    Patient History (according to provider note(s):        Areas Assessed:      BLE assessed and also spoke with nurse. No open areas noted. Scattered dry patches noted.  Discussed sacrum with nurse as well, no open areas or erythema noted.    Treatment Plan:     Moisturizer to BLE daily or with lymphedema wrap schedule.    Orders: Written    RECOMMEND PRIMARY TEAM ORDER: None, at this time  Education provided: plan of care  Discussed plan of care with: Patient and Nurse  WOC nurse follow-up plan: signing off  Notify WOC if wound(s) deteriorate.  Nursing to notify the Provider(s) and re-consult the WOC Nurse if new skin concern.    DATA:     Current support surface: Standard  Standard gel/foam mattress (IsoFlex, Atmos air, etc)  Containment of urine/stool: Incontinence Protocol  BMI: Body mass index is 26.56 kg/m .   Active diet order: Orders Placed This Encounter      Regular Diet Adult     Output: No intake/output data recorded.     Labs: Recent Labs   Lab 12/20/22  0656   HGB 12.1*   WBC 6.3     Pressure injury risk assessment:   Sensory Perception: 3-->slightly limited  Moisture: 4-->rarely moist  Activity: 3-->walks occasionally  Mobility: 3-->slightly limited  Nutrition: 3-->adequate  Friction and Shear: 2-->potential problem  Jero Score: 18    Sosa Don RN CWOCN

## 2022-12-20 NOTE — PROGRESS NOTES
"[facility]    Medicine Progress Note - Hospitalist Service    Date of Admission:  12/19/2022    Assessment & Plan      Juan Tee is a 87 year old male with history of hypertension, hyperlipidemia, coronary artery disease, Parkinson's disease/Lewy body dementia, admitted on 12/19/2022 for fall.     Fall initial encounter  Right hip ecchymoses  Fall precautions  cold and heat application as needed  acetaminophen 975 mg every 8 hours  lidocaine 5% topical ointment 4 times daily  PT and OT assessment   Care management consultation for dispo position plan    Dementia  Parkinson disease  PTA medication: Sinemet  mg 1 tab 3 times daily    Hypertension  Hyperlipidemia  Coronary artery disease without angina pectoris.   PTA medication: Atorvastatin 10 mg nightly, metoprolol 25 mg nightly, furosemide 20 mg daily as prescribed    Chronic lower extremity edema, lymphedema  Lower extremity wound, ? Sacral pressure injury.  therapy consult for lymphedema wraps.  Wound care consultation, unclear if sacral pressure injury.  Increase furosemide 20 mg daily     Diet: Regular Diet Adult    DVT Prophylaxis: Pneumatic Compression Devices  Larkin Catheter: Not present  Central Lines: None  Cardiac Monitoring: None  Code Status: Full Code      Disposition Plan      Expected Discharge Date: 12/21/2022        Discharge Comments: placement        The patient's care was discussed with the Patient, bedside nurse.    Edmond Davis DO  Hospitalist Service  [facility]    Clinically Significant Risk Factors Present on Admission                       # Overweight: Estimated body mass index is 26.56 kg/m  as calculated from the following:    Height as of this encounter: 1.6 m (5' 2.99\").    Weight as of this encounter: 68 kg (149 lb 14.6 oz).       ______________________________________________________________________    Interval History   Patient with some confusion " overnight, redirectable.  No new complaints. Tolerating breakfast.      Data reviewed today: I reviewed all medications, new labs and imaging results over the last 24 hours. I personally reviewed no images or EKG's today.    Physical Exam   Vital Signs: Temp: 98  F (36.7  C) Temp src: Oral BP: (!) 146/67 Pulse: 67   Resp: 16 SpO2: 97 % O2 Device: None (Room air)    Weight: 149 lbs 14.6 oz  Constitutional: awake, alert, cooperative, no apparent distress, and appears stated age  Respiratory: No increased work of breathing, good air exchange, clear to auscultation bilaterally, no crackles or wheezing  Cardiovascular: Normal apical impulse, regular rate and rhythm, normal S1 and S2, no S3 or S4, and no murmur noted  GI: No scars, normal bowel sounds, soft, non-distended, non-tender, no masses palpated, no hepatosplenomegally  Skin: no bruising or bleeding, normal skin color, texture, turgor and no redness, warmth, or swelling  Musculoskeletal: There is no redness, warmth, or swelling of the joints.  Full range of motion noted.  Motor strength is 5 out of 5 all extremities bilaterally.  Tone is normal.  2+ edema bilateral lower extremity.   Neurologic: Awake, alert, to person.  Moving extremities spontaneously.     Data   Recent Labs   Lab 12/20/22  0656 12/19/22  1629   WBC 6.3 5.7   HGB 12.1* 13.3   MCV 99 100    213    141   POTASSIUM 4.2 4.3   CHLORIDE 105 104   CO2 28 32*   BUN 25 26   CR 1.02 1.21   ANIONGAP 8 5   KACIE 9.1 9.5   GLC 88 119     Recent Results (from the past 24 hour(s))   Head CT w/o contrast    Narrative    EXAM: CT HEAD W/O CONTRAST, CT CERVICAL SPINE W/O CONTRAST  LOCATION: [facility]  DATE/TIME: 12/19/2022 4:12 PM    INDICATION: fall, head injury  COMPARISON: CT head and cervical spine 01/12/2022  TECHNIQUE:   1) Routine CT Head without IV contrast. Multiplanar reformats. Dose reduction techniques were used.  2) Routine CT Cervical Spine without IV contrast.  Multiplanar reformats. Dose reduction techniques were used.    FINDINGS:   HEAD CT:   INTRACRANIAL CONTENTS: No intracranial hemorrhage, extraaxial collection, or mass effect.  No CT evidence of acute infarct. Mild presumed chronic small vessel ischemic changes. Mild generalized volume loss. No hydrocephalus.     VISUALIZED ORBITS/SINUSES/MASTOIDS: No intraorbital abnormality. No paranasal sinus mucosal disease. No middle ear or mastoid effusion.    BONES/SOFT TISSUES: No scalp hematoma. No skull fracture.    CERVICAL SPINE CT:   VERTEBRA: Straightened cervical lordosis. 2 mm degenerative type anterolisthesis at C4-C5 and C7-T1. Unchanged vertebral body heights. No fracture or posttraumatic subluxation.     CANAL/FORAMINA: Diffuse cervical spondylosis with mild diffuse loss of disc height and multilevel endplate spurring including central disc osteophyte complex at C3-C4 and left central disc osteophyte complex at C6-C7 similar to the previous CT.   Multilevel uncovertebral spurring and facet arthropathy. At C3-C4, moderate spinal canal stenosis with moderate to severe right and moderate left neural foraminal stenosis. At C6-C7 and C7-T1, mild spinal canal stenosis. At C6-C7, moderate to severe   bilateral neural foraminal stenosis.    PARASPINAL: Prevertebral soft tissues within normal limits for thickness. Atherosclerotic calcifications the carotid bifurcations. Heterogeneous presumably multinodular thyroid gland without dominant nodule. Visualized lung fields are clear.      Impression    IMPRESSION:  HEAD CT:  1.  No CT evidence for acute intracranial process.  2.  Brain atrophy and presumed chronic microvascular ischemic changes as above.    CERVICAL SPINE CT:  1.  No CT evidence for acute fracture or post traumatic subluxation.  2.  Multilevel cervical spondylosis similar to findings on the previous exam as above.     Cervical spine CT w/o contrast    Narrative    EXAM: CT HEAD W/O CONTRAST, CT CERVICAL SPINE  W/O CONTRAST  LOCATION: [facility]  DATE/TIME: 12/19/2022 4:12 PM    INDICATION: fall, head injury  COMPARISON: CT head and cervical spine 01/12/2022  TECHNIQUE:   1) Routine CT Head without IV contrast. Multiplanar reformats. Dose reduction techniques were used.  2) Routine CT Cervical Spine without IV contrast. Multiplanar reformats. Dose reduction techniques were used.    FINDINGS:   HEAD CT:   INTRACRANIAL CONTENTS: No intracranial hemorrhage, extraaxial collection, or mass effect.  No CT evidence of acute infarct. Mild presumed chronic small vessel ischemic changes. Mild generalized volume loss. No hydrocephalus.     VISUALIZED ORBITS/SINUSES/MASTOIDS: No intraorbital abnormality. No paranasal sinus mucosal disease. No middle ear or mastoid effusion.    BONES/SOFT TISSUES: No scalp hematoma. No skull fracture.    CERVICAL SPINE CT:   VERTEBRA: Straightened cervical lordosis. 2 mm degenerative type anterolisthesis at C4-C5 and C7-T1. Unchanged vertebral body heights. No fracture or posttraumatic subluxation.     CANAL/FORAMINA: Diffuse cervical spondylosis with mild diffuse loss of disc height and multilevel endplate spurring including central disc osteophyte complex at C3-C4 and left central disc osteophyte complex at C6-C7 similar to the previous CT.   Multilevel uncovertebral spurring and facet arthropathy. At C3-C4, moderate spinal canal stenosis with moderate to severe right and moderate left neural foraminal stenosis. At C6-C7 and C7-T1, mild spinal canal stenosis. At C6-C7, moderate to severe   bilateral neural foraminal stenosis.    PARASPINAL: Prevertebral soft tissues within normal limits for thickness. Atherosclerotic calcifications the carotid bifurcations. Heterogeneous presumably multinodular thyroid gland without dominant nodule. Visualized lung fields are clear.      Impression    IMPRESSION:  HEAD CT:  1.  No CT evidence for acute intracranial process.  2.  Brain  atrophy and presumed chronic microvascular ischemic changes as above.    CERVICAL SPINE CT:  1.  No CT evidence for acute fracture or post traumatic subluxation.  2.  Multilevel cervical spondylosis similar to findings on the previous exam as above.     Chest XR,  PA & LAT    Narrative    EXAM: XR CHEST 2 VIEWS  LOCATION: [facility]  DATE/TIME: 12/19/2022 4:22 PM    INDICATION: Fall. Chest pain.  COMPARISON: 01/12/2022.      Impression    IMPRESSION: Mild bibasilar opacities, some of which are reticular, suggesting atelectasis and fibrosis. Cannot exclude minimal pleural fluid. No pneumothorax. Stable upper normal heart size. No overt acute fracture by radiography. Bony demineralization   and degenerative changes.     XR Pelvis and Hip Right 2 Views    Narrative    EXAM: XR PELVIS AND HIP RIGHT 2 VIEWS  LOCATION: [facility]  DATE/TIME: 12/19/2022 4:23 PM    INDICATION: right hip pain, fall  COMPARISON: None.      Impression    IMPRESSION: No acute fracture or dislocation. 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 Degenerative changes in the lumbar spine. There may be partial ankylosis of the SI joints superiorly.    There is a collection of small rounded calculation projecting over the central pelvis which are nonspecific, bladder stones are possibility given the location.     Medications       acetaminophen  975 mg Oral Q8H     aspirin  81 mg Oral QPM     atorvastatin  10 mg Oral QPM     azaTHIOprine  25 mg Oral Daily     carbidopa-levodopa  1 tablet Oral TID     furosemide  20 mg Oral Daily     lidocaine   Topical 4x Daily     metoprolol succinate ER  25 mg Oral QPM     sodium chloride (PF)  3 mL Intracatheter Q8H

## 2022-12-20 NOTE — PROGRESS NOTES
12/20/22 1317   Appointment Info   Signing Clinician's Name / Credentials (OT) GALLO Aiken   Quick Adds   Quick Adds Edema   General Information   Onset of Illness/Injury or Date of Surgery 12/19/22   Referring Physician Edmond Davis DO   Additional Occupational Profile Info/Pertinent History of Current Problem bilteral LE edema   Limitations/Impairments safety/cognitive   Sensory   Sensory Quick Adds sensation intact   Pain Assessment   Patient Currently in Pain No   Edema General Information   Onset of Edema   (Per pt, BLE edema increased ~6 months ago)   Affected Body Part(s) Left LE;Right LE   Edema Etiology Chronic Venous Insfficiency   General Comments/Previous Edema Treatment/Edema Equipment Pt reports having compression socks at home   Edema Examination/Assessment   Skin Condition Pitting;Dryness   Skin Condition Comments very dry, slightly red, pitting edema 2+   Ulcerations No   Skin Integrity intact   Pitting Assessment 2+   Clinical Impression   Criteria for Skilled Therapeutic Interventions Met (OT) Yes, treatment indicated   Edema: Patient Presentation Stage 2 Lymphedema   Edema: Planned Interventions Gradient compression bandaging;Fit for compression garment;Edema exercises;Education   Clinical Decision Making Complexity (OT) low complexity   Risk & Benefits of therapy have been explained evaluation/treatment results reviewed;patient   OT Total Evaluation Time   OT Eval, Low Complexity Minutes (82125) 10   OT Goals   Therapy Frequency (OT) Daily   OT Predicted Duration/Target Date for Goal Attainment 12/26/22   OT Goals Edema   OT: Edema education to increase ability to manage edema after discharge from the hospital Patient;Verbalize;Skin care routine;signs/symptoms of intolerance;wear schedule;limb positioning;garrnet/bandage care   OT: Management of edema bandages Patient;quick wrap;garment(s);Verbalize   OT: Functional edema exercise program to reduce limb volume, increase activity  tolerance and improve independence with ADL Patient;Demonstrates;HEP   Interventions   Interventions Quick Adds Self-Care/Home Management   Self-Care/Home Management   Self-Care/Home Mgmt/ADL, Compensatory, Meal Prep Minutes (08289) 27   Symptoms Noted During/After Treatment (Meal Preparation/Planning Training) none   Treatment Detail/Skilled Intervention Pt in recliner for session. Pt edu on lymphedema SSB wraps and continueing w/ mobility to decrease edema in BLEs. Pt's BLEs assessed, washed w/ cloth, and lotioned. Stockinette donned along w/ artiflex on BLEs. 12x5 SSB wrap on foot/ankle and 12x10 wrap on ankle to below the knee; all wraps w/ spiral technique, medium compression, 75% overlap. Pt edu on PLB, wear schedule, and signs/symptoms to indicate that wraps need to be taken off - pt verbalized understanding. Socks donned and pt ended session in recliner w/ alarm on and all needs w/in reach.   OT Discharge Planning   OT Plan 12/26: reassess wraps and rewrap as needed   OT Discharge Recommendation (DC Rec)   (continue lymph therapy after DC)   OT Rationale for DC Rec Pt needing Ax1 for lymph wraps   Total Session Time   Timed Code Treatment Minutes 27   Total Session Time (sum of timed and untimed services) 37

## 2022-12-20 NOTE — PLAN OF CARE
"PRIMARY DIAGNOSIS: \"GENERIC\" NURSING  OUTPATIENT/OBSERVATION GOALS TO BE MET BEFORE DISCHARGE:  ADLs back to baseline: No    Activity and level of assistance: Ambulating with standby assist, walker and gait belt.    Pain status: Improved-controlled with oral pain medications.    Return to near baseline physical activity: No     Discharge Planner Nurse   Safe discharge environment identified: No, needs placement.  Barriers to discharge: Yes, placement.       Entered by: Aruna Lainez RN 12/20/2022      Please review provider order for any additional goals.   Nurse to notify provider when observation goals have been met and patient is ready for discharge.Goal Outcome Evaluation:                        "

## 2022-12-20 NOTE — CONSULTS
Care Management Initial Consult    General Information  Assessment completed with: Family, niece Luz Elena and her  Ziyad via phone  Type of CM/SW Visit: Initial Assessment    Primary Care Provider verified and updated as needed: Yes   Readmission within the last 30 days: no previous admission in last 30 days      Reason for Consult: discharge planning  Advance Care Planning: Advance Care Planning Reviewed:  (states has HCD, verbally states niece Luz Elena and her  are his decision makers)        Communication Assessment  Patient's communication style: spoken language (English or Bilingual)        Cognitive  Cognitive/Neuro/Behavioral: alert, able to provide history but states to contact his niece and nephew to confirm.  No behaviors noted. He was able to state it was December 2022- thought it was Tuesday (it is late in the day Monday). He was able to say he is at [facility]. He was able to name the President.                Living Environment:   People in home: other relative(s) (niece and nephew)  Luz Elena and Ziyad  Current living Arrangements: house      Able to return to prior arrangements:  (hoping for TCU prior to return home vs family is looking into [facility] placement in near future)     Family/Social Support:  Care provided by: self (family needs to help with most cares or give reminders)  Provides care for: no one  Marital Status:    (niece and her )          Description of Support System: Supportive, Involved       Current Resources:   Patient receiving home care services: No  Community Resources:  (OP Neurologist with [facility])  Equipment currently used at home:  (cane (inside the home), walker (outside the home), Rx glasses)  Supplies currently used at home: Incontinence Supplies, Compression Stockings (use at times)    Employment/Financial:  Employment Status: retired     Employment/ Comments: was in  but is not [facility] connected  Financial Concerns: No concerns  "identified   Referral to Financial Worker: No       Lifestyle & Psychosocial Needs:  Social Determinants of Health     Tobacco Use: Medium Risk     Smoking Tobacco Use: Former     Smokeless Tobacco Use: Never     Passive Exposure: Not on file   Alcohol Use: Not on file   Financial Resource Strain: Not on file   Food Insecurity: Not on file   Transportation Needs: Not on file   Physical Activity: Not on file   Stress: Not on file   Social Connections: Not on file   Intimate Partner Violence: Not on file   Depression: Not at risk     PHQ-2 Score: 0   Housing Stability: Not on file       Functional Status:  Prior to admission patient needed assistance:   Dependent ADLs:: Independent, Ambulation-walker  Dependent IADLs:: Cleaning, Cooking, Laundry, Shopping, Meal Preparation, Medication Management, Money Management, Transportation (family assists)       Mental Health Status:  Mental Health Status:  (does get hallucinations with his lewy body dementia but does not see any Psychiatrist; managed by Neurology. No hx of IP )       Chemical Dependency Status:  Chemical Dependency Status:  (no concerns)             Values/Beliefs:  Spiritual, Cultural Beliefs, Gnosticism Practices, Values that affect care: no          Values/Beliefs Comment: \"Anabaptist\"    Additional Information:  Chart reviewed.     CM reviewed TREVINO notice/education with patient- he requested for CM to review with his niece and nephew. CM reviewed with niece Luz Elena and nephew Ziyad via phone. Both verbalized understanding. Original in chart and copy left in patient room.     Patient provided history that was confirmed by family (niece Luz Elena and her  Ziyad). Patient lives with niece and her . He has been independent with ADL's using a cane (inside home) or walker (outside home). Family manage finances, medications, cooking, cleaning, laundry. No HC services. PCP confirmed.     Had fall prior to coming to ER. Family verbalize patient is not at his " baseline strength and interested in TCU placement; CM sent referrals per request. Family is discussing possible move to [facility] in near future- they have spoke with [facility] in [place] and plan to meet with them tomorrow to discuss options further. Family willing to take patient home from TCU until deciding on plan for more permanent [facility] or LTC placement if needed. Family will transport at hospital discharge.     PT/OT pending.     CM to continue following. Jyotsna Laguna is primary contact for discharge planning. They will either bring in HCD or email to CM.     Kay Hernadez RN

## 2022-12-20 NOTE — PLAN OF CARE
Problem: Pain Acute  Goal: Optimal Pain Control and Function  Intervention: Prevent or Manage Pain  Recent Flowsheet Documentation  Taken 12/20/2022 0050 by Rossy Pang, RN  Medication Review/Management: medications reviewed    Problem: Fall Injury Risk  Goal: Absence of Fall and Fall-Related Injury  Outcome: Progressing  Intervention: Identify and Manage Contributors  Recent Flowsheet Documentation  Taken 12/20/2022 0050 by Rossy Pang, RN  Medication Review/Management: medications reviewed  Intervention: Promote Injury-Free Environment    A&Ox3, disoriented to place. Very forgetful, but easily re-directable. Impulsive setting off the bed alarm frequently. C/o pain in toes, but denied the need for anything. Up SBA and walker. Bruising to right hip from fall at home. Chronic edema to BLE, +3. ;lymphedema consult ordered in addition to PT/OT and WOC. PIV SL. PIV SL. Discharge pending, possibly needs TCU/assisted/LTC placement.

## 2022-12-20 NOTE — PLAN OF CARE
PRIMARY DIAGNOSIS: GENERALIZED WEAKNESS    OUTPATIENT/OBSERVATION GOALS TO BE MET BEFORE DISCHARGE  1. Orthostatic performed: N/A    2. Tolerating PO medications: Yes    3. Return to near baseline physical activity: Yes    4. Cleared for discharge by consultants (if involved): No    Discharge Planner Nurse   Safe discharge environment identified: Yes  Barriers to discharge: Yes       Entered by: Rossy Pang RN 12/20/2022 3:39 AM     Please review provider order for any additional goals.   Nurse to notify provider when observation goals have been met and patient is ready for discharge.Goal Outcome Evaluation:

## 2022-12-20 NOTE — PROGRESS NOTES
12/20/22 0935   Appointment Info   Signing Clinician's Name / Credentials (PT) Ziyad Syed   Living Environment   People in Home other relative(s)   Current Living Arrangements house   Home Accessibility stairs to enter home   Number of Stairs, Main Entrance 1   Transportation Anticipated family or friend will provide   Living Environment Comments Patient states he only has one step into home and can stay on one level.  States he is alone when his niece or nephew are at work   Self-Care   Usual Activity Tolerance good   Current Activity Tolerance moderate   Fall history within last six months yes   Number of times patient has fallen within last six months 2   Activity/Exercise/Self-Care Comment States he normally does his basic ADL's by himself, has a walker but only uses if he leaves the home as it is too small to use walker inside   General Information   Onset of Illness/Injury or Date of Surgery 12/20/22   Patient/Family Therapy Goals Statement (PT) none stated   Pertinent History of Current Problem (include personal factors and/or comorbidities that impact the POC) Fall initial encounter  Right hip ecchymoses   Existing Precautions/Restrictions no known precautions/restrictions   Weight-Bearing Status - RLE weight-bearing as tolerated   Cognition   Affect/Mental Status (Cognition) WFL   Follows Commands (Cognition) WFL   Cognitive Status Comments jameel body dementia   Posture    Posture Not impaired   Range of Motion (ROM)   Range of Motion ROM is WFL   Strength (Manual Muscle Testing)   Strength Comments LE strength observed during gait/stairs seems wfl, noted to struggle wiht sit-stand but also saw he normally uses lift recliner at home to stand up from   Transfers   Transfers sit-stand transfer   Sit-Stand Transfer   Sit-Stand [place] (Transfers) supervision   Assistive Device (Sit-Stand Transfers) walker, front-wheeled;walker, 4-wheeled   Comment, (Sit-Stand Transfer) multiple attempts to stand  from chair needed but finally able to stand each time without assist   Gait/Stairs (Locomotion)   [place] Level (Gait) supervision;verbal cues   Assistive Device (Gait) walker, front-wheeled;walker, 4-wheeled   Distance in Feet 20   Distance in Feet (Gait) 200, 180   Pattern (Gait) swing-through   Deviations/Abnormal Patterns (Gait) base of support, narrow;pipe decreased;festinating/shuffling   Negotiation (Stairs) number of steps   Number of Steps (Stairs) 4   Balance   Balance Comments Balance rated as fair   Sensory Examination   Sensory Perception patient reports no sensory changes   Clinical Impression   Criteria for Skilled Therapeutic Intervention Yes, treatment indicated   PT Diagnosis (PT) impaired functional mobility   Influenced by the following impairments weakness, balance   Functional limitations due to impairments transfers, gait   Clinical Presentation (PT Evaluation Complexity) Unstable/Unpredictable   Clinical Presentation Rationale Pt. presents as medically diagnosed   Clinical Decision Making (Complexity) moderate complexity   Planned Therapy Interventions (PT) balance training;gait training;home exercise program;patient/family education;stair training   Anticipated Equipment Needs at Discharge (PT) walker, rolling   Risk & Benefits of therapy have been explained evaluation/treatment results reviewed;patient   PT Total Evaluation Time   PT Eval, Moderate Complexity Minutes (85097) 15   Physical Therapy Goals   PT Frequency Daily   PT Predicted Duration/Target Date for Goal Attainment 12/27/22   PT Goals Gait;Transfers;Stairs;PT Goal 1   PT: Transfers Modified independent;Sit to/from stand   PT: Gait Modified independent;Rolling walker;100 feet   PT: Stairs Supervision/stand-by assist;4 stairs;Rail on left   PT: Goal 1 I with HEP   Interventions   Interventions Quick Adds Gait Training   Gait Training   Gait Training Minutes (32932) 15   Symptoms Noted During/After Treatment (Gait  Training) none   Treatment Detail/Skilled Intervention ambulating with short steps, feet externally rotated. vc to not bump into objects with walker, no loss of balance. Slightly unsteady one time when turning.  Did slighlty better with use of 4ww than FWW.   [place] Level (Gait Training) contact guard   Physical Assistance Level (Gait Training) 1 person assist   Weight Bearing (Gait Training) full weight-bearing   Assistive Device (Gait Training) rolling walker   Pattern Analysis (Gait Training) swing-through gait   Gait Analysis Deviations decreased step length;decreased stride length   Impairments (Gait Analysis/Training) postural control impaired;balance impaired;coordination impaired;strength decreased   Stair Railings present at both sides   Physical Assist/Nonphysical Assist (Stairs) 1 person assist;supervision   Level of [place] (Stairs) contact guard   PT Discharge Planning   PT Plan progress with gait/transfers   PT Discharge Recommendation (DC Rec) Transitional Care Facility   PT Rationale for DC Rec Paitent not safe to be at home alone at this time due to hx of falls and decreased balance.  Would recommend TCU short stay prior to returning to home. Would benefit from further strrengthening/transfer training, balance actvity   PT Brief overview of current status Paitent is CGA for mobility   Total Session Time   Timed Code Treatment Minutes 15   Total Session Time (sum of timed and untimed services) 30

## 2022-12-20 NOTE — PLAN OF CARE
Problem: Pain Acute  Goal: Optimal Pain Control and Function  Outcome: Progressing   Patient denies pain at rest. Reports some mild right hip pain with ambulation and weight bearing. Scheduled tylenol and lidocaine ointment administered per order. Patient needs encouragement to take tylenol.  Problem: Dementia Signs/Symptoms  Goal: Optimized Cognitive Function (Dementia Signs/Symptoms)  Patient alert and oriented to self, knows that it is December but unsure of the date, thinks he is in a nursing home. Call light and belongings within reach, chair/bed alarm on however patient is frequently attempting to self-transfer/ambulate and not using the call light. Patient is a standby assist with gait belt and walker. Takes medications whole with water. Eating well.   Aruna Lainez RN  12/20/2022

## 2022-12-20 NOTE — PLAN OF CARE
Problem: Plan of Care - These are the overarching goals to be used throughout the patient stay.    Goal: Optimal Comfort and Wellbeing  Outcome: Progressing     Pt alert and oriented with some confusion. Pt denies pain at this time. Declined scheduled Tylenol. Pt is on regular diet. Pt has 3+ edema noted in BLE. IV is saline locked.

## 2022-12-20 NOTE — PLAN OF CARE
"PRIMARY DIAGNOSIS: \"GENERIC\" NURSING  OUTPATIENT/OBSERVATION GOALS TO BE MET BEFORE DISCHARGE:  ADLs back to baseline: No, patient is a standby assist with gait belt for transfers.    Activity and level of assistance: Up with standby assistance.    Pain status: Improved-controlled with oral pain medications. Patient denies pain at rest. Needs encouragement to take scheduled tylenol. Lidocaine ointment applied to right hip as ordered.    Return to near baseline physical activity: Yes     Discharge Planner Nurse   Safe discharge environment identified: No, patient needs placement.  Barriers to discharge: Yes, PT/OT, lymph, and WOC consults pending.       Entered by: Aruna Lainez RN 12/20/2022      Please review provider order for any additional goals.   Nurse to notify provider when observation goals have been met and patient is ready for discharge.Goal Outcome Evaluation:                        "

## 2022-12-21 NOTE — PLAN OF CARE
"Goal Outcome Evaluation:                        Problem: Dementia Signs/Symptoms  Goal: Improved Sleep (Dementia Signs/Symptoms)  Outcome: Progressing     /58 (BP Location: Left arm)   Pulse 70   Temp 97.9  F (36.6  C) (Oral)   Resp 18   Ht 1.6 m (5' 2.99\")   Wt 68 kg (149 lb 14.6 oz)   SpO2 96%   BMI 26.56 kg/m      Pt oriented only to self. Up with stand by assist. Pt very impulsive and illogical. Continually setting off bed alarm and ambulating without assistance. 1:1 for safety.    Pt given haldol and melatonin for sleep and appeared to sleep throughout the night.    Modesto Ross RN    "

## 2022-12-21 NOTE — PLAN OF CARE
Problem: Plan of Care - These are the overarching goals to be used throughout the patient stay.    Goal: Absence of Hospital-Acquired Illness or Injury  Intervention: Identify and Manage Fall Risk  Recent Flowsheet Documentation  Taken 12/20/2022 1800 by Rivas Desir RN  Safety Promotion/Fall Prevention:    activity supervised    bed alarm on    chair alarm on    clutter free environment maintained    fall prevention program maintained    increased rounding and observation    increase visualization of patient    lighting adjusted    mobility aid in reach    nonskid shoes/slippers when out of bed    patient and family education    room door open    room near nurse's station    safety round/check completed   Goal Outcome Evaluation:      Plan of Care Reviewed With: patient    Overall Patient Progress: improvingOverall Patient Progress: improving  Pt alert but disoriented to place and situation.  VSS. Unable to verbalise needs.  Did not display any signs of being in pain, he is on one to one , awaiting placement.

## 2022-12-21 NOTE — PROGRESS NOTES
[facility]    Medicine Progress Note - Hospitalist Service    Date of Admission:  12/19/2022    Assessment & Plan   Juan Tee is a 87 year old male with history of hypertension, hyperlipidemia, coronary artery disease, Parkinson's disease/Lewy body dementia, admitted on 12/19/2022 for fall. Overnight patient with increased agitation required haldol administration and 1:1 sitter on 12/20/22.     Fall initial encounter  Right hip ecchymoses  Fall precautions  Cold and heat application as needed  Acetaminophen 975 mg every 8 hours  Lidocaine 5% topical ointment 4 times daily  PT and OT assessment   Care management consultation for dispo position plan    Dementia with behaviors/agitation  Parkinson disease   PTA medication: Sinemet  mg 1 tab 3 times daily  Order olanzepine 2.5 mg q6h prn and olanzapine 5 mg at bedtime.   Remove 1:1 this afternoon.     Hypertension  Hyperlipidemia  Coronary artery disease without angina pectoris.   PTA medication: Atorvastatin 10 mg nightly, metoprolol 25 mg nightly, furosemide 20 mg daily as prescribed    Chronic lower extremity edema, lymphedema  CKD stage 2/3.   Therapy consult for lymphedema wraps.  Furosemide 20 mg daily  Creatinine improved to 1.02 on 12/20 from 1.2 on admission.       Diet: Regular Diet Adult  Room Service    DVT Prophylaxis: Pneumatic Compression Devices  Larkin Catheter: Not present  Central Lines: None  Cardiac Monitoring: None  Code Status: Full Code      Disposition Plan      Expected Discharge Date: 12/22/2022    Discharge Delays: Placement - TCU    Discharge Comments: placement        The patient's care was discussed with the Bedside Nurse and Patient.    Edmond Davis DO  Hospitalist Service  [facility]    Clinically Significant Risk Factors Present on Admission                       # Overweight: Estimated body mass index is 26.56 kg/m  as calculated from the following:    Height  "as of this encounter: 1.6 m (5' 2.99\").    Weight as of this encounter: 68 kg (149 lb 14.6 oz).           ______________________________________________________________________    Interval History   Patient developed increased agitation and shouting behaviors overnight.  He received Haldol and a 1:1 sitter has been monitoring patient for his safety.  Patient is sleeping during my visit.  He opens his eyes temporarily to my voice and touch.      Data reviewed today: I reviewed all medications, new labs and imaging results over the last 24 hours. I personally reviewed.    Physical Exam   Vital Signs: Temp: 97.9  F (36.6  C) Temp src: Oral BP: 116/58 Pulse: 70   Resp: 18 SpO2: 96 % O2 Device: None (Room air)    Weight: 149 lbs 14.6 oz  Constitutional: Sleeping, cooperative, no apparent distress, and appears stated age  Respiratory: No increased work of breathing, good air exchange, clear to auscultation bilaterally, no crackles or wheezing  Cardiovascular: Normal apical impulse, regular rate and rhythm, normal S1 and S2, no S3 or S4, and no murmur noted  GI: No scars, normal bowel sounds, soft, non-distended, non-tender, no masses palpated, no hepatosplenomegally  Skin: no bruising or bleeding, normal skin color, texture, turgor and no redness, warmth, or swelling  Musculoskeletal: There is no redness, warmth, or swelling of the joints.  Full range of motion noted.  Motor strength is 5 out of 5 all extremities bilaterally.  Tone is normal.  2+ edema bilateral lower extremity.   Neurologic: Lethargic.  Moving extremities spontaneously.   Data   Recent Labs   Lab 12/20/22  0656 12/19/22  1629   WBC 6.3 5.7   HGB 12.1* 13.3   MCV 99 100    213    141   POTASSIUM 4.2 4.3   CHLORIDE 105 104   CO2 28 32*   BUN 25 26   CR 1.02 1.21   ANIONGAP 8 5   KACIE 9.1 9.5   GLC 88 119     No results found for this or any previous visit (from the past 24 hour(s)).  Medications       acetaminophen  975 mg Oral Q8H     aspirin  " 81 mg Oral QPM     atorvastatin  10 mg Oral QPM     azaTHIOprine  25 mg Oral Daily     carbidopa-levodopa  1 tablet Oral TID     furosemide  20 mg Oral Daily     lidocaine   Topical 4x Daily     metoprolol succinate ER  25 mg Oral QPM     OLANZapine zydis  5 mg Oral At Bedtime     sodium chloride (PF)  3 mL Intracatheter Q8H

## 2022-12-21 NOTE — UTILIZATION REVIEW
Concurrent stay review; Secondary Review Determination     Under the authority of the Utilization Management Committee, the utilization review process indicated a secondary review on Juan Tee.  The review outcome is based on review of the medical records, discussions with staff, and applying clinical experience noted on the date of the review.        (x) Observation Status Appropriate - Concurrent stay review    RATIONALE FOR DETERMINATION   87 year old male with history of hypertension, hyperlipidemia, coronary artery disease, Parkinson's disease/Lewy body dementia, admitted on 12/19/2022 for fall. had increased agitation required haldol administration , improving , off 1:1 sitter, pending TCU placement     Patient is clinically improving and there is no clear indication to change patient's status to inpatient. The severity of illness, intensity of service provided, expected LOS and risk for adverse outcome make the care appropriate for observation.    The information on this document is developed by the utilization review team in order for the business office to ensure compliance.  This only denotes the appropriateness of proper admission status and does not reflect the quality of care rendered.         The definitions of Inpatient Status and Observation Status used in making the determination above are those provided in the CMS Coverage Manual, Chapter 1 and Chapter 6, section 70.4.      Sincerely,   Carlos Ma MD  Utilization Review  Physician Advisor  [facility]

## 2022-12-21 NOTE — CONSULTS
Care Management Follow Up    Length of Stay (days): 0    Expected Discharge Date: 12/22/2022     Concerns to be Addressed: TCU placement secured for tomorrow 12/22      Patient plan of care discussed at interdisciplinary rounds: Yes    Anticipated Discharge Disposition: Transitional Care ([facility])  Disposition Comments: Will D/C to [facility] via HE stretcher at 2pm on 12/22.  Anticipated Discharge Services:  ([facility])  Anticipated Discharge DME:  (per ortho/therapy)    Patient/family educated on Medicare website which has current facility and service quality ratings: yes  Education Provided on the Discharge Plan:  Family/HCA's confirm plan for discharge to [facility] via HE stretcher transport. AVS per bedside RN.   Patient/Family in Agreement with the Plan: yes    Referrals Placed by CM/SW: Post Acute Facilities  Private pay costs discussed: None indicated.     Additional Information:  Chart reviewed. PT recommendations for TCU.   CM received call from kyra/STEPHY Laguna requesting an update and clarifying preference for [facility]CU and wanting HE transport (stretcher due to dementia). CM spoke to Alicia in admissions and they can confirm they can accept patient tomorrow 12/22, prefer transport of 1-2pm. CM arranged for HE stretcher transport at 2pm (1st available). No PAS needed. CM completed PCS. CM updated TCU admissions of transport time, Ortho Unit floor phone number for Nurse to Nurse and phone number for assigned SW tomorrow. CM updated Ortho Charge RN and HUC.     CM updated [facility] and [facility] that they can disregard referrals as family chose an alternate placement.      Kyra/STEPHY Laguna did confirm meeting with [facility] in [place] today and paid deposit for placement after TCU stay.     Care Management staff discussed current COVID 19 pandemic and possible Medicare waiver  of the traditional 3 day stay requirement. Referred patient to website medicare.gov, insurance provider, and admissions department at accepting facility for further questions or concerns on coverage for SNF stay.  1. This patient has been evaluated from a nursing home in the emergency area? No  2. This patient is being discharged from a hospital (in the emergency area) in order to provide care to more seriously ill patients? Yes, was in in ER 12/19 and planned discharge to TCU on 12/22    3. This patient needs SNF care as a result of the emergency, regardless of whether he / she was in a hospital / nursing home prior to this stay? Yes    CM emailed HCD to [employer] and that has been validated and updated in Epic.     Kay Hernadez RN

## 2022-12-21 NOTE — PROGRESS NOTES
"PRIMARY DIAGNOSIS: \"GENERIC\" NURSING  OUTPATIENT/OBSERVATION GOALS TO BE MET BEFORE DISCHARGE:  1. ADLs back to baseline: No    2. Activity and level of assistance: GB and walker, SBA to assist of 1    3. Pain status: JENY- patient is not communicating    4. Return to near baseline physical activity: No     Discharge Planner Nurse   Safe discharge environment identified: Yes- needing LTC or senior living  Barriers to discharge: Yes- needing placement       Entered by: Medardo Johnson RN 2022 8:53 AM     Please review provider order for any additional goals.   Nurse to notify provider when observation goals have been met and patient is ready for discharge.    Patient unable to give me his name, , date, and situation. He has been moaning and not saying words (received Haldol at 0025 today). Remains on 1:1. VSS and afebrile. JENY pain as patient is not communicating, will page MD for FYI. Pending discharge to LTC or senior living.  "

## 2022-12-21 NOTE — PROGRESS NOTES
"PRIMARY DIAGNOSIS: \"GENERIC\" NURSING  OUTPATIENT/OBSERVATION GOALS TO BE MET BEFORE DISCHARGE:  1. ADLs back to baseline: No    2. Activity and level of assistance: Up with standby assistance.    3. Pain status: Denies pain, takes scheduled Tylenol     4. Return to near baseline physical activity: No     Discharge Planner Nurse   Safe discharge environment identified: Yes  Barriers to discharge: Yes- needs to be off 1:1 for 24 hours before going to his TCU       Entered by: Medardo Johnson RN 12/21/2022 11:55 AM     Please review provider order for any additional goals.   Nurse to notify provider when observation goals have been met and patient is ready for discharge.    Patient A&Ox2, disoriented to situation and place. Patient is much more alert now and is pleasant. RN had discussion with patient on his care and why he was on a 1:1 and given medication to help calm him down, and patient stated \"that sounds like me\" and laughed. Pt up with SBA with GB and walker. Patient resting in his chair, call light within reach, and alarms on for safety. Pt accepted to TCU but needs to be off 1:1 for 24 hours before they accept him.     1430- Patients 1:1 stopped at 10am, pt has had no behaviors and has been pleasant with staff, taking medication, and making jokes.  "

## 2022-12-22 NOTE — PLAN OF CARE
"Goal Outcome Evaluation:    PRIMARY DIAGNOSIS: \"GENERIC\" NURSING  OUTPATIENT/OBSERVATION GOALS TO BE MET BEFORE DISCHARGE:  1. ADLs back to baseline: No    2. Activity and level of assistance: Ambulating Ax1.     3. Pain status: Pain free.    4. Return to near baseline physical activity: No     Discharge Planner Nurse   Safe discharge environment identified: Yes  Barriers to discharge: Yes; TCU, transport planned for 1400       Entered by: Raquel Bangura RN 12/22/2022 1:57 AM     Please review provider order for any additional goals.   Nurse to notify provider when observation goals have been met and patient is ready for discharge.         "

## 2022-12-22 NOTE — PROGRESS NOTES
"PRIMARY DIAGNOSIS: \"GENERIC\" NURSING  OUTPATIENT/OBSERVATION GOALS TO BE MET BEFORE DISCHARGE:  1. ADLs back to baseline: No    2. Activity and level of assistance: Assist of one with transfers with gait belt and walker    3. Pain status: Pain free.    4. Return to near baseline physical activity: No     Discharge Planner Nurse   Safe discharge environment identified: Yes  Barriers to discharge: No , plan to transfer to [facility] at 1400       Entered by: Karlee Rojas RN 12/22/2022 10:53 AM     Please review provider order for any additional goals.   Nurse to notify provider when observation goals have been met and patient is ready for discharge.  "

## 2022-12-22 NOTE — PROGRESS NOTES
Pt discharging to [facility] with stretcher transport. ...Karlee Rojas RN

## 2022-12-22 NOTE — PLAN OF CARE
"Goal Outcome Evaluation:    PRIMARY DIAGNOSIS: \"GENERIC\" NURSING  OUTPATIENT/OBSERVATION GOALS TO BE MET BEFORE DISCHARGE:  1. ADLs back to baseline: No    2. Activity and level of assistance: Ax1    3. Pain status: Pain free.    4. Return to near baseline physical activity: No     Discharge Planner Nurse   Safe discharge environment identified: Yes  Barriers to discharge: Plan to discharge with stretcher transport to [facility] at 1400       Entered by: Raquel Bangura RN 12/22/2022 4:47 AM     Please review provider order for any additional goals.   Nurse to notify provider when observation goals have been met and patient is ready for discharge.    Pt oriented x3 overnight. Very cooperative and pleasant. Slept well. Drowsy but wakes up to gentle shaking. Ambulating AX1. VSS.   "

## 2022-12-22 NOTE — PROGRESS NOTES
This writer spoke with OLIVIER Pérez at [facility] to provide nurse to nurse report. All questions answered. Plan for patient to discharge around 1400. ...Karlee Rojas RN

## 2022-12-22 NOTE — PROGRESS NOTES
Care Management Discharge Note    Discharge Date: 12/22/2022     Discharge Disposition:  [facility] (TCU)    Discharge Services:  [facility] TCU    Discharge DME:  per ortho/therapy    Discharge Transportation: agency    PAS Confirmation Code:  (No PAS needed for this TCU.)    Patient/family educated on Medicare website which has current facility and service quality ratings: yes    Education Provided on the Discharge Plan:  AVS per bedside RN    Persons Notified of Discharge Plans: patient and Luz Elena (Health Care Agent)    Patient/Family in Agreement with the Plan: yes    Handoff Referral Completed: Yes    Additional Information:  Chart reviewed. CM following for discharge needs. CM met with patient and he is agreeable to discharge plan. Luz Elena (Health Care Agent) is aware of discharge plan and is agreeable. Transportation has been arranged by prior CM.       CM sent discharge orders [facility] ([facility])  CCC referral sent per protocol       Deirdre Mack RN

## 2022-12-22 NOTE — PROGRESS NOTES
Physical Therapy Discharge Summary    Reason for therapy discharge:    Discharged to transitional care facility.    Progress towards therapy goal(s). See goals on Care Plan in [facility] electronic health record for goal details.  Goals not met.  Barriers to achieving goals:   requiring increased assist for safety.    Therapy recommendation(s):    Continued therapy is recommended.  Rationale/Recommendations:  TCU.

## 2022-12-22 NOTE — DISCHARGE SUMMARY
[facility]  Hospitalist Discharge Summary      Date of Admission:  12/19/2022  Date of Discharge:  12/22/2022  Discharging Provider: Edmond Davis DO  Discharge Service: Hospitalist Service    Discharge Diagnoses   Fall initial encounter  Right hip ecchymoses  Dementia with behaviors/agitation  Parkinson disease  Essential hypertension  Hyperlipidemia  Coronary disease without angina pectoris  Chronic lower extremity edema  Lymphedema  Chronic kidney disease stage II  Normocytic anemia    Follow-ups Needed After Discharge   Follow-up Appointments     Follow Up and recommended labs and tests      Follow up with Nursing home physician.  No follow up labs or test are   needed.           Discharge Disposition   Discharged to short-term care facility  Condition at discharge: Stable    Hospital Course   Juan Tee is a 87 year old male with history of hypertension, hyperlipidemia, coronary artery disease, Parkinson's disease/Lewy body dementia, admitted on 12/19/2022 for fall. Patient developed increased agitation required haldol administration and 1:1 sitter on 12/20/22 to 12/21/22.  Patient responded well to melatonin and olanzapine scheduled at night time.  His pain was managed with tylenol and topical lidocaine.  PT OT recommending skilled nursing facility for ongoing rehab.  Patient will likely have to transition to long-term care pending care at TCU.     Fall initial encounter  Right hip ecchymoses  Fall precautions  Cold and heat application as needed  Acetaminophen 975 mg every 8 hours  Lidocaine 5% topical ointment 4 times daily  PT and OT assessment   Care management consultation for dispo position plan    Dementia with behaviors/agitation  Parkinson disease   PTA medication: Sinemet  mg 1 tab 3 times daily  Order olanzepine 2.5 mg q6h prn and olanzapine 5 mg at bedtime.   Remove 1:1 this afternoon.     Hypertension  Hyperlipidemia  Coronary artery disease  without angina pectoris.   PTA medication: Atorvastatin 10 mg nightly, metoprolol 25 mg nightly, furosemide 20 mg daily as prescribed    Chronic lower extremity edema, lymphedema  CKD stage 2/3.   Therapy consult for lymphedema wraps.  Furosemide 20 mg daily  Creatinine improved to 1.02 on 12/20 from 1.2 on admission.      Consultations This Hospital Stay   CARE MANAGEMENT / SOCIAL WORK IP CONSULT  PHYSICAL THERAPY ADULT IP CONSULT  OCCUPATIONAL THERAPY ADULT IP CONSULT  LYMPHEDEMA THERAPY IP CONSULT  WOUND OSTOMY CONTINENCE NURSE  IP CONSULT  CARE MANAGEMENT / SOCIAL WORK IP CONSULT  PHYSICAL THERAPY ADULT IP CONSULT  OCCUPATIONAL THERAPY ADULT IP CONSULT  PHARMACY IP CONSULT  PHYSICAL THERAPY ADULT IP CONSULT  OCCUPATIONAL THERAPY ADULT IP CONSULT    Code Status   Full Code    Time Spent on this Encounter   IEdmond DO, personally saw the patient today and spent greater than 30 minutes discharging this patient.       Edmond Davis DO  [address] [address]  Phone: [phone]  Fax: [phone]  ______________________________________________________________________    Physical Exam   Vital Signs: Temp: 97.1  F (36.2  C) Temp src: Oral BP: (!) 152/66 Pulse: 55   Resp: 16 SpO2: 95 % O2 Device: None (Room air)    Weight: 149 lbs 14.6 oz  Constitutional: Sleeping, cooperative, no apparent distress, and appears stated age  Respiratory: No increased work of breathing, good air exchange, clear to auscultation bilaterally, no crackles or wheezing  Cardiovascular: Normal apical impulse, regular rate and rhythm, normal S1 and S2, no S3 or S4, and no murmur noted  GI: No scars, normal bowel sounds, soft, non-distended, non-tender, no masses palpated, no hepatosplenomegally  Skin: no bruising or bleeding, normal skin color, texture, turgor and no redness, warmth, or swelling  Musculoskeletal: There is no redness, warmth, or swelling  of the joints.  Full range of motion noted.  Motor strength is 5 out of 5 all extremities bilaterally.  Tone is normal.  2+ edema bilateral lower extremity.   Neurologic: Lethargic.  Moving extremities spontaneously.        Primary Care Physician   Keith Steward    Discharge Orders      General info for SNF    Length of Stay Estimate: Short Term Care: Estimated # of Days <30  Condition at Discharge: Improving  Level of care:skilled   Rehabilitation Potential: Fair  Admission H&P remains valid and up-to-date: Yes  Recent Chemotherapy: N/A  Use Nursing Home Standing Orders: Yes     Mantoux instructions    Give two-step Mantoux (PPD) Per Facility Policy Yes     Follow Up and recommended labs and tests    Follow up with Nursing home physician.  No follow up labs or test are needed.     Reason for your hospital stay    Fall, dementia, placement.     Activity - Up with assistive device     Activity - Up with nursing assistance     Full Code     Physical Therapy Adult Consult    Evaluate and treat as clinically indicated.    Reason:  Patient will need strengthening exercise to rebuild mobility.     Occupational Therapy Adult Consult    Evaluate and treat as clinically indicated.    Reason:  Patient will need strengthening exercise to rebuild mobility.     Fall precautions     Diet    Follow this diet upon discharge: Orders Placed This Encounter      Regular Diet Adult       Significant Results and Procedures   Most Recent 3 CBC's:Recent Labs   Lab Test 12/20/22  0656 12/19/22  1629 01/12/22  1831   WBC 6.3 5.7 6.2   HGB 12.1* 13.3 13.0*   MCV 99 100 103*    213 209     Most Recent 3 BMP's:Recent Labs   Lab Test 12/21/22  0934 12/20/22  0656 12/19/22  1629 04/20/22  1420   NA  --  141 141 142   POTASSIUM  --  4.2 4.3 4.5   CHLORIDE  --  105 104 102   CO2  --  28 32* 29   BUN  --  25 26 26   CR  --  1.02 1.21 1.19   ANIONGAP  --  8 5 11   KACIE  --  9.1 9.5 9.4   GLC 98 88 119 112     Most Recent 2 LFT's:Recent Labs    Lab Test 01/26/22  1650 01/12/22  1831   AST 15 18   ALT 12 10   ALKPHOS 115 84   BILITOTAL 0.5 0.5     Most Recent ESR & CRP:Recent Labs   Lab Test 01/30/20  1510 12/13/19  1118   SED  --  16*   CRP <0.1 0.1     Most Recent Anemia Panel:Recent Labs   Lab Test 12/20/22  0656 12/19/22  1629 01/14/22  0728   WBC 6.3   < >  --    HGB 12.1*   < >  --    HCT 38.0*   < >  --    MCV 99   < >  --       < >  --    B12  --   --  781    < > = values in this interval not displayed.   ,   Results for orders placed or performed during the hospital encounter of 12/19/22   Head CT w/o contrast    Narrative    EXAM: CT HEAD W/O CONTRAST, CT CERVICAL SPINE W/O CONTRAST  LOCATION: [facility]  DATE/TIME: 12/19/2022 4:12 PM    INDICATION: fall, head injury  COMPARISON: CT head and cervical spine 01/12/2022  TECHNIQUE:   1) Routine CT Head without IV contrast. Multiplanar reformats. Dose reduction techniques were used.  2) Routine CT Cervical Spine without IV contrast. Multiplanar reformats. Dose reduction techniques were used.    FINDINGS:   HEAD CT:   INTRACRANIAL CONTENTS: No intracranial hemorrhage, extraaxial collection, or mass effect.  No CT evidence of acute infarct. Mild presumed chronic small vessel ischemic changes. Mild generalized volume loss. No hydrocephalus.     VISUALIZED ORBITS/SINUSES/MASTOIDS: No intraorbital abnormality. No paranasal sinus mucosal disease. No middle ear or mastoid effusion.    BONES/SOFT TISSUES: No scalp hematoma. No skull fracture.    CERVICAL SPINE CT:   VERTEBRA: Straightened cervical lordosis. 2 mm degenerative type anterolisthesis at C4-C5 and C7-T1. Unchanged vertebral body heights. No fracture or posttraumatic subluxation.     CANAL/FORAMINA: Diffuse cervical spondylosis with mild diffuse loss of disc height and multilevel endplate spurring including central disc osteophyte complex at C3-C4 and left central disc osteophyte complex at C6-C7 similar to the  previous CT.   Multilevel uncovertebral spurring and facet arthropathy. At C3-C4, moderate spinal canal stenosis with moderate to severe right and moderate left neural foraminal stenosis. At C6-C7 and C7-T1, mild spinal canal stenosis. At C6-C7, moderate to severe   bilateral neural foraminal stenosis.    PARASPINAL: Prevertebral soft tissues within normal limits for thickness. Atherosclerotic calcifications the carotid bifurcations. Heterogeneous presumably multinodular thyroid gland without dominant nodule. Visualized lung fields are clear.      Impression    IMPRESSION:  HEAD CT:  1.  No CT evidence for acute intracranial process.  2.  Brain atrophy and presumed chronic microvascular ischemic changes as above.    CERVICAL SPINE CT:  1.  No CT evidence for acute fracture or post traumatic subluxation.  2.  Multilevel cervical spondylosis similar to findings on the previous exam as above.     Cervical spine CT w/o contrast    Narrative    EXAM: CT HEAD W/O CONTRAST, CT CERVICAL SPINE W/O CONTRAST  LOCATION: [facility]  DATE/TIME: 12/19/2022 4:12 PM    INDICATION: fall, head injury  COMPARISON: CT head and cervical spine 01/12/2022  TECHNIQUE:   1) Routine CT Head without IV contrast. Multiplanar reformats. Dose reduction techniques were used.  2) Routine CT Cervical Spine without IV contrast. Multiplanar reformats. Dose reduction techniques were used.    FINDINGS:   HEAD CT:   INTRACRANIAL CONTENTS: No intracranial hemorrhage, extraaxial collection, or mass effect.  No CT evidence of acute infarct. Mild presumed chronic small vessel ischemic changes. Mild generalized volume loss. No hydrocephalus.     VISUALIZED ORBITS/SINUSES/MASTOIDS: No intraorbital abnormality. No paranasal sinus mucosal disease. No middle ear or mastoid effusion.    BONES/SOFT TISSUES: No scalp hematoma. No skull fracture.    CERVICAL SPINE CT:   VERTEBRA: Straightened cervical lordosis. 2 mm degenerative type  anterolisthesis at C4-C5 and C7-T1. Unchanged vertebral body heights. No fracture or posttraumatic subluxation.     CANAL/FORAMINA: Diffuse cervical spondylosis with mild diffuse loss of disc height and multilevel endplate spurring including central disc osteophyte complex at C3-C4 and left central disc osteophyte complex at C6-C7 similar to the previous CT.   Multilevel uncovertebral spurring and facet arthropathy. At C3-C4, moderate spinal canal stenosis with moderate to severe right and moderate left neural foraminal stenosis. At C6-C7 and C7-T1, mild spinal canal stenosis. At C6-C7, moderate to severe   bilateral neural foraminal stenosis.    PARASPINAL: Prevertebral soft tissues within normal limits for thickness. Atherosclerotic calcifications the carotid bifurcations. Heterogeneous presumably multinodular thyroid gland without dominant nodule. Visualized lung fields are clear.      Impression    IMPRESSION:  HEAD CT:  1.  No CT evidence for acute intracranial process.  2.  Brain atrophy and presumed chronic microvascular ischemic changes as above.    CERVICAL SPINE CT:  1.  No CT evidence for acute fracture or post traumatic subluxation.  2.  Multilevel cervical spondylosis similar to findings on the previous exam as above.     XR Pelvis and Hip Right 2 Views    Narrative    EXAM: XR PELVIS AND HIP RIGHT 2 VIEWS  LOCATION: [facility]  DATE/TIME: 12/19/2022 4:23 PM    INDICATION: right hip pain, fall  COMPARISON: None.      Impression    IMPRESSION: No acute fracture or dislocation. 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 Degenerative changes in the lumbar spine. There may be partial ankylosis of the SI joints superiorly.    There is a collection of small rounded calculation projecting over the central pelvis which are nonspecific, bladder stones are possibility given the location.   Chest XR,  PA & LAT    Narrative    EXAM: XR CHEST 2 VIEWS  LOCATION: [facility]  DATE/TIME: 12/19/2022 4:22 PM    INDICATION: Fall. Chest pain.  COMPARISON: 01/12/2022.      Impression    IMPRESSION: Mild bibasilar opacities, some of which are reticular, suggesting atelectasis and fibrosis. Cannot exclude minimal pleural fluid. No pneumothorax. Stable upper normal heart size. No overt acute fracture by radiography. Bony demineralization   and degenerative changes.           Discharge Medications   Current Discharge Medication List      START taking these medications    Details   acetaminophen (TYLENOL) 325 MG tablet Take 3 tablets (975 mg) by mouth every 8 hours for 7 days  Qty: 63 tablet, Refills: 0    Associated Diagnoses: Hip pain, right      lidocaine (XYLOCAINE) 5 % external ointment Apply topically 4 times daily  Qty: 50 g, Refills: 1    Associated Diagnoses: Hip pain, right      !! melatonin 1 MG TABS tablet Take 1 tablet (1 mg) by mouth At Bedtime  Qty: 30 tablet, Refills: 1    Associated Diagnoses: Senile dementia, with behavioral disturbance      OLANZapine zydis (ZYPREXA) 5 MG ODT Take 1 tablet (5 mg) by mouth At Bedtime  Qty: 30 tablet, Refills: 0    Associated Diagnoses: Senile dementia, with behavioral disturbance      senna-docusate (SENOKOT-S/PERICOLACE) 8.6-50 MG tablet Take 2 tablets by mouth 2 times daily as needed for constipation  Qty: 30 tablet, Refills: 1    Associated Diagnoses: Constipation, unspecified constipation type       !! - Potential duplicate medications found. Please discuss with provider.      CONTINUE these medications which have NOT CHANGED    Details   aspirin 81 mg chewable tablet Take 81 mg by mouth every evening       atorvastatin (LIPITOR) 10 MG tablet Take 10 mg by mouth every evening      azaTHIOprine (IMURAN) 50 mg tablet Take 25 mg by mouth daily       calcium carbonate (OS-KACIE) 1500 (600 Ca) MG tablet Take 600 mg by mouth daily      carbidopa-levodopa (SINEMET)  MG tablet Take 1 tablet by mouth 3 x per day 4 - 5  hours apart.  Qty: 270 tablet, Refills: 1    Associated Diagnoses: Parkinson's disease (H)      cholecalciferol, vitamin D3, 5,000 unit capsule [CHOLECALCIFEROL, VITAMIN D3, 5,000 UNIT CAPSULE] Take 5,000 Units by mouth daily.      DOCOSAHEXANOIC ACID/EPA (FISH OIL ORAL) [DOCOSAHEXANOIC ACID/EPA (FISH OIL ORAL)] Take 1,200 mg by mouth daily.      FOLIC ACID/MULTIVIT-MIN/LUTEIN (CENTRUM SILVER ORAL) [FOLIC ACID/MULTIVIT-MIN/LUTEIN (CENTRUM SILVER ORAL)] Take 1 tablet by mouth daily.      furosemide (LASIX) 20 MG tablet Take 1 tablet (20 mg) by mouth daily  Qty: 30 tablet, Refills: 0    Associated Diagnoses: Lymphedema of both lower extremities      !! melatonin 5 MG tablet Take 5 mg by mouth At Bedtime      metoprolol succinate ER (TOPROL-XL) 25 MG 24 hr tablet Take 25 mg by mouth every evening      VIT C/E/ZN/COPPR/LUTEIN/ZEAXAN (PRESERVISION AREDS 2 ORAL) [VIT C/E/ZN/COPPR/LUTEIN/ZEAXAN (PRESERVISION AREDS 2 ORAL)] Take 1 capsule by mouth 2 (two) times a day.       !! - Potential duplicate medications found. Please discuss with provider.        Allergies   No Known Allergies

## 2022-12-22 NOTE — PLAN OF CARE
Lymphedema Discharge Summary    Reason for therapy discharge:    All goals and outcomes met, no further needs identified.    Progress towards therapy goal(s). See goals on Care Plan in [facility] electronic health record for goal details.  Goals met    Therapy recommendation(s):    Continued therapy is recommended.  Rationale/Recommendations:  progress to [facility] in 24-48 hrs at TCU.

## 2022-12-22 NOTE — PROGRESS NOTES
"PRIMARY DIAGNOSIS: \"GENERIC\" NURSING  OUTPATIENT/OBSERVATION GOALS TO BE MET BEFORE DISCHARGE:  1. ADLs back to baseline: No    2. Activity and level of assistance: Up with standby assistance.    3. Pain status: denies pain, taking scheduled tylenol     4. Return to near baseline physical activity: No     Discharge Planner Nurse   Safe discharge environment identified: Yes  Barriers to discharge: Yes needs to be off 1:1 for 24 hours before going to TCU ride is scheduled tomorrow at 2pm     Shift : 1102-5289   Pt A/O x 3, disoriented to place. Pt was pleasant through out shift. Up in josé miguel. Ambulates with SBA GB and walker. Call light reviewed on how to use pt repeated back understanding. Alarms on for safety. Pt 1:1 was discontinued this morning at 1000.        Entered by: Anaid Kimble RN 12/21/2022 7:45 PM     Please review provider order for any additional goals.   Nurse to notify provider when observation goals have been met and patient is ready for discharge.  "

## 2022-12-22 NOTE — PROGRESS NOTES
Occupational Therapy Discharge Summary    Reason for therapy discharge:    Discharged to transitional care facility.    Progress towards therapy goal(s). See goals on Care Plan in [facility] electronic health record for goal details.  Goals partially met.  Barriers to achieving goals:   discharge from facility.    Therapy recommendation(s):    Continued therapy is recommended.  Rationale/Recommendations:  To increase indep with ADLs and trsfs and to further access cognition.

## 2022-12-23 NOTE — LETTER
12/23/2022        RE: Juan Tee  [address] [facility] [address]        [facility] GERIATRICS    PRIMARY CARE PROVIDER AND CLINIC:  Keith Steward MD, [address] [facility] / [facility] [address]  Chief Complaint   Patient presents with     Hospital F/U      [place] Medical Record Number:  [id number]  Place of Service where encounter took place:  [facility] ([facility]) [[id number]]    Juan Tee  is a 87 year old  (1935), admitted to the above facility from  [facility]. Hospital stay 12/19/22 through 12/22/22..     HPI:    Patient is an 88yo male with PMHx HTN, HLD, CAD, parkinson's disease with lewy body dementia, CKD-2, anemia, lymphedema who was admitted at [facility] from 12/19 - 12/22, 2022 after sustaining a mechanical fall at home with resultant right hip pain and ecchymosis. Patient resides with niece and has a camera in his room, he was noted to fall and be unable to rise prompting family to bring him to ED for evaluation. Imaging included plain films of hip/pelvis and CT head/neck which were negative for acute injury. He was admitted for pain control, PT evaluations. Hospitalization complicated by mild delirium requiring PRN antipsychotics, 1:1 sitter. This improved with scheduled zyprexa, melatonin. He was able to participate in PT evaluation, referred to TCU for ongoing rehab, medical management.    Patient is seen today as initial visit. He is sitting up in chair at bedside, has not had ongoing behaviors since arrival at TCU. He denies pain to his hip or elsewhere. No cp, sob.    CODE STATUS/ADVANCE DIRECTIVES DISCUSSION:  Full Code  CPR/Full code   ALLERGIES: No Known Allergies   PAST MEDICAL HISTORY:   Past Medical History:   Diagnosis Date     History of skin cancer      HTN (hypertension)      Infection due to 2019 novel coronavirus       PAST SURGICAL HISTORY:   has a past surgical history that  includes OPEN RX PATELLA FX and hernia repair.  FAMILY HISTORY: family history includes Acute Myocardial Infarction (age of onset: 68) in his brother; CABG (age of onset: 72) in his brother; Cerebrovascular Disease (age of onset: 68) in his father; Congenital heart disease (age of onset: 0) in his brother; Coronary Artery Disease in his brother; Kidney failure (age of onset: 54) in his mother; No Known Problems in his sister, sister, sister, and sister; Rheumatic fever in his sister; Sudden Death (age of onset: 72) in his father.  SOCIAL HISTORY:   reports that he quit smoking about 60 years ago. His smoking use included cigarettes and cigarettes. He has a 8.00 pack-year smoking history. He has never used smokeless tobacco. He reports current alcohol use. He reports that he does not use drugs.  Patient's living condition: lives with family, niece     Post Discharge Medication Reconciliation Status:   MED REC REQUIRED  Post Medication Reconciliation Status: discharge medications reconciled and changed, per note/orders       Current Outpatient Medications   Medication Sig     acetaminophen (TYLENOL) 325 MG tablet Take 3 tablets (975 mg) by mouth every 8 hours for 7 days     aspirin 81 mg chewable tablet Take 81 mg by mouth every evening      atorvastatin (LIPITOR) 10 MG tablet Take 10 mg by mouth every evening     azaTHIOprine (IMURAN) 50 mg tablet Take 25 mg by mouth daily      calcium carbonate (OS-KACIE) 1500 (600 Ca) MG tablet Take 600 mg by mouth daily     carbidopa-levodopa (SINEMET)  MG tablet Take 1 tablet by mouth 3 x per day 4 - 5 hours apart.     cholecalciferol, vitamin D3, 5,000 unit capsule [CHOLECALCIFEROL, VITAMIN D3, 5,000 UNIT CAPSULE] Take 5,000 Units by mouth daily.     DOCOSAHEXANOIC ACID/EPA (FISH OIL ORAL) [DOCOSAHEXANOIC ACID/EPA (FISH OIL ORAL)] Take 1,200 mg by mouth daily.     FOLIC ACID/MULTIVIT-MIN/LUTEIN (CENTRUM SILVER ORAL) [FOLIC ACID/MULTIVIT-MIN/LUTEIN (CENTRUM SILVER ORAL)] Take  "1 tablet by mouth daily.     furosemide (LASIX) 20 MG tablet Take 1 tablet (20 mg) by mouth daily     lidocaine (XYLOCAINE) 5 % external ointment Apply topically 4 times daily     melatonin 5 MG tablet Take 5 mg by mouth At Bedtime     metoprolol succinate ER (TOPROL-XL) 25 MG 24 hr tablet Take 25 mg by mouth every evening     OLANZapine zydis (ZYPREXA) 5 MG ODT Take 1 tablet (5 mg) by mouth At Bedtime     senna-docusate (SENOKOT-S/PERICOLACE) 8.6-50 MG tablet Take 2 tablets by mouth 2 times daily as needed for constipation     VIT C/E/ZN/COPPR/LUTEIN/ZEAXAN (PRESERVISION AREDS 2 ORAL) [VIT C/E/ZN/COPPR/LUTEIN/ZEAXAN (PRESERVISION AREDS 2 ORAL)] Take 1 capsule by mouth 2 (two) times a day.     No current facility-administered medications for this visit.       ROS:  Limited by cognitive impairment, denies pain, sob, abdominal discomfort.    Vitals:  /66   Pulse 64   Temp 97  F (36.1  C)   Resp 16   Ht 1.676 m (5' 6\")   Wt 70.3 kg (155 lb)   SpO2 98%   BMI 25.02 kg/m    Exam:    GEN: well-developed, well-nourished elderly male, appears comfortable  HEENT: NCAT, EOM intact bilaterally, sclera clear, conjunctiva normal, nose & mouth patent  CHEST: lungs CTA bilaterally, no increased work of breathing, no wheeze, crackles, rhonchi  HEART: RRR, S1 & S2, no murmur  ABD: soft, nontender, nondistended, no guarding or rigidity, +BS in all 4 quadrants  MSK: AROM bilateral UE/LE, pedal & radial pulses 2+ bilaterally  NEURO: awake, alert, oriented to name, place, and time. CN II-XII grossly intact. Sensation grossly intact to light touch.   SKIN: warm & dry without rash, 1-2+ nonpitting pedal edema bilaterally    Lab/Diagnostic data:    Most Recent 3 CBC's:  Recent Labs   Lab Test 12/20/22  0656 12/19/22  1629 01/12/22  1831   WBC 6.3 5.7 6.2   HGB 12.1* 13.3 13.0*   MCV 99 100 103*    213 209     Most Recent 3 BMP's:  Recent Labs   Lab Test 12/21/22  0934 12/20/22  0656 12/19/22  1629 04/20/22  1420   NA  -- "  141 141 142   POTASSIUM  --  4.2 4.3 4.5   CHLORIDE  --  105 104 102   CO2  --  28 32* 29   BUN  --  25 26 26   CR  --  1.02 1.21 1.19   ANIONGAP  --  8 5 11   KACIE  --  9.1 9.5 9.4   GLC 98 88 119 112       ASSESSMENT/PLAN:    S/P Mechanical Fall  R hip pain, ecchymosis  Physical deconditioning  Impaired mobility and ADLs  Reported to fall at home and unable to rise due to right hip pain/ecchymosis. Plain films negative for injury. Pain improved with topical lidocaine, scheduled tylenol.  -Continue pain control with scheduled tylenol, topical lidocaine  -PT/OT evaluations  -SW to follow for safe discharge planning    Dementia with behaviors  Parkinson's   Delirium, improved  -Continues on Sinemet 25/100 TID  -Continue olanzapine 5mg at bedtime, melatonin at bedtime  -Will have PRN olanzapine 2.5mg available for 7 days, plan to evaluate if needed and beneficial    CAD  HTN / HLD  Chronic, stable.  -Continue atorvastatin 10mg daily, metoprolol 25mg daily, furosemide 20mg daily, ASA 81mg daily    BLE Lymphedema  -Lymphedema wraps    Polymyositis  -Continue azathioprine    CKD-2  Baseline Cr nml, elevated at 1.2 on admission. Improved to 1.02 upon discharge.  -Monitor periodically      Orders:  NNO    Total time spent with patient visit at the skilled nursing facility was 35 min including patient visit and review of past records. Greater than 50% of total time spent with counseling and coordinating care due to medical complexity.     Electronically signed by:  Maxim Bryan PA-C                      Sincerely,        Maxim Bryan PA-C

## 2022-12-23 NOTE — PROGRESS NOTES
[facility] GERIATRICS    PRIMARY CARE PROVIDER AND CLINIC:  Keith Steward MD, [address] [facility] [address]  Chief Complaint   Patient presents with     Hospital F/U      [place] Medical Record Number:  [id number]  Place of Service where encounter took place:  [facility] (SNF) [[id number]]    Juan Tee  is a 87 year old  (1935), admitted to the above facility from  [facility]. Hospital stay 12/19/22 through 12/22/22..     HPI:    Patient is an 86yo male with PMHx HTN, HLD, CAD, parkinson's disease with lewy body dementia, CKD-2, anemia, lymphedema who was admitted at [facility] from 12/19 - 12/22, 2022 after sustaining a mechanical fall at home with resultant right hip pain and ecchymosis. Patient resides with niece and has a camera in his room, he was noted to fall and be unable to rise prompting family to bring him to ED for evaluation. Imaging included plain films of hip/pelvis and CT head/neck which were negative for acute injury. He was admitted for pain control, PT evaluations. Hospitalization complicated by mild delirium requiring PRN antipsychotics, 1:1 sitter. This improved with scheduled zyprexa, melatonin. He was able to participate in PT evaluation, referred to TCU for ongoing rehab, medical management.    Patient is seen today as initial visit. He is sitting up in chair at bedside, has not had ongoing behaviors since arrival at TCU. He denies pain to his hip or elsewhere. No cp, sob.    CODE STATUS/ADVANCE DIRECTIVES DISCUSSION:  Full Code  CPR/Full code   ALLERGIES: No Known Allergies   PAST MEDICAL HISTORY:   Past Medical History:   Diagnosis Date     History of skin cancer      HTN (hypertension)      Infection due to 2019 novel coronavirus       PAST SURGICAL HISTORY:   has a past surgical history that includes OPEN RX PATELLA FX and hernia repair.  FAMILY HISTORY: family history includes Acute Myocardial  Infarction (age of onset: 68) in his brother; CABG (age of onset: 72) in his brother; Cerebrovascular Disease (age of onset: 68) in his father; Congenital heart disease (age of onset: 0) in his brother; Coronary Artery Disease in his brother; Kidney failure (age of onset: 54) in his mother; No Known Problems in his sister, sister, sister, and sister; Rheumatic fever in his sister; Sudden Death (age of onset: 72) in his father.  SOCIAL HISTORY:   reports that he quit smoking about 60 years ago. His smoking use included cigarettes and cigarettes. He has a 8.00 pack-year smoking history. He has never used smokeless tobacco. He reports current alcohol use. He reports that he does not use drugs.  Patient's living condition: lives with family, niece     Post Discharge Medication Reconciliation Status:   MED REC REQUIRED  Post Medication Reconciliation Status: discharge medications reconciled and changed, per note/orders       Current Outpatient Medications   Medication Sig     acetaminophen (TYLENOL) 325 MG tablet Take 3 tablets (975 mg) by mouth every 8 hours for 7 days     aspirin 81 mg chewable tablet Take 81 mg by mouth every evening      atorvastatin (LIPITOR) 10 MG tablet Take 10 mg by mouth every evening     azaTHIOprine (IMURAN) 50 mg tablet Take 25 mg by mouth daily      calcium carbonate (OS-KACIE) 1500 (600 Ca) MG tablet Take 600 mg by mouth daily     carbidopa-levodopa (SINEMET)  MG tablet Take 1 tablet by mouth 3 x per day 4 - 5 hours apart.     cholecalciferol, vitamin D3, 5,000 unit capsule [CHOLECALCIFEROL, VITAMIN D3, 5,000 UNIT CAPSULE] Take 5,000 Units by mouth daily.     DOCOSAHEXANOIC ACID/EPA (FISH OIL ORAL) [DOCOSAHEXANOIC ACID/EPA (FISH OIL ORAL)] Take 1,200 mg by mouth daily.     FOLIC ACID/MULTIVIT-MIN/LUTEIN (CENTRUM SILVER ORAL) [FOLIC ACID/MULTIVIT-MIN/LUTEIN (CENTRUM SILVER ORAL)] Take 1 tablet by mouth daily.     furosemide (LASIX) 20 MG tablet Take 1 tablet (20 mg) by mouth daily      "lidocaine (XYLOCAINE) 5 % external ointment Apply topically 4 times daily     melatonin 5 MG tablet Take 5 mg by mouth At Bedtime     metoprolol succinate ER (TOPROL-XL) 25 MG 24 hr tablet Take 25 mg by mouth every evening     OLANZapine zydis (ZYPREXA) 5 MG ODT Take 1 tablet (5 mg) by mouth At Bedtime     senna-docusate (SENOKOT-S/PERICOLACE) 8.6-50 MG tablet Take 2 tablets by mouth 2 times daily as needed for constipation     VIT C/E/ZN/COPPR/LUTEIN/ZEAXAN (PRESERVISION AREDS 2 ORAL) [VIT C/E/ZN/COPPR/LUTEIN/ZEAXAN (PRESERVISION AREDS 2 ORAL)] Take 1 capsule by mouth 2 (two) times a day.     No current facility-administered medications for this visit.       ROS:  Limited by cognitive impairment, denies pain, sob, abdominal discomfort.    Vitals:  /66   Pulse 64   Temp 97  F (36.1  C)   Resp 16   Ht 1.676 m (5' 6\")   Wt 70.3 kg (155 lb)   SpO2 98%   BMI 25.02 kg/m    Exam:    GEN: well-developed, well-nourished elderly male, appears comfortable  HEENT: NCAT, EOM intact bilaterally, sclera clear, conjunctiva normal, nose & mouth patent  CHEST: lungs CTA bilaterally, no increased work of breathing, no wheeze, crackles, rhonchi  HEART: RRR, S1 & S2, no murmur  ABD: soft, nontender, nondistended, no guarding or rigidity, +BS in all 4 quadrants  MSK: AROM bilateral UE/LE, pedal & radial pulses 2+ bilaterally  NEURO: awake, alert, oriented to name, place, and time. CN II-XII grossly intact. Sensation grossly intact to light touch.   SKIN: warm & dry without rash, 1-2+ nonpitting pedal edema bilaterally    Lab/Diagnostic data:    Most Recent 3 CBC's:  Recent Labs   Lab Test 12/20/22  0656 12/19/22  1629 01/12/22  1831   WBC 6.3 5.7 6.2   HGB 12.1* 13.3 13.0*   MCV 99 100 103*    213 209     Most Recent 3 BMP's:  Recent Labs   Lab Test 12/21/22  0934 12/20/22  0656 12/19/22  1629 04/20/22  1420   NA  --  141 141 142   POTASSIUM  --  4.2 4.3 4.5   CHLORIDE  --  105 104 102   CO2  --  28 32* 29   BUN  --  " 25 26 26   CR  --  1.02 1.21 1.19   ANIONGAP  --  8 5 11   KACIE  --  9.1 9.5 9.4   GLC 98 88 119 112       ASSESSMENT/PLAN:    S/P Mechanical Fall  R hip pain, ecchymosis  Physical deconditioning  Impaired mobility and ADLs  Reported to fall at home and unable to rise due to right hip pain/ecchymosis. Plain films negative for injury. Pain improved with topical lidocaine, scheduled tylenol.  -Continue pain control with scheduled tylenol, topical lidocaine  -PT/OT evaluations  -SW to follow for safe discharge planning    Dementia with behaviors  Parkinson's   Delirium, improved  -Continues on Sinemet 25/100 TID  -Continue olanzapine 5mg at bedtime, melatonin at bedtime  -Will have PRN olanzapine 2.5mg available for 7 days, plan to evaluate if needed and beneficial    CAD  HTN / HLD  Chronic, stable.  -Continue atorvastatin 10mg daily, metoprolol 25mg daily, furosemide 20mg daily, ASA 81mg daily    BLE Lymphedema  -Lymphedema wraps    Polymyositis  -Continue azathioprine    CKD-2  Baseline Cr nml, elevated at 1.2 on admission. Improved to 1.02 upon discharge.  -Monitor periodically      Orders:  NNO    Total time spent with patient visit at the skilled nursing facility was 35 min including patient visit and review of past records. Greater than 50% of total time spent with counseling and coordinating care due to medical complexity.     Electronically signed by:  Maxim Bryan PA-C

## 2022-12-26 PROBLEM — I83.019: Status: RESOLVED | Noted: 2022-04-06 | Resolved: 2022-01-01

## 2022-12-30 NOTE — PROGRESS NOTES
[facility] GERIATRICS    Chief Complaint   Patient presents with     RECHECK     HPI:  Juan Tee is a 87 year old  (1935), who is being seen today for an episodic care visit at: [facility] ([facility]) [[id number]].     Brief summary: Patient is an 86yo male with PMHx HTN, HLD, CAD, parkinson's disease with lewy body dementia, CKD-2, anemia, lymphedema who was admitted at [facility] from 12/19 - 12/22, 2022 after sustaining a mechanical fall at home with resultant right hip pain and ecchymosis. Patient resides with niece and has a camera in his room, he was noted to fall and be unable to rise prompting family to bring him to ED for evaluation. Imaging included plain films of hip/pelvis and CT head/neck which were negative for acute injury. He was admitted for pain control, PT evaluations. Hospitalization complicated by mild delirium requiring PRN antipsychotics, 1:1 sitter. This improved with scheduled zyprexa, melatonin. He was able to participate in PT evaluation, referred to TCU for ongoing rehab, medical management.    Patient is seen today in follow-up. He is sitting up in recliner at bedside, has no complaints or concerns today. Has been working with therapies, feels his strength is improving. Hip pain has resolved. Today he indicates he will be moving to an assisted living at time of discharge, a decision he is comfortable with. Denies cp, sob.    Allergies, and PMH/PSH reviewed in EPIC today.  REVIEW OF SYSTEMS:  4 point ROS including Respiratory, CV, GI and , other than that noted in the HPI,  is negative    Objective:   There were no vitals taken for this visit.    GEN: well-developed, well-nourished elderly male, appears comfortable  HEENT: NCAT, EOM intact bilaterally, sclera clear, conjunctiva normal, nose & mouth patent  CHEST: lungs CTA bilaterally, no increased work of breathing, no wheeze, crackles, rhonchi  HEART: RRR, S1 & S2  ABD: soft, nontender,  nondistended  MSK: AROM bilateral UE/LE  NEURO: awake, alert, oriented to name, place, and time. CN II-XII grossly intact. Sensation grossly intact to light touch.   SKIN: warm & dry without rash, 1-2+ nonpitting pedal edema bilaterally      Most Recent 3 CBC's:Recent Labs   Lab Test 12/20/22  0656 12/19/22  1629 01/12/22  1831   WBC 6.3 5.7 6.2   HGB 12.1* 13.3 13.0*   MCV 99 100 103*    213 209     Most Recent 3 BMP's:Recent Labs   Lab Test 12/21/22  0934 12/20/22  0656 12/19/22  1629 04/20/22  1420   NA  --  141 141 142   POTASSIUM  --  4.2 4.3 4.5   CHLORIDE  --  105 104 102   CO2  --  28 32* 29   BUN  --  25 26 26   CR  --  1.02 1.21 1.19   ANIONGAP  --  8 5 11   KACIE  --  9.1 9.5 9.4   GLC 98 88 119 112       Assessment/Plan:    S/P Mechanical Fall  R hip pain, ecchymosis  Physical deconditioning  Impaired mobility and ADLs  Reported to fall at home and unable to rise due to right hip pain/ecchymosis. Plain films negative for injury. Pain improved with topical lidocaine, scheduled tylenol. Walking 187ft with 2WW in therapy.  -Continue pain control with scheduled tylenol, topical lidocaine  -PT/OT evaluations  -SW to follow for safe discharge planning, anticipate discharge to [facility]     Dementia with behaviors  Parkinson's   Delirium, improved  -Continues on Sinemet 25/100 TID  -Continue olanzapine 5mg at bedtime, melatonin at bedtime  -Will have PRN olanzapine 2.5mg available for 7 days, plan to evaluate if needed and beneficial     CAD  HTN / HLD  Chronic, stable. BPs ranging 105-110, HRs 60-78.  -Continue atorvastatin 10mg daily, metoprolol 25mg daily, furosemide 20mg daily, ASA 81mg daily     BLE Lymphedema  -Lymphedema wraps     Polymyositis  -Continue azathioprine     CKD-2  Baseline Cr nml, elevated at 1.2 on admission. Improved to 1.02 upon discharge.  -Monitor periodically    MED REC REQUIRED  Post Medication Reconciliation Status: medication reconcilation previously completed during another office  visit      Orders:  NNO    Electronically signed by: Maxim Bryna PA-C

## 2022-12-30 NOTE — LETTER
12/30/2022        RE: Juan Tee  [address] [facility] [address]        [facility] GERIATRICS    Chief Complaint   Patient presents with     RECHECK     HPI:  Juan Tee is a 87 year old  (1935), who is being seen today for an episodic care visit at: [facility] ([facility]) [[id number]].     Brief summary: Patient is an 88yo male with PMHx HTN, HLD, CAD, parkinson's disease with lewy body dementia, CKD-2, anemia, lymphedema who was admitted at [facility] from 12/19 - 12/22, 2022 after sustaining a mechanical fall at home with resultant right hip pain and ecchymosis. Patient resides with niece and has a camera in his room, he was noted to fall and be unable to rise prompting family to bring him to ED for evaluation. Imaging included plain films of hip/pelvis and CT head/neck which were negative for acute injury. He was admitted for pain control, PT evaluations. Hospitalization complicated by mild delirium requiring PRN antipsychotics, 1:1 sitter. This improved with scheduled zyprexa, melatonin. He was able to participate in PT evaluation, referred to TCU for ongoing rehab, medical management.    Patient is seen today in follow-up. He is sitting up in recliner at bedside, has no complaints or concerns today. Has been working with therapies, feels his strength is improving. Hip pain has resolved. Today he indicates he will be moving to an assisted living at time of discharge, a decision he is comfortable with. Denies cp, sob.    Allergies, and PMH/PSH reviewed in [facility] today.  REVIEW OF SYSTEMS:  4 point ROS including Respiratory, CV, GI and , other than that noted in the HPI,  is negative    Objective:   There were no vitals taken for this visit.    GEN: well-developed, well-nourished elderly male, appears comfortable  HEENT: NCAT, EOM intact bilaterally, sclera clear, conjunctiva normal, nose & mouth patent  CHEST: lungs CTA bilaterally, no increased  work of breathing, no wheeze, crackles, rhonchi  HEART: RRR, S1 & S2  ABD: soft, nontender, nondistended  MSK: AROM bilateral UE/LE  NEURO: awake, alert, oriented to name, place, and time. CN II-XII grossly intact. Sensation grossly intact to light touch.   SKIN: warm & dry without rash, 1-2+ nonpitting pedal edema bilaterally      Most Recent 3 CBC's:Recent Labs   Lab Test 12/20/22  0656 12/19/22  1629 01/12/22  1831   WBC 6.3 5.7 6.2   HGB 12.1* 13.3 13.0*   MCV 99 100 103*    213 209     Most Recent 3 BMP's:Recent Labs   Lab Test 12/21/22  0934 12/20/22  0656 12/19/22  1629 04/20/22  1420   NA  --  141 141 142   POTASSIUM  --  4.2 4.3 4.5   CHLORIDE  --  105 104 102   CO2  --  28 32* 29   BUN  --  25 26 26   CR  --  1.02 1.21 1.19   ANIONGAP  --  8 5 11   KACIE  --  9.1 9.5 9.4   GLC 98 88 119 112       Assessment/Plan:    S/P Mechanical Fall  R hip pain, ecchymosis  Physical deconditioning  Impaired mobility and ADLs  Reported to fall at home and unable to rise due to right hip pain/ecchymosis. Plain films negative for injury. Pain improved with topical lidocaine, scheduled tylenol. Walking 187ft with 2WW in therapy.  -Continue pain control with scheduled tylenol, topical lidocaine  -PT/OT evaluations  -SW to follow for safe discharge planning, anticipate discharge to [facility]     Dementia with behaviors  Parkinson's   Delirium, improved  -Continues on Sinemet 25/100 TID  -Continue olanzapine 5mg at bedtime, melatonin at bedtime  -Will have PRN olanzapine 2.5mg available for 7 days, plan to evaluate if needed and beneficial     CAD  HTN / HLD  Chronic, stable. BPs ranging 105-110, HRs 60-78.  -Continue atorvastatin 10mg daily, metoprolol 25mg daily, furosemide 20mg daily, ASA 81mg daily     BLE Lymphedema  -Lymphedema wraps     Polymyositis  -Continue azathioprine     CKD-2  Baseline Cr nml, elevated at 1.2 on admission. Improved to 1.02 upon discharge.  -Monitor periodically    MED REC REQUIRED  Post Medication  Reconciliation Status: medication reconcilation previously completed during another office visit      Orders:  NNO    Electronically signed by: Maxim Bryan PA-C             Sincerely,        Maxim Bryan PA-C

## 2022-12-30 NOTE — TELEPHONE ENCOUNTER
Rx Authorization:  Requested Medication/ Dose *carbidopa-levodopa (SINEMET)  MG tablet  Date last refill ordered: 6/13/22  Quantity ordered: 270  # refills: 1  Date of last clinic visit with ordering provider: 6/13/22  Date of next clinic visit with ordering provider:   All pertinent protocol data (lab date/result):   Include pertinent information from patients message:

## 2023-01-01 ENCOUNTER — DOCUMENTATION ONLY (OUTPATIENT)
Dept: OTHER | Facility: CLINIC | Age: 88
End: 2023-01-01
Payer: MEDICARE

## 2023-01-01 ENCOUNTER — APPOINTMENT (OUTPATIENT)
Dept: RADIOLOGY | Facility: CLINIC | Age: 88
DRG: 193 | End: 2023-01-01
Attending: EMERGENCY MEDICINE
Payer: MEDICARE

## 2023-01-01 ENCOUNTER — DISCHARGE SUMMARY NURSING HOME (OUTPATIENT)
Dept: GERIATRICS | Facility: CLINIC | Age: 88
End: 2023-01-01
Payer: MEDICARE

## 2023-01-01 ENCOUNTER — APPOINTMENT (OUTPATIENT)
Dept: RADIOLOGY | Facility: CLINIC | Age: 88
End: 2023-01-01
Attending: EMERGENCY MEDICINE
Payer: MEDICARE

## 2023-01-01 ENCOUNTER — APPOINTMENT (OUTPATIENT)
Dept: OCCUPATIONAL THERAPY | Facility: CLINIC | Age: 88
DRG: 193 | End: 2023-01-01
Attending: FAMILY MEDICINE
Payer: MEDICARE

## 2023-01-01 ENCOUNTER — LAB REQUISITION (OUTPATIENT)
Dept: LAB | Facility: CLINIC | Age: 88
End: 2023-01-01
Payer: MEDICARE

## 2023-01-01 ENCOUNTER — MEDICAL CORRESPONDENCE (OUTPATIENT)
Dept: HEALTH INFORMATION MANAGEMENT | Facility: CLINIC | Age: 88
End: 2023-01-01

## 2023-01-01 ENCOUNTER — APPOINTMENT (OUTPATIENT)
Dept: CARDIOLOGY | Facility: CLINIC | Age: 88
DRG: 193 | End: 2023-01-01
Attending: FAMILY MEDICINE
Payer: MEDICARE

## 2023-01-01 ENCOUNTER — HOSPITAL ENCOUNTER (OUTPATIENT)
Facility: CLINIC | Age: 88
Setting detail: OBSERVATION
Discharge: NURSING, REHAB OR LONG-TERM CARE | End: 2023-09-03
Attending: EMERGENCY MEDICINE | Admitting: EMERGENCY MEDICINE
Payer: MEDICARE

## 2023-01-01 ENCOUNTER — HOSPITAL ENCOUNTER (INPATIENT)
Facility: CLINIC | Age: 88
LOS: 3 days | Discharge: HOSPICE | DRG: 193 | End: 2023-09-02
Attending: EMERGENCY MEDICINE | Admitting: FAMILY MEDICINE
Payer: MEDICARE

## 2023-01-01 ENCOUNTER — VIRTUAL VISIT (OUTPATIENT)
Dept: NEUROLOGY | Facility: CLINIC | Age: 88
End: 2023-01-01
Payer: MEDICARE

## 2023-01-01 ENCOUNTER — APPOINTMENT (OUTPATIENT)
Dept: CT IMAGING | Facility: CLINIC | Age: 88
DRG: 193 | End: 2023-01-01
Attending: EMERGENCY MEDICINE
Payer: MEDICARE

## 2023-01-01 VITALS
DIASTOLIC BLOOD PRESSURE: 59 MMHG | HEIGHT: 71 IN | RESPIRATION RATE: 16 BRPM | SYSTOLIC BLOOD PRESSURE: 109 MMHG | OXYGEN SATURATION: 95 % | HEART RATE: 82 BPM | WEIGHT: 186.5 LBS | BODY MASS INDEX: 26.11 KG/M2 | TEMPERATURE: 98.3 F

## 2023-01-01 VITALS
DIASTOLIC BLOOD PRESSURE: 57 MMHG | TEMPERATURE: 97.9 F | HEIGHT: 66 IN | WEIGHT: 158 LBS | HEART RATE: 78 BPM | RESPIRATION RATE: 16 BRPM | OXYGEN SATURATION: 98 % | SYSTOLIC BLOOD PRESSURE: 101 MMHG | BODY MASS INDEX: 25.39 KG/M2

## 2023-01-01 VITALS
RESPIRATION RATE: 24 BRPM | DIASTOLIC BLOOD PRESSURE: 65 MMHG | SYSTOLIC BLOOD PRESSURE: 116 MMHG | TEMPERATURE: 96.4 F | OXYGEN SATURATION: 96 % | HEART RATE: 63 BPM

## 2023-01-01 DIAGNOSIS — M25.551 HIP PAIN, RIGHT: ICD-10-CM

## 2023-01-01 DIAGNOSIS — I13.0 HYPERTENSIVE HEART AND CHRONIC KIDNEY DISEASE WITH HEART FAILURE AND STAGE 1 THROUGH STAGE 4 CHRONIC KIDNEY DISEASE, OR UNSPECIFIED CHRONIC KIDNEY DISEASE (H): ICD-10-CM

## 2023-01-01 DIAGNOSIS — G31.83 LEWY BODY DEMENTIA WITH OTHER BEHAVIORAL DISTURBANCE, UNSPECIFIED DEMENTIA SEVERITY (H): ICD-10-CM

## 2023-01-01 DIAGNOSIS — Z13.1 ENCOUNTER FOR SCREENING FOR DIABETES MELLITUS: ICD-10-CM

## 2023-01-01 DIAGNOSIS — J18.9 PNEUMONIA DUE TO INFECTIOUS ORGANISM, UNSPECIFIED LATERALITY, UNSPECIFIED PART OF LUNG: ICD-10-CM

## 2023-01-01 DIAGNOSIS — R21 GROIN RASH: ICD-10-CM

## 2023-01-01 DIAGNOSIS — G20.A1 PARKINSON'S DISEASE (H): Primary | ICD-10-CM

## 2023-01-01 DIAGNOSIS — R53.81 PHYSICAL DECONDITIONING: ICD-10-CM

## 2023-01-01 DIAGNOSIS — F02.811 DEMENTIA DUE TO PARKINSON'S DISEASE, WITH AGITATION, UNSPECIFIED DEMENTIA SEVERITY (H): ICD-10-CM

## 2023-01-01 DIAGNOSIS — N18.2 CKD (CHRONIC KIDNEY DISEASE) STAGE 2, GFR 60-89 ML/MIN: ICD-10-CM

## 2023-01-01 DIAGNOSIS — E55.9 VITAMIN D DEFICIENCY, UNSPECIFIED: ICD-10-CM

## 2023-01-01 DIAGNOSIS — E53.8 DEFICIENCY OF OTHER SPECIFIED B GROUP VITAMINS: ICD-10-CM

## 2023-01-01 DIAGNOSIS — I10 ESSENTIAL (PRIMARY) HYPERTENSION: ICD-10-CM

## 2023-01-01 DIAGNOSIS — F06.8 PSYCHOSIS DUE TO PARKINSON'S DISEASE (H): ICD-10-CM

## 2023-01-01 DIAGNOSIS — E78.5 HYPERLIPIDEMIA, UNSPECIFIED: ICD-10-CM

## 2023-01-01 DIAGNOSIS — D64.9 ANEMIA, UNSPECIFIED: ICD-10-CM

## 2023-01-01 DIAGNOSIS — R09.02 HYPOXIA: ICD-10-CM

## 2023-01-01 DIAGNOSIS — G20.A1 PARKINSONS DISEASE (H): ICD-10-CM

## 2023-01-01 DIAGNOSIS — E87.6 HYPOKALEMIA: ICD-10-CM

## 2023-01-01 DIAGNOSIS — F03.90 SENILE DEMENTIA (H): ICD-10-CM

## 2023-01-01 DIAGNOSIS — R53.1 WEAKNESS: ICD-10-CM

## 2023-01-01 DIAGNOSIS — Z78.9 IMPAIRED MOBILITY AND ACTIVITIES OF DAILY LIVING: ICD-10-CM

## 2023-01-01 DIAGNOSIS — R45.1 AGITATION: ICD-10-CM

## 2023-01-01 DIAGNOSIS — Z74.09 IMPAIRED MOBILITY AND ACTIVITIES OF DAILY LIVING: ICD-10-CM

## 2023-01-01 DIAGNOSIS — G20.A1 DEMENTIA DUE TO PARKINSON'S DISEASE, WITH AGITATION, UNSPECIFIED DEMENTIA SEVERITY (H): ICD-10-CM

## 2023-01-01 DIAGNOSIS — R29.6 FALLS FREQUENTLY: ICD-10-CM

## 2023-01-01 DIAGNOSIS — G20.A1 PSYCHOSIS DUE TO PARKINSON'S DISEASE (H): ICD-10-CM
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
## 2023-01-01 DIAGNOSIS — E07.9 DISORDER OF THYROID, UNSPECIFIED: ICD-10-CM

## 2023-01-01 DIAGNOSIS — R69 TERMINAL ILLNESS: Primary | ICD-10-CM

## 2023-01-01 DIAGNOSIS — F02.818 LEWY BODY DEMENTIA WITH OTHER BEHAVIORAL DISTURBANCE, UNSPECIFIED DEMENTIA SEVERITY (H): ICD-10-CM

## 2023-01-01 LAB
ALBUMIN SERPL BCG-MCNC: 3.5 G/DL (ref 3.5–5.2)
ALBUMIN SERPL BCG-MCNC: 3.8 G/DL (ref 3.5–5.2)
ALBUMIN SERPL-MCNC: 2.6 G/DL (ref 3.5–5)
ALBUMIN UR-MCNC: 10 MG/DL
ALP SERPL-CCNC: 107 U/L (ref 40–129)
ALP SERPL-CCNC: 94 U/L (ref 45–120)
ALP SERPL-CCNC: 99 U/L (ref 40–129)
ALT SERPL W P-5'-P-CCNC: 16 U/L (ref 10–50)
ALT SERPL W P-5'-P-CCNC: 6 U/L (ref 0–70)
ALT SERPL W P-5'-P-CCNC: <9 U/L (ref 0–45)
ANION GAP SERPL CALCULATED.3IONS-SCNC: 10 MMOL/L (ref 5–18)
ANION GAP SERPL CALCULATED.3IONS-SCNC: 11 MMOL/L (ref 5–18)
ANION GAP SERPL CALCULATED.3IONS-SCNC: 13 MMOL/L (ref 7–15)
ANION GAP SERPL CALCULATED.3IONS-SCNC: 16 MMOL/L (ref 7–15)
ANION GAP SERPL CALCULATED.3IONS-SCNC: 7 MMOL/L (ref 5–18)
APPEARANCE UR: CLEAR
AST SERPL W P-5'-P-CCNC: 21 U/L (ref 0–40)
AST SERPL W P-5'-P-CCNC: 24 U/L (ref 0–45)
AST SERPL W P-5'-P-CCNC: 24 U/L (ref 10–50)
ATRIAL RATE - MUSE: 78 BPM
BACTERIA #/AREA URNS HPF: ABNORMAL /HPF
BACTERIA BLD CULT: NO GROWTH
BACTERIA UR CULT: NO GROWTH
BASE EXCESS BLDV CALC-SCNC: 5.4 MMOL/L
BASOPHILS # BLD AUTO: 0 10E3/UL (ref 0–0.2)
BASOPHILS NFR BLD AUTO: 0 %
BILIRUB SERPL-MCNC: 0.4 MG/DL
BILIRUB SERPL-MCNC: 0.4 MG/DL
BILIRUB SERPL-MCNC: 0.8 MG/DL (ref 0–1)
BILIRUB UR QL STRIP: NEGATIVE
BNP SERPL-MCNC: 405 PG/ML (ref 0–93)
BUN SERPL-MCNC: 12 MG/DL (ref 8–28)
BUN SERPL-MCNC: 17 MG/DL (ref 8–28)
BUN SERPL-MCNC: 18 MG/DL (ref 8–28)
BUN SERPL-MCNC: 19.2 MG/DL (ref 8–23)
BUN SERPL-MCNC: 31.1 MG/DL (ref 8–23)
CALCIUM SERPL-MCNC: 7.6 MG/DL (ref 8.5–10.5)
CALCIUM SERPL-MCNC: 7.8 MG/DL (ref 8.5–10.5)
CALCIUM SERPL-MCNC: 8.2 MG/DL (ref 8.5–10.5)
CALCIUM SERPL-MCNC: 9 MG/DL (ref 8.8–10.2)
CALCIUM SERPL-MCNC: 9.1 MG/DL (ref 8.8–10.2)
CAOX CRY #/AREA URNS HPF: ABNORMAL /HPF
CHLORIDE BLD-SCNC: 110 MMOL/L (ref 98–107)
CHLORIDE BLD-SCNC: 113 MMOL/L (ref 98–107)
CHLORIDE BLD-SCNC: 113 MMOL/L (ref 98–107)
CHLORIDE SERPL-SCNC: 103 MMOL/L (ref 98–107)
CHLORIDE SERPL-SCNC: 103 MMOL/L (ref 98–107)
CHOLEST SERPL-MCNC: 114 MG/DL
CO2 SERPL-SCNC: 24 MMOL/L (ref 22–31)
CO2 SERPL-SCNC: 25 MMOL/L (ref 22–31)
CO2 SERPL-SCNC: 26 MMOL/L (ref 22–31)
COLOR UR AUTO: YELLOW
CREAT SERPL-MCNC: 0.86 MG/DL (ref 0.7–1.3)
CREAT SERPL-MCNC: 0.95 MG/DL (ref 0.7–1.3)
CREAT SERPL-MCNC: 0.96 MG/DL (ref 0.7–1.3)
CREAT SERPL-MCNC: 1.2 MG/DL (ref 0.67–1.17)
CREAT SERPL-MCNC: 1.21 MG/DL (ref 0.67–1.17)
DEPRECATED CALCIDIOL+CALCIFEROL SERPL-MC: 89 UG/L (ref 20–75)
DEPRECATED HCO3 PLAS-SCNC: 24 MMOL/L (ref 22–29)
DEPRECATED HCO3 PLAS-SCNC: 24 MMOL/L (ref 22–29)
DIASTOLIC BLOOD PRESSURE - MUSE: 68 MMHG
EOSINOPHIL # BLD AUTO: 0.1 10E3/UL (ref 0–0.7)
EOSINOPHIL NFR BLD AUTO: 1 %
ERYTHROCYTE [DISTWIDTH] IN BLOOD BY AUTOMATED COUNT: 13.9 % (ref 10–15)
ERYTHROCYTE [DISTWIDTH] IN BLOOD BY AUTOMATED COUNT: 15 % (ref 10–15)
ERYTHROCYTE [DISTWIDTH] IN BLOOD BY AUTOMATED COUNT: 15.3 % (ref 10–15)
ERYTHROCYTE [DISTWIDTH] IN BLOOD BY AUTOMATED COUNT: 15.4 % (ref 10–15)
FOLATE SERPL-MCNC: >40 NG/ML (ref 4.6–34.8)
GFR SERPL CREATININE-BSD FRML MDRD: 58 ML/MIN/1.73M2
GFR SERPL CREATININE-BSD FRML MDRD: 59 ML/MIN/1.73M2
GFR SERPL CREATININE-BSD FRML MDRD: 77 ML/MIN/1.73M2
GFR SERPL CREATININE-BSD FRML MDRD: 78 ML/MIN/1.73M2
GFR SERPL CREATININE-BSD FRML MDRD: 84 ML/MIN/1.73M2
GLUCOSE BLD-MCNC: 103 MG/DL (ref 70–125)
GLUCOSE BLD-MCNC: 124 MG/DL (ref 70–125)
GLUCOSE BLD-MCNC: 79 MG/DL (ref 70–125)
GLUCOSE BLDC GLUCOMTR-MCNC: 148 MG/DL (ref 70–99)
GLUCOSE BLDC GLUCOMTR-MCNC: 67 MG/DL (ref 70–99)
GLUCOSE SERPL-MCNC: 100 MG/DL (ref 70–99)
GLUCOSE SERPL-MCNC: 175 MG/DL (ref 70–99)
GLUCOSE UR STRIP-MCNC: NEGATIVE MG/DL
HBA1C MFR BLD: 6 %
HBA1C MFR BLD: 6.3 %
HCO3 BLDV-SCNC: 29 MMOL/L (ref 24–30)
HCT VFR BLD AUTO: 30.5 % (ref 40–53)
HCT VFR BLD AUTO: 33.4 % (ref 40–53)
HCT VFR BLD AUTO: 40.8 % (ref 40–53)
HCT VFR BLD AUTO: 41.9 % (ref 40–53)
HDLC SERPL-MCNC: 52 MG/DL
HGB BLD-MCNC: 10.8 G/DL (ref 13.3–17.7)
HGB BLD-MCNC: 12.2 G/DL (ref 13.3–17.7)
HGB BLD-MCNC: 12.8 G/DL (ref 13.3–17.7)
HGB BLD-MCNC: 9.5 G/DL (ref 13.3–17.7)
HGB UR QL STRIP: NEGATIVE
HOLD SPECIMEN: NORMAL
IMM GRANULOCYTES # BLD: 0 10E3/UL
IMM GRANULOCYTES NFR BLD: 1 %
INR PPP: 1.23 (ref 0.85–1.15)
INTERPRETATION ECG - MUSE: NORMAL
IRON BINDING CAPACITY (ROCHE): 172 UG/DL (ref 240–430)
IRON SATN MFR SERPL: 11 % (ref 15–46)
IRON SERPL-MCNC: 19 UG/DL (ref 61–157)
KETONES UR STRIP-MCNC: ABNORMAL MG/DL
LACTATE SERPL-SCNC: 0.8 MMOL/L (ref 0.7–2)
LDLC SERPL CALC-MCNC: 39 MG/DL
LEUKOCYTE ESTERASE UR QL STRIP: NEGATIVE
LVEF ECHO: NORMAL
LYMPHOCYTES # BLD AUTO: 1.1 10E3/UL (ref 0.8–5.3)
LYMPHOCYTES NFR BLD AUTO: 14 %
MCH RBC QN AUTO: 30.4 PG (ref 26.5–33)
MCH RBC QN AUTO: 31 PG (ref 26.5–33)
MCHC RBC AUTO-ENTMCNC: 29.9 G/DL (ref 31.5–36.5)
MCHC RBC AUTO-ENTMCNC: 30.5 G/DL (ref 31.5–36.5)
MCHC RBC AUTO-ENTMCNC: 31.1 G/DL (ref 31.5–36.5)
MCHC RBC AUTO-ENTMCNC: 32.3 G/DL (ref 31.5–36.5)
MCV RBC AUTO: 102 FL (ref 78–100)
MCV RBC AUTO: 104 FL (ref 78–100)
MCV RBC AUTO: 96 FL (ref 78–100)
MCV RBC AUTO: 98 FL (ref 78–100)
MONOCYTES # BLD AUTO: 0.8 10E3/UL (ref 0–1.3)
MONOCYTES NFR BLD AUTO: 10 %
MUCOUS THREADS #/AREA URNS LPF: PRESENT /LPF
NEUTROPHILS # BLD AUTO: 5.5 10E3/UL (ref 1.6–8.3)
NEUTROPHILS NFR BLD AUTO: 74 %
NITRATE UR QL: NEGATIVE
NONHDLC SERPL-MCNC: 62 MG/DL
NRBC # BLD AUTO: 0 10E3/UL
NRBC BLD AUTO-RTO: 0 /100
NT-PROBNP SERPL-MCNC: 4454 PG/ML (ref 0–1800)
OXYHGB MFR BLDV: 39.3 % (ref 70–75)
P AXIS - MUSE: 43 DEGREES
PCO2 BLDV: 43 MM HG (ref 35–50)
PH BLDV: 7.45 [PH] (ref 7.35–7.45)
PH UR STRIP: 6 [PH] (ref 5–7)
PLATELET # BLD AUTO: 225 10E3/UL (ref 150–450)
PLATELET # BLD AUTO: 250 10E3/UL (ref 150–450)
PLATELET # BLD AUTO: 257 10E3/UL (ref 150–450)
PLATELET # BLD AUTO: 270 10E3/UL (ref 150–450)
PLATELET # BLD AUTO: 270 10E3/UL (ref 150–450)
PO2 BLDV: 23 MM HG (ref 25–47)
POTASSIUM BLD-SCNC: 3.3 MMOL/L (ref 3.5–5)
POTASSIUM BLD-SCNC: 4.1 MMOL/L (ref 3.5–5)
POTASSIUM BLD-SCNC: 4.1 MMOL/L (ref 3.5–5)
POTASSIUM SERPL-SCNC: 4.5 MMOL/L (ref 3.4–5.3)
POTASSIUM SERPL-SCNC: 5 MMOL/L (ref 3.4–5.3)
PR INTERVAL - MUSE: 164 MS
PROCALCITONIN SERPL-MCNC: 0.11 NG/ML (ref 0–0.49)
PROT SERPL-MCNC: 6 G/DL (ref 6–8)
PROT SERPL-MCNC: 6.1 G/DL (ref 6.4–8.3)
PROT SERPL-MCNC: 6.4 G/DL (ref 6.4–8.3)
QRS DURATION - MUSE: 116 MS
QT - MUSE: 420 MS
QTC - MUSE: 478 MS
R AXIS - MUSE: -52 DEGREES
RBC # BLD AUTO: 3.12 10E6/UL (ref 4.4–5.9)
RBC # BLD AUTO: 3.48 10E6/UL (ref 4.4–5.9)
RBC # BLD AUTO: 3.94 10E6/UL (ref 4.4–5.9)
RBC # BLD AUTO: 4.13 10E6/UL (ref 4.4–5.9)
RBC URINE: 8 /HPF
SAO2 % BLDV: 40.1 % (ref 70–75)
SARS-COV-2 RNA RESP QL NAA+PROBE: NEGATIVE
SODIUM SERPL-SCNC: 140 MMOL/L (ref 136–145)
SODIUM SERPL-SCNC: 143 MMOL/L (ref 136–145)
SODIUM SERPL-SCNC: 143 MMOL/L (ref 136–145)
SODIUM SERPL-SCNC: 147 MMOL/L (ref 136–145)
SODIUM SERPL-SCNC: 149 MMOL/L (ref 136–145)
SP GR UR STRIP: 1.02 (ref 1–1.03)
SYSTOLIC BLOOD PRESSURE - MUSE: 133 MMHG
T AXIS - MUSE: 40 DEGREES
TRIGL SERPL-MCNC: 116 MG/DL
TROPONIN I SERPL-MCNC: 0.02 NG/ML (ref 0–0.29)
TSH SERPL DL<=0.005 MIU/L-ACNC: 1.81 UIU/ML (ref 0.3–4.2)
UROBILINOGEN UR STRIP-MCNC: 2 MG/DL
VENTRICULAR RATE- MUSE: 78 BPM
VIT B12 SERPL-MCNC: 790 PG/ML (ref 232–1245)
WBC # BLD AUTO: 5.7 10E3/UL (ref 4–11)
WBC # BLD AUTO: 5.8 10E3/UL (ref 4–11)
WBC # BLD AUTO: 7.4 10E3/UL (ref 4–11)
WBC # BLD AUTO: 7.6 10E3/UL (ref 4–11)
WBC URINE: 2 /HPF

## 2023-01-01 PROCEDURE — 999N000147 HC STATISTIC PT IP EVAL DEFER: Performed by: PHYSICAL THERAPIST

## 2023-01-01 PROCEDURE — 85025 COMPLETE CBC W/AUTO DIFF WBC: CPT | Performed by: EMERGENCY MEDICINE

## 2023-01-01 PROCEDURE — 93005 ELECTROCARDIOGRAM TRACING: CPT | Performed by: EMERGENCY MEDICINE

## 2023-01-01 PROCEDURE — 36415 COLL VENOUS BLD VENIPUNCTURE: CPT | Mod: ORL | Performed by: FAMILY MEDICINE

## 2023-01-01 PROCEDURE — 999N000157 HC STATISTIC RCP TIME EA 10 MIN

## 2023-01-01 PROCEDURE — 83550 IRON BINDING TEST: CPT | Performed by: FAMILY MEDICINE

## 2023-01-01 PROCEDURE — G0378 HOSPITAL OBSERVATION PER HR: HCPCS

## 2023-01-01 PROCEDURE — 83605 ASSAY OF LACTIC ACID: CPT | Performed by: EMERGENCY MEDICINE

## 2023-01-01 PROCEDURE — 250N000013 HC RX MED GY IP 250 OP 250 PS 637: Performed by: FAMILY MEDICINE

## 2023-01-01 PROCEDURE — 99285 EMERGENCY DEPT VISIT HI MDM: CPT | Mod: 25

## 2023-01-01 PROCEDURE — 85027 COMPLETE CBC AUTOMATED: CPT | Performed by: FAMILY MEDICINE

## 2023-01-01 PROCEDURE — 85027 COMPLETE CBC AUTOMATED: CPT | Mod: ORL | Performed by: FAMILY MEDICINE

## 2023-01-01 PROCEDURE — 83036 HEMOGLOBIN GLYCOSYLATED A1C: CPT | Mod: ORL | Performed by: FAMILY MEDICINE

## 2023-01-01 PROCEDURE — 250N000011 HC RX IP 250 OP 636: Mod: JZ | Performed by: FAMILY MEDICINE

## 2023-01-01 PROCEDURE — 80048 BASIC METABOLIC PNL TOTAL CA: CPT | Performed by: FAMILY MEDICINE

## 2023-01-01 PROCEDURE — 83880 ASSAY OF NATRIURETIC PEPTIDE: CPT | Performed by: FAMILY MEDICINE

## 2023-01-01 PROCEDURE — 85049 AUTOMATED PLATELET COUNT: CPT | Performed by: FAMILY MEDICINE

## 2023-01-01 PROCEDURE — 82746 ASSAY OF FOLIC ACID SERUM: CPT | Mod: ORL | Performed by: FAMILY MEDICINE

## 2023-01-01 PROCEDURE — 258N000003 HC RX IP 258 OP 636: Performed by: FAMILY MEDICINE

## 2023-01-01 PROCEDURE — 250N000009 HC RX 250: Performed by: FAMILY MEDICINE

## 2023-01-01 PROCEDURE — 99207 PR NO BILLABLE SERVICE THIS VISIT: CPT | Performed by: EMERGENCY MEDICINE

## 2023-01-01 PROCEDURE — 258N000003 HC RX IP 258 OP 636: Performed by: EMERGENCY MEDICINE

## 2023-01-01 PROCEDURE — 80053 COMPREHEN METABOLIC PANEL: CPT | Performed by: EMERGENCY MEDICINE

## 2023-01-01 PROCEDURE — 99233 SBSQ HOSP IP/OBS HIGH 50: CPT | Performed by: FAMILY MEDICINE

## 2023-01-01 PROCEDURE — 82306 VITAMIN D 25 HYDROXY: CPT | Mod: ORL | Performed by: FAMILY MEDICINE

## 2023-01-01 PROCEDURE — G1010 CDSM STANSON: HCPCS

## 2023-01-01 PROCEDURE — 99212 OFFICE O/P EST SF 10 MIN: CPT | Mod: 95 | Performed by: NURSE PRACTITIONER

## 2023-01-01 PROCEDURE — 250N000011 HC RX IP 250 OP 636: Performed by: EMERGENCY MEDICINE

## 2023-01-01 PROCEDURE — 36415 COLL VENOUS BLD VENIPUNCTURE: CPT | Performed by: EMERGENCY MEDICINE

## 2023-01-01 PROCEDURE — 80053 COMPREHEN METABOLIC PANEL: CPT | Mod: ORL | Performed by: FAMILY MEDICINE

## 2023-01-01 PROCEDURE — 36415 COLL VENOUS BLD VENIPUNCTURE: CPT | Performed by: FAMILY MEDICINE

## 2023-01-01 PROCEDURE — 71045 X-RAY EXAM CHEST 1 VIEW: CPT

## 2023-01-01 PROCEDURE — 94640 AIRWAY INHALATION TREATMENT: CPT

## 2023-01-01 PROCEDURE — 258N000001 HC RX 258: Performed by: FAMILY MEDICINE

## 2023-01-01 PROCEDURE — 99223 1ST HOSP IP/OBS HIGH 75: CPT | Performed by: FAMILY MEDICINE

## 2023-01-01 PROCEDURE — P9603 ONE-WAY ALLOW PRORATED MILES: HCPCS | Mod: ORL | Performed by: FAMILY MEDICINE

## 2023-01-01 PROCEDURE — 84145 PROCALCITONIN (PCT): CPT | Performed by: EMERGENCY MEDICINE

## 2023-01-01 PROCEDURE — 255N000002 HC RX 255 OP 636: Performed by: FAMILY MEDICINE

## 2023-01-01 PROCEDURE — 120N000001 HC R&B MED SURG/OB

## 2023-01-01 PROCEDURE — 97166 OT EVAL MOD COMPLEX 45 MIN: CPT | Mod: GO

## 2023-01-01 PROCEDURE — 250N000009 HC RX 250: Performed by: EMERGENCY MEDICINE

## 2023-01-01 PROCEDURE — 250N000013 HC RX MED GY IP 250 OP 250 PS 637: Performed by: HOSPITALIST

## 2023-01-01 PROCEDURE — 71046 X-RAY EXAM CHEST 2 VIEWS: CPT

## 2023-01-01 PROCEDURE — 82962 GLUCOSE BLOOD TEST: CPT

## 2023-01-01 PROCEDURE — 84443 ASSAY THYROID STIM HORMONE: CPT | Mod: ORL | Performed by: FAMILY MEDICINE

## 2023-01-01 PROCEDURE — 85610 PROTHROMBIN TIME: CPT | Performed by: EMERGENCY MEDICINE

## 2023-01-01 PROCEDURE — 87086 URINE CULTURE/COLONY COUNT: CPT | Performed by: EMERGENCY MEDICINE

## 2023-01-01 PROCEDURE — 250N000013 HC RX MED GY IP 250 OP 250 PS 637: Performed by: EMERGENCY MEDICINE

## 2023-01-01 PROCEDURE — 99232 SBSQ HOSP IP/OBS MODERATE 35: CPT | Performed by: FAMILY MEDICINE

## 2023-01-01 PROCEDURE — 72100 X-RAY EXAM L-S SPINE 2/3 VWS: CPT

## 2023-01-01 PROCEDURE — 73610 X-RAY EXAM OF ANKLE: CPT | Mod: LT

## 2023-01-01 PROCEDURE — 87040 BLOOD CULTURE FOR BACTERIA: CPT | Performed by: EMERGENCY MEDICINE

## 2023-01-01 PROCEDURE — 97535 SELF CARE MNGMENT TRAINING: CPT | Mod: GO

## 2023-01-01 PROCEDURE — 80061 LIPID PANEL: CPT | Mod: ORL | Performed by: FAMILY MEDICINE

## 2023-01-01 PROCEDURE — 83880 ASSAY OF NATRIURETIC PEPTIDE: CPT | Mod: ORL | Performed by: FAMILY MEDICINE

## 2023-01-01 PROCEDURE — 250N000011 HC RX IP 250 OP 636: Performed by: FAMILY MEDICINE

## 2023-01-01 PROCEDURE — 84484 ASSAY OF TROPONIN QUANT: CPT | Performed by: EMERGENCY MEDICINE

## 2023-01-01 PROCEDURE — U0005 INFEC AGEN DETEC AMPLI PROBE: HCPCS | Mod: ORL | Performed by: FAMILY MEDICINE

## 2023-01-01 PROCEDURE — 94640 AIRWAY INHALATION TREATMENT: CPT | Mod: 76

## 2023-01-01 PROCEDURE — 93306 TTE W/DOPPLER COMPLETE: CPT | Mod: 26 | Performed by: INTERNAL MEDICINE

## 2023-01-01 PROCEDURE — 99239 HOSP IP/OBS DSCHRG MGMT >30: CPT | Performed by: FAMILY MEDICINE

## 2023-01-01 PROCEDURE — 82805 BLOOD GASES W/O2 SATURATION: CPT | Performed by: EMERGENCY MEDICINE

## 2023-01-01 PROCEDURE — 81001 URINALYSIS AUTO W/SCOPE: CPT | Performed by: EMERGENCY MEDICINE

## 2023-01-01 PROCEDURE — 99316 NF DSCHRG MGMT 30 MIN+: CPT | Performed by: PHYSICIAN ASSISTANT

## 2023-01-01 PROCEDURE — 99238 HOSP IP/OBS DSCHRG MGMT 30/<: CPT | Performed by: EMERGENCY MEDICINE

## 2023-01-01 PROCEDURE — 82607 VITAMIN B-12: CPT | Mod: ORL | Performed by: FAMILY MEDICINE

## 2023-01-01 RX ORDER — AZITHROMYCIN 250 MG/1
250 TABLET, FILM COATED ORAL DAILY
Status: DISCONTINUED | OUTPATIENT
Start: 2023-01-01 | End: 2023-01-01

## 2023-01-01 RX ORDER — LORAZEPAM 2 MG/ML
0.5 CONCENTRATE ORAL EVERY 4 HOURS PRN
Qty: 15 ML | Refills: 0 | Status: SHIPPED | OUTPATIENT
Start: 2023-01-01 | End: 2023-01-01

## 2023-01-01 RX ORDER — ACETAMINOPHEN 500 MG
1000 TABLET ORAL 3 TIMES DAILY PRN
Status: DISCONTINUED | OUTPATIENT
Start: 2023-01-01 | End: 2023-01-01 | Stop reason: HOSPADM

## 2023-01-01 RX ORDER — MULTIVIT WITH MINERALS/LUTEIN
1 TABLET ORAL DAILY
COMMUNITY
Start: 2023-01-01

## 2023-01-01 RX ORDER — CALCIUM CARBONATE 500 MG/1
500 TABLET, CHEWABLE ORAL DAILY
Status: DISCONTINUED | OUTPATIENT
Start: 2023-10-01 | End: 2023-01-01

## 2023-01-01 RX ORDER — MULTIVIT-MIN/FA/LYCOPEN/LUTEIN .4-300-25
1 TABLET ORAL DAILY
Status: ON HOLD | COMMUNITY
End: 2023-01-01

## 2023-01-01 RX ORDER — SODIUM CHLORIDE 9 MG/ML
INJECTION, SOLUTION INTRAVENOUS CONTINUOUS
Status: DISCONTINUED | OUTPATIENT
Start: 2023-01-01 | End: 2023-01-01

## 2023-01-01 RX ORDER — DEXTROSE MONOHYDRATE 25 G/50ML
50 INJECTION, SOLUTION INTRAVENOUS ONCE
Status: COMPLETED | OUTPATIENT
Start: 2023-01-01 | End: 2023-01-01

## 2023-01-01 RX ORDER — MULTIVITAMIN,THERAPEUTIC
1 TABLET ORAL DAILY
Status: DISCONTINUED | OUTPATIENT
Start: 2023-01-01 | End: 2023-01-01 | Stop reason: HOSPADM

## 2023-01-01 RX ORDER — ONDANSETRON 2 MG/ML
4 INJECTION INTRAMUSCULAR; INTRAVENOUS EVERY 6 HOURS PRN
Status: DISCONTINUED | OUTPATIENT
Start: 2023-01-01 | End: 2023-01-01 | Stop reason: HOSPADM

## 2023-01-01 RX ORDER — DOXYCYCLINE 100 MG/1
100 CAPSULE ORAL EVERY 12 HOURS
Status: DISCONTINUED | OUTPATIENT
Start: 2023-01-01 | End: 2023-01-01 | Stop reason: HOSPADM

## 2023-01-01 RX ORDER — VIT C/E/ZN/COPPR/LUTEIN/ZEAXAN 60 MG-6 MG
1 CAPSULE ORAL 2 TIMES DAILY
Status: DISCONTINUED | OUTPATIENT
Start: 2023-01-01 | End: 2023-01-01 | Stop reason: HOSPADM

## 2023-01-01 RX ORDER — LORAZEPAM 0.5 MG/1
0.5 TABLET ORAL EVERY 6 HOURS PRN
Status: DISCONTINUED | OUTPATIENT
Start: 2023-01-01 | End: 2023-01-01 | Stop reason: HOSPADM

## 2023-01-01 RX ORDER — SODIUM CHLORIDE 9 MG/ML
INJECTION, SOLUTION INTRAVENOUS CONTINUOUS
Status: ACTIVE | OUTPATIENT
Start: 2023-01-01 | End: 2023-01-01

## 2023-01-01 RX ORDER — LORAZEPAM 2 MG/ML
0.5 CONCENTRATE ORAL EVERY 4 HOURS PRN
Status: DISCONTINUED | OUTPATIENT
Start: 2023-01-01 | End: 2023-01-01 | Stop reason: HOSPADM

## 2023-01-01 RX ORDER — VIT C/E/ZN/COPPR/LUTEIN/ZEAXAN 60 MG-6 MG
CAPSULE ORAL 2 TIMES DAILY
Status: DISCONTINUED | OUTPATIENT
Start: 2023-01-01 | End: 2023-01-01 | Stop reason: HOSPADM

## 2023-01-01 RX ORDER — HALOPERIDOL 2 MG/ML
1 SOLUTION ORAL
Qty: 15 ML | Refills: 0 | Status: SHIPPED | OUTPATIENT
Start: 2023-01-01 | End: 2023-01-01

## 2023-01-01 RX ORDER — FLUDROCORTISONE ACETATE 0.1 MG/1
0.2 TABLET ORAL DAILY
COMMUNITY

## 2023-01-01 RX ORDER — POTASSIUM CHLORIDE 1.5 G/1.58G
20 POWDER, FOR SOLUTION ORAL 2 TIMES DAILY
Status: DISCONTINUED | OUTPATIENT
Start: 2023-01-01 | End: 2023-01-01 | Stop reason: HOSPADM

## 2023-01-01 RX ORDER — ASPIRIN 81 MG/1
81 TABLET ORAL DAILY
COMMUNITY
Start: 2023-01-01

## 2023-01-01 RX ORDER — AZATHIOPRINE 50 MG/1
50 TABLET ORAL DAILY
COMMUNITY
Start: 2023-01-01

## 2023-01-01 RX ORDER — DOXYCYCLINE 100 MG/1
100 CAPSULE ORAL EVERY 12 HOURS SCHEDULED
Status: DISCONTINUED | OUTPATIENT
Start: 2023-01-01 | End: 2023-01-01 | Stop reason: HOSPADM

## 2023-01-01 RX ORDER — POLYETHYLENE GLYCOL 3350 17 G/17G
17 POWDER, FOR SOLUTION ORAL DAILY
Status: DISCONTINUED | OUTPATIENT
Start: 2023-01-01 | End: 2023-01-01 | Stop reason: HOSPADM

## 2023-01-01 RX ORDER — FLUDROCORTISONE ACETATE 0.1 MG/1
0.2 TABLET ORAL DAILY
Status: DISCONTINUED | OUTPATIENT
Start: 2023-01-01 | End: 2023-01-01 | Stop reason: HOSPADM

## 2023-01-01 RX ORDER — CALCIUM CARBONATE 500 MG/1
500 TABLET, CHEWABLE ORAL DAILY
Status: DISCONTINUED | OUTPATIENT
Start: 2023-01-01 | End: 2023-01-01 | Stop reason: HOSPADM

## 2023-01-01 RX ORDER — POLYETHYLENE GLYCOL 3350 17 G/17G
17 POWDER, FOR SOLUTION ORAL DAILY
Qty: 238 G | DISCHARGE
Start: 2023-01-01

## 2023-01-01 RX ORDER — OLANZAPINE 5 MG/1
5 TABLET ORAL AT BEDTIME
Status: DISCONTINUED | OUTPATIENT
Start: 2023-01-01 | End: 2023-01-01 | Stop reason: HOSPADM

## 2023-01-01 RX ORDER — CARBIDOPA AND LEVODOPA 25; 100 MG/1; MG/1
1 TABLET ORAL 3 TIMES DAILY
Status: DISCONTINUED | OUTPATIENT
Start: 2023-01-01 | End: 2023-01-01 | Stop reason: HOSPADM

## 2023-01-01 RX ORDER — OLANZAPINE 2.5 MG/1
2.5 TABLET, FILM COATED ORAL 3 TIMES DAILY PRN
Status: DISCONTINUED | OUTPATIENT
Start: 2023-01-01 | End: 2023-01-01 | Stop reason: HOSPADM

## 2023-01-01 RX ORDER — HEPARIN SODIUM 5000 [USP'U]/.5ML
5000 INJECTION, SOLUTION INTRAVENOUS; SUBCUTANEOUS EVERY 12 HOURS
Status: DISCONTINUED | OUTPATIENT
Start: 2023-01-01 | End: 2023-01-01

## 2023-01-01 RX ORDER — LORAZEPAM 2 MG/ML
0.5 INJECTION INTRAMUSCULAR EVERY 4 HOURS PRN
Status: DISCONTINUED | OUTPATIENT
Start: 2023-01-01 | End: 2023-01-01

## 2023-01-01 RX ORDER — LIDOCAINE 40 MG/G
CREAM TOPICAL
Status: DISCONTINUED | OUTPATIENT
Start: 2023-01-01 | End: 2023-01-01 | Stop reason: HOSPADM

## 2023-01-01 RX ORDER — LORAZEPAM 2 MG/ML
0.5 INJECTION INTRAMUSCULAR EVERY 4 HOURS PRN
Status: DISCONTINUED | OUTPATIENT
Start: 2023-01-01 | End: 2023-01-01 | Stop reason: HOSPADM

## 2023-01-01 RX ORDER — ATORVASTATIN CALCIUM 10 MG/1
10 TABLET, FILM COATED ORAL EVERY EVENING
Status: DISCONTINUED | OUTPATIENT
Start: 2023-01-01 | End: 2023-01-01 | Stop reason: HOSPADM

## 2023-01-01 RX ORDER — CEFTRIAXONE 1 G/1
1 INJECTION, POWDER, FOR SOLUTION INTRAMUSCULAR; INTRAVENOUS ONCE
Status: COMPLETED | OUTPATIENT
Start: 2023-01-01 | End: 2023-01-01

## 2023-01-01 RX ORDER — ACETAMINOPHEN 500 MG
1000 TABLET ORAL 3 TIMES DAILY PRN
COMMUNITY

## 2023-01-01 RX ORDER — DONEPEZIL HYDROCHLORIDE 5 MG/1
5 TABLET, FILM COATED ORAL AT BEDTIME
Status: DISCONTINUED | OUTPATIENT
Start: 2023-01-01 | End: 2023-01-01 | Stop reason: HOSPADM

## 2023-01-01 RX ORDER — ASPIRIN 81 MG/1
81 TABLET ORAL DAILY
Status: DISCONTINUED | OUTPATIENT
Start: 2023-01-01 | End: 2023-01-01 | Stop reason: HOSPADM

## 2023-01-01 RX ORDER — DOXYCYCLINE 100 MG/1
100 CAPSULE ORAL EVERY 12 HOURS
Qty: 12 CAPSULE | Refills: 0 | DISCHARGE
Start: 2023-01-01 | End: 2023-01-01

## 2023-01-01 RX ORDER — FOLIC ACID 1 MG/1
1 TABLET ORAL DAILY
COMMUNITY
Start: 2023-01-01

## 2023-01-01 RX ORDER — AZITHROMYCIN 500 MG/5ML
500 INJECTION, POWDER, LYOPHILIZED, FOR SOLUTION INTRAVENOUS EVERY 24 HOURS
Status: DISCONTINUED | OUTPATIENT
Start: 2023-01-01 | End: 2023-01-01

## 2023-01-01 RX ORDER — OLANZAPINE 2.5 MG/1
2.5 TABLET, FILM COATED ORAL 3 TIMES DAILY PRN
DISCHARGE
Start: 2023-01-01

## 2023-01-01 RX ORDER — ONDANSETRON 4 MG/1
4 TABLET, ORALLY DISINTEGRATING ORAL EVERY 6 HOURS PRN
Status: DISCONTINUED | OUTPATIENT
Start: 2023-01-01 | End: 2023-01-01 | Stop reason: HOSPADM

## 2023-01-01 RX ORDER — IPRATROPIUM BROMIDE AND ALBUTEROL SULFATE 2.5; .5 MG/3ML; MG/3ML
3 SOLUTION RESPIRATORY (INHALATION)
Status: DISCONTINUED | OUTPATIENT
Start: 2023-01-01 | End: 2023-01-01 | Stop reason: HOSPADM

## 2023-01-01 RX ORDER — HALOPERIDOL 5 MG/ML
0.5 INJECTION INTRAMUSCULAR
Status: DISCONTINUED | OUTPATIENT
Start: 2023-01-01 | End: 2023-01-01

## 2023-01-01 RX ORDER — IPRATROPIUM BROMIDE AND ALBUTEROL SULFATE 2.5; .5 MG/3ML; MG/3ML
3 SOLUTION RESPIRATORY (INHALATION)
Status: DISCONTINUED | OUTPATIENT
Start: 2023-01-01 | End: 2023-01-01

## 2023-01-01 RX ORDER — ASPIRIN 81 MG/1
81 TABLET, CHEWABLE ORAL EVERY EVENING
Status: DISCONTINUED | OUTPATIENT
Start: 2023-01-01 | End: 2023-01-01 | Stop reason: HOSPADM

## 2023-01-01 RX ORDER — HALOPERIDOL 5 MG/ML
0.5 INJECTION INTRAMUSCULAR EVERY 4 HOURS PRN
Status: DISCONTINUED | OUTPATIENT
Start: 2023-01-01 | End: 2023-01-01 | Stop reason: HOSPADM

## 2023-01-01 RX ORDER — AMOXICILLIN 250 MG
2 CAPSULE ORAL 2 TIMES DAILY PRN
Status: DISCONTINUED | OUTPATIENT
Start: 2023-01-01 | End: 2023-01-01 | Stop reason: HOSPADM

## 2023-01-01 RX ORDER — CEFTRIAXONE 1 G/1
1 INJECTION, POWDER, FOR SOLUTION INTRAMUSCULAR; INTRAVENOUS EVERY 24 HOURS
Status: DISCONTINUED | OUTPATIENT
Start: 2023-01-01 | End: 2023-01-01

## 2023-01-01 RX ORDER — POTASSIUM CHLORIDE 1.5 G/1.58G
20 POWDER, FOR SOLUTION ORAL 2 TIMES DAILY
Qty: 4 PACKET | Refills: 0 | DISCHARGE
Start: 2023-01-01 | End: 2023-01-01

## 2023-01-01 RX ORDER — CALCIUM CARBONATE 500(1250)
500 TABLET ORAL DAILY
Status: DISCONTINUED | OUTPATIENT
Start: 2023-01-01 | End: 2023-01-01 | Stop reason: HOSPADM

## 2023-01-01 RX ORDER — LORAZEPAM 2 MG/ML
0.5 CONCENTRATE ORAL EVERY 4 HOURS PRN
Status: DISCONTINUED | OUTPATIENT
Start: 2023-01-01 | End: 2023-01-01

## 2023-01-01 RX ORDER — LEVOFLOXACIN 750 MG/1
750 TABLET, FILM COATED ORAL EVERY 24 HOURS
Status: DISCONTINUED | OUTPATIENT
Start: 2023-01-01 | End: 2023-01-01

## 2023-01-01 RX ORDER — AZITHROMYCIN 500 MG/5ML
500 INJECTION, POWDER, LYOPHILIZED, FOR SOLUTION INTRAVENOUS ONCE
Status: COMPLETED | OUTPATIENT
Start: 2023-01-01 | End: 2023-01-01

## 2023-01-01 RX ORDER — ATORVASTATIN CALCIUM 10 MG/1
10 TABLET, FILM COATED ORAL EVERY EVENING
Status: DISCONTINUED | OUTPATIENT
Start: 2023-01-01 | End: 2023-01-01

## 2023-01-01 RX ORDER — OLANZAPINE 5 MG/1
5 TABLET ORAL AT BEDTIME
COMMUNITY

## 2023-01-01 RX ORDER — HYDRALAZINE HYDROCHLORIDE 20 MG/ML
5 INJECTION INTRAMUSCULAR; INTRAVENOUS EVERY 6 HOURS PRN
Status: DISCONTINUED | OUTPATIENT
Start: 2023-01-01 | End: 2023-01-01

## 2023-01-01 RX ORDER — DONEPEZIL HYDROCHLORIDE 5 MG/1
5 TABLET, FILM COATED ORAL AT BEDTIME
DISCHARGE
Start: 2023-01-01

## 2023-01-01 RX ORDER — FLUDROCORTISONE ACETATE 0.1 MG/1
0.2 TABLET ORAL DAILY
Status: DISCONTINUED | OUTPATIENT
Start: 2023-10-01 | End: 2023-01-01

## 2023-01-01 RX ORDER — LIDOCAINE 50 MG/G
OINTMENT TOPICAL 2 TIMES DAILY PRN
Status: ON HOLD | COMMUNITY
End: 2023-01-01

## 2023-01-01 RX ORDER — FOLIC ACID 1 MG/1
1 TABLET ORAL DAILY
Status: DISCONTINUED | OUTPATIENT
Start: 2023-01-01 | End: 2023-01-01 | Stop reason: HOSPADM

## 2023-01-01 RX ORDER — LORAZEPAM 0.5 MG/1
0.5 TABLET ORAL EVERY 6 HOURS PRN
Qty: 10 TABLET | Refills: 0 | Status: SHIPPED | OUTPATIENT
Start: 2023-01-01

## 2023-01-01 RX ORDER — HALOPERIDOL 2 MG/ML
1 SOLUTION ORAL
Status: DISCONTINUED | OUTPATIENT
Start: 2023-01-01 | End: 2023-01-01

## 2023-01-01 RX ORDER — ATORVASTATIN CALCIUM 10 MG/1
10 TABLET, FILM COATED ORAL EVERY EVENING
COMMUNITY
Start: 2023-01-01

## 2023-01-01 RX ADMIN — FLUDROCORTISONE ACETATE 0.2 MG: 0.1 TABLET ORAL at 08:11

## 2023-01-01 RX ADMIN — HYDRALAZINE HYDROCHLORIDE 5 MG: 20 INJECTION, SOLUTION INTRAMUSCULAR; INTRAVENOUS at 16:46

## 2023-01-01 RX ADMIN — CARBIDOPA AND LEVODOPA 1 TABLET: 25; 100 TABLET ORAL at 20:03

## 2023-01-01 RX ADMIN — OLANZAPINE 2.5 MG: 5 TABLET, FILM COATED ORAL at 09:53

## 2023-01-01 RX ADMIN — CEFTRIAXONE 1 G: 1 INJECTION, POWDER, FOR SOLUTION INTRAMUSCULAR; INTRAVENOUS at 15:17

## 2023-01-01 RX ADMIN — HEPARIN SODIUM 5000 UNITS: 5000 INJECTION, SOLUTION INTRAVENOUS; SUBCUTANEOUS at 05:52

## 2023-01-01 RX ADMIN — ASPIRIN 81 MG: 81 TABLET, COATED ORAL at 08:43

## 2023-01-01 RX ADMIN — OLANZAPINE 5 MG: 5 TABLET, FILM COATED ORAL at 22:49

## 2023-01-01 RX ADMIN — ACETAMINOPHEN 1000 MG: 500 TABLET ORAL at 04:19

## 2023-01-01 RX ADMIN — POTASSIUM CHLORIDE 20 MEQ: 1.5 FOR SOLUTION ORAL at 11:16

## 2023-01-01 RX ADMIN — Medication 1 MG: at 04:19

## 2023-01-01 RX ADMIN — CALCIUM CARBONATE (ANTACID) CHEW TAB 500 MG 500 MG: 500 CHEW TAB at 08:43

## 2023-01-01 RX ADMIN — ATORVASTATIN CALCIUM 10 MG: 10 TABLET, FILM COATED ORAL at 20:21

## 2023-01-01 RX ADMIN — AZITHROMYCIN MONOHYDRATE 500 MG: 500 INJECTION, POWDER, LYOPHILIZED, FOR SOLUTION INTRAVENOUS at 20:17

## 2023-01-01 RX ADMIN — LORAZEPAM 0.5 MG: 2 INJECTION INTRAMUSCULAR; INTRAVENOUS at 20:07

## 2023-01-01 RX ADMIN — POLYETHYLENE GLYCOL 3350 17 G: 17 POWDER, FOR SOLUTION ORAL at 08:11

## 2023-01-01 RX ADMIN — DOXYCYCLINE 100 MG: 100 CAPSULE ORAL at 09:30

## 2023-01-01 RX ADMIN — CARBIDOPA AND LEVODOPA 1 TABLET: 25; 100 TABLET ORAL at 08:43

## 2023-01-01 RX ADMIN — PERFLUTREN 2 ML: 6.52 INJECTION, SUSPENSION INTRAVENOUS at 15:54

## 2023-01-01 RX ADMIN — DOXYCYCLINE 100 MG: 100 CAPSULE ORAL at 08:43

## 2023-01-01 RX ADMIN — CARBIDOPA AND LEVODOPA 1 TABLET: 25; 100 TABLET ORAL at 16:56

## 2023-01-01 RX ADMIN — CARBIDOPA AND LEVODOPA 1 TABLET: 25; 100 TABLET ORAL at 13:51

## 2023-01-01 RX ADMIN — OLANZAPINE 5 MG: 5 TABLET, FILM COATED ORAL at 21:50

## 2023-01-01 RX ADMIN — DOXYCYCLINE 100 MG: 100 CAPSULE ORAL at 20:08

## 2023-01-01 RX ADMIN — IPRATROPIUM BROMIDE AND ALBUTEROL SULFATE 3 ML: .5; 3 SOLUTION RESPIRATORY (INHALATION) at 11:14

## 2023-01-01 RX ADMIN — LORAZEPAM 0.5 MG: 2 INJECTION INTRAMUSCULAR; INTRAVENOUS at 02:22

## 2023-01-01 RX ADMIN — CARBIDOPA AND LEVODOPA 1 TABLET: 25; 100 TABLET ORAL at 22:48

## 2023-01-01 RX ADMIN — IPRATROPIUM BROMIDE AND ALBUTEROL SULFATE 3 ML: .5; 3 SOLUTION RESPIRATORY (INHALATION) at 08:37

## 2023-01-01 RX ADMIN — AZITHROMYCIN MONOHYDRATE 500 MG: 500 INJECTION, POWDER, LYOPHILIZED, FOR SOLUTION INTRAVENOUS at 16:56

## 2023-01-01 RX ADMIN — HEPARIN SODIUM 5000 UNITS: 5000 INJECTION, SOLUTION INTRAVENOUS; SUBCUTANEOUS at 18:34

## 2023-01-01 RX ADMIN — POTASSIUM CHLORIDE 20 MEQ: 1.5 POWDER, FOR SOLUTION ORAL at 08:43

## 2023-01-01 RX ADMIN — HALOPERIDOL LACTATE 0.5 MG: 5 INJECTION, SOLUTION INTRAMUSCULAR at 23:42

## 2023-01-01 RX ADMIN — OLANZAPINE 5 MG: 5 TABLET, FILM COATED ORAL at 22:23

## 2023-01-01 RX ADMIN — HALOPERIDOL LACTATE 0.5 MG: 5 INJECTION, SOLUTION INTRAMUSCULAR at 17:23

## 2023-01-01 RX ADMIN — IPRATROPIUM BROMIDE AND ALBUTEROL SULFATE 3 ML: .5; 3 SOLUTION RESPIRATORY (INHALATION) at 07:17

## 2023-01-01 RX ADMIN — CARBIDOPA AND LEVODOPA 1 TABLET: 25; 100 TABLET ORAL at 20:08

## 2023-01-01 RX ADMIN — Medication 1 CAPSULE: at 20:21

## 2023-01-01 RX ADMIN — DONEPEZIL HYDROCHLORIDE 5 MG: 5 TABLET, FILM COATED ORAL at 21:50

## 2023-01-01 RX ADMIN — CARBIDOPA AND LEVODOPA 1 TABLET: 25; 100 TABLET ORAL at 13:46

## 2023-01-01 RX ADMIN — LORAZEPAM 0.5 MG: 2 INJECTION INTRAMUSCULAR; INTRAVENOUS at 18:33

## 2023-01-01 RX ADMIN — Medication 0.5 MG: at 15:00

## 2023-01-01 RX ADMIN — POTASSIUM CHLORIDE 20 MEQ: 1.5 POWDER, FOR SOLUTION ORAL at 20:09

## 2023-01-01 RX ADMIN — Medication 0.5 MG: at 20:22

## 2023-01-01 RX ADMIN — Medication 1 CAPSULE: at 20:03

## 2023-01-01 RX ADMIN — CARBIDOPA AND LEVODOPA 1 TABLET: 25; 100 TABLET ORAL at 13:55

## 2023-01-01 RX ADMIN — CARBIDOPA AND LEVODOPA 1 TABLET: 25; 100 TABLET ORAL at 20:21

## 2023-01-01 RX ADMIN — CARBIDOPA AND LEVODOPA 1 TABLET: 25; 100 TABLET ORAL at 08:11

## 2023-01-01 RX ADMIN — OLANZAPINE 5 MG: 5 TABLET, FILM COATED ORAL at 21:13

## 2023-01-01 RX ADMIN — ATORVASTATIN CALCIUM 10 MG: 10 TABLET, FILM COATED ORAL at 20:08

## 2023-01-01 RX ADMIN — POLYETHYLENE GLYCOL 3350 17 G: 17 POWDER, FOR SOLUTION ORAL at 16:56

## 2023-01-01 RX ADMIN — Medication 1 CAPSULE: at 11:24

## 2023-01-01 RX ADMIN — LORAZEPAM 0.5 MG: 2 INJECTION INTRAMUSCULAR; INTRAVENOUS at 15:31

## 2023-01-01 RX ADMIN — LORAZEPAM 0.5 MG: 0.5 TABLET ORAL at 01:35

## 2023-01-01 RX ADMIN — ASPIRIN 81 MG CHEWABLE TABLET 81 MG: 81 TABLET CHEWABLE at 20:21

## 2023-01-01 RX ADMIN — SODIUM CHLORIDE: 9 INJECTION, SOLUTION INTRAVENOUS at 16:56

## 2023-01-01 RX ADMIN — HEPARIN SODIUM 5000 UNITS: 5000 INJECTION, SOLUTION INTRAVENOUS; SUBCUTANEOUS at 07:29

## 2023-01-01 RX ADMIN — DEXTROSE MONOHYDRATE 50 ML: 25 INJECTION, SOLUTION INTRAVENOUS at 14:42

## 2023-01-01 RX ADMIN — DEXTROSE AND SODIUM CHLORIDE: 5; 450 INJECTION, SOLUTION INTRAVENOUS at 14:48

## 2023-01-01 RX ADMIN — ASPIRIN 81 MG: 81 TABLET, COATED ORAL at 20:08

## 2023-01-01 RX ADMIN — CEFTRIAXONE 1 G: 1 INJECTION, POWDER, FOR SOLUTION INTRAMUSCULAR; INTRAVENOUS at 16:31

## 2023-01-01 RX ADMIN — HEPARIN SODIUM 5000 UNITS: 5000 INJECTION, SOLUTION INTRAVENOUS; SUBCUTANEOUS at 19:49

## 2023-01-01 RX ADMIN — MICONAZOLE NITRATE: 2 POWDER TOPICAL at 03:40

## 2023-01-01 RX ADMIN — ASPIRIN 81 MG CHEWABLE TABLET 81 MG: 81 TABLET CHEWABLE at 20:03

## 2023-01-01 RX ADMIN — DEXTROSE AND SODIUM CHLORIDE: 5; 450 INJECTION, SOLUTION INTRAVENOUS at 05:09

## 2023-01-01 RX ADMIN — IRON SUCROSE 500 MG: 20 INJECTION, SOLUTION INTRAVENOUS at 10:32

## 2023-01-01 RX ADMIN — FLUDROCORTISONE ACETATE 0.2 MG: 0.1 TABLET ORAL at 08:50

## 2023-01-01 RX ADMIN — THERA TABS 1 TABLET: TAB at 11:24

## 2023-01-01 RX ADMIN — ACETAMINOPHEN 1000 MG: 500 TABLET ORAL at 00:40

## 2023-01-01 RX ADMIN — IPRATROPIUM BROMIDE AND ALBUTEROL SULFATE 3 ML: .5; 3 SOLUTION RESPIRATORY (INHALATION) at 19:56

## 2023-01-01 ASSESSMENT — ENCOUNTER SYMPTOMS
SHORTNESS OF BREATH: 0
ABDOMINAL PAIN: 0

## 2023-01-01 ASSESSMENT — ACTIVITIES OF DAILY LIVING (ADL)
ADLS_ACUITY_SCORE: 44
ADLS_ACUITY_SCORE: 53
ADLS_ACUITY_SCORE: 53
ADLS_ACUITY_SCORE: 49
ADLS_ACUITY_SCORE: 47
ADLS_ACUITY_SCORE: 53
ADLS_ACUITY_SCORE: 53
ADLS_ACUITY_SCORE: 44
ADLS_ACUITY_SCORE: 53
ADLS_ACUITY_SCORE: 35
ADLS_ACUITY_SCORE: 53
ADLS_ACUITY_SCORE: 49
ADLS_ACUITY_SCORE: 56
ADLS_ACUITY_SCORE: 51
ADLS_ACUITY_SCORE: 52
ADLS_ACUITY_SCORE: 48
ADLS_ACUITY_SCORE: 47
ADLS_ACUITY_SCORE: 48
ADLS_ACUITY_SCORE: 55
ADLS_ACUITY_SCORE: 51
ADLS_ACUITY_SCORE: 41
ADLS_ACUITY_SCORE: 52
ADLS_ACUITY_SCORE: 51
ADLS_ACUITY_SCORE: 35
ADLS_ACUITY_SCORE: 53
ADLS_ACUITY_SCORE: 49
ADLS_ACUITY_SCORE: 53
ADLS_ACUITY_SCORE: 47
ADLS_ACUITY_SCORE: 44
DEPENDENT_IADLS:: CLEANING;COOKING;LAUNDRY;SHOPPING;MEAL PREPARATION;MEDICATION MANAGEMENT;MONEY MANAGEMENT;TRANSPORTATION
ADLS_ACUITY_SCORE: 35
ADLS_ACUITY_SCORE: 49
ADLS_ACUITY_SCORE: 47
ADLS_ACUITY_SCORE: 53
ADLS_ACUITY_SCORE: 49
ADLS_ACUITY_SCORE: 48
ADLS_ACUITY_SCORE: 41
ADLS_ACUITY_SCORE: 35
ADLS_ACUITY_SCORE: 52
ADLS_ACUITY_SCORE: 49
ADLS_ACUITY_SCORE: 51
ADLS_ACUITY_SCORE: 48
ADLS_ACUITY_SCORE: 44
ADLS_ACUITY_SCORE: 48
ADLS_ACUITY_SCORE: 48
ADLS_ACUITY_SCORE: 49
ADLS_ACUITY_SCORE: 48
ADLS_ACUITY_SCORE: 53

## 2023-01-02 NOTE — PROGRESS NOTES
[facility] GERIATRICS DISCHARGE SUMMARY  PATIENT'S NAME: Juan Tee  YOB: 1935  MEDICAL RECORD NUMBER:  [id number]  Place of Service where encounter took place:  [facility] (SNF) [[id number]]    PRIMARY CARE PROVIDER AND CLINIC RESPONSIBLE AFTER TRANSFER:   Keith Steward MD, [address] [facility] [address]    Non-FMG Provider     Transferring providers: Maxim Bryan PA-C, Zee Carrillo MD  Recent Hospitalization/ED:  [facility] stay 12/19/22 to 12/22/22.  Date of SNF Admission: December 22, 2022  Date of SNF (anticipated) Discharge: January 04, 2023  Discharged to: new assisted living for patient  Cognitive Scores: SLUMS: 18/30  Physical Function: Ambulating 240 ft with walker  DME: SNF  coordinating DME needs     CODE STATUS/ADVANCE DIRECTIVES DISCUSSION:  Full Code   ALLERGIES: Patient has no known allergies.    NURSING FACILITY COURSE     Brief summary: Patient is an 88yo male with PMHx HTN, HLD, CAD, parkinson's disease with lewy body dementia, CKD-2, anemia, lymphedema who [facility] after sustaining a mechanical fall at home with resultant right hip pain and ecchymosis. Patient resides with niece and has a camera in his room, he was noted to fall and be unable to rise prompting family to bring him to ED for evaluation. Imaging included plain films of hip/pelvis and CT head/neck which were negative for acute injury. He was admitted for pain control, PT evaluations. Hospitalization complicated by mild delirium requiring PRN antipsychotics, 1:1 sitter. This improved with scheduled zyprexa, melatonin. He was able to participate in PT evaluation, referred to TCU for ongoing rehab, medical management.    Summary of nursing facility stay:     S/P Mechanical Fall  R hip pain, ecchymosis, resolved  Physical deconditioning  Impaired mobility and  ADLs  Reported to fall at home and unable to rise due to right hip pain/ecchymosis. Plain films negative for injury. Pain improved with topical lidocaine, scheduled tylenol. Walking 240ft in therapy. Pain resolved.  -Continue pain control with PRN tylenol, topical lidocaine  -PT/OT to continue at discharge  -SW following, pt to discharge to [facility]     Dementia with behaviors  Parkinson's   Delirium, improved  -Continues on Sinemet 25/100 TID  -Continue olanzapine 5mg at bedtime, melatonin at bedtime  -PRN olanzapine 2.5mg discontinued     CAD  HTN / HLD  Chronic, stable. BPs ranging 105-110, HRs 60-78.  -Continue atorvastatin 10mg daily, metoprolol 25mg daily, furosemide 20mg daily, ASA 81mg daily     BLE Lymphedema  -Lymphedema wraps     Polymyositis  -Continue azathioprine     CKD-2  Baseline Cr nml, elevated at 1.2 on admission. Improved to 1.02 upon discharge.  -Monitor periodically    Discharge Medications:  MED REC REQUIRED  Post Medication Reconciliation Status: discharge medications reconciled and changed, per note/orders       Current Outpatient Medications   Medication Sig Dispense Refill     aspirin 81 mg chewable tablet Take 81 mg by mouth every evening        atorvastatin (LIPITOR) 10 MG tablet Take 10 mg by mouth every evening       azaTHIOprine (IMURAN) 50 mg tablet Take 25 mg by mouth daily        calcium carbonate (OS-KACIE) 1500 (600 Ca) MG tablet Take 600 mg by mouth daily       carbidopa-levodopa (SINEMET)  MG tablet Take 1 tablet by mouth 3 times per day 4 - 5 hours apart. 270 tablet 3     cholecalciferol, vitamin D3, 5,000 unit capsule [CHOLECALCIFEROL, VITAMIN D3, 5,000 UNIT CAPSULE] Take 5,000 Units by mouth daily.       DOCOSAHEXANOIC ACID/EPA (FISH OIL ORAL) [DOCOSAHEXANOIC ACID/EPA (FISH OIL ORAL)] Take 1,200 mg by mouth daily.       FOLIC ACID/MULTIVIT-MIN/LUTEIN (CENTRUM SILVER ORAL) [FOLIC ACID/MULTIVIT-MIN/LUTEIN (CENTRUM SILVER ORAL)] Take 1 tablet by mouth daily.        "furosemide (LASIX) 20 MG tablet Take 1 tablet (20 mg) by mouth daily 30 tablet 0     lidocaine (XYLOCAINE) 5 % external ointment Apply topically 4 times daily 50 g 1     melatonin 5 MG tablet Take 5 mg by mouth At Bedtime       metoprolol succinate ER (TOPROL-XL) 25 MG 24 hr tablet Take 25 mg by mouth every evening       OLANZapine zydis (ZYPREXA) 5 MG ODT Take 1 tablet (5 mg) by mouth At Bedtime 30 tablet 0     senna-docusate (SENOKOT-S/PERICOLACE) 8.6-50 MG tablet Take 2 tablets by mouth 2 times daily as needed for constipation 30 tablet 1     VIT C/E/ZN/COPPR/LUTEIN/ZEAXAN (PRESERVISION AREDS 2 ORAL) [VIT C/E/ZN/COPPR/LUTEIN/ZEAXAN (PRESERVISION AREDS 2 ORAL)] Take 1 capsule by mouth 2 (two) times a day.            Controlled medications:   not applicable/none     Past Medical History:   Past Medical History:   Diagnosis Date     History of skin cancer      HTN (hypertension)      Infection due to 2019 novel coronavirus      Physical Exam:   Vitals: /57   Pulse 78   Temp 97.9  F (36.6  C)   Resp 16   Ht 1.676 m (5' 6\")   Wt 71.7 kg (158 lb)   SpO2 98%   BMI 25.50 kg/m    BMI: Body mass index is 25.5 kg/m .    GEN: well-developed, well-nourished, appears comfortable  HEENT: NCAT, EOM intact bilaterally, nose & mouth patent, mucous membranes moist  CHEST: lungs CTA bilaterally, no increased work of breathing, no wheeze, crackles, rhonchi  HEART: RRR, S1 & S2, no murmur  ABD: soft, nontender, nondistended  MSK: AROM bilateral UE/LE  NEURO: awake, alert, oriented to name, situation. CN II-XII grossly intact. Sensation grossly intact to light touch.   SKIN: warm & dry without rash, 1-2+ pedal edema bilaterally    SNF labs:   Most Recent 3 CBC's:  Recent Labs   Lab Test 12/20/22  0656 12/19/22  1629 01/12/22  1831   WBC 6.3 5.7 6.2   HGB 12.1* 13.3 13.0*   MCV 99 100 103*    213 209     Most Recent 3 BMP's:  Recent Labs   Lab Test 12/21/22  0934 12/20/22  0656 12/19/22  1629 04/20/22  1420   NA  --  " 141 141 142   POTASSIUM  --  4.2 4.3 4.5   CHLORIDE  --  105 104 102   CO2  --  28 32* 29   BUN  --  25 26 26   CR  --  1.02 1.21 1.19   ANIONGAP  --  8 5 11   KACIE  --  9.1 9.5 9.4   GLC 98 88 119 112       DISCHARGE PLAN:    Follow up labs: No labs orders/due    Medical Follow Up:      Follow up with primary care provider in 1-2 weeks    Discharge Services: Home Care:  Occupational Therapy, Physical Therapy, Registered Nurse and Home Health Aide    Discharge Instructions Verbalized to Patient at Discharge:     None    TOTAL DISCHARGE TIME:   Greater than 30 minutes  Electronically signed by:  Maxim Bryan PA-C     Documentation of Face to Face and Certification for Home Health Services    I certify that services are/were furnished while this patient was under the care of a physician and that a physician or an allowed non-physician practitioner (NPP), had a face-to-face encounter that meets the physician face-to-face encounter requirements. The encounter was in whole, or in part, related to the primary reason for home health. The patient is confined to his/her home and needs intermittent skilled nursing, physical therapy, speech-language pathology, or the continued need for occupational therapy. A plan of care has been established by a physician and is periodically reviewed by a physician.  Date of Face-to-Face Encounter: 1/2/2023.    I certify that, based on my findings, the following services are medically necessary home health services: Nursing, Occupational Therapy and Physical Therapy.    My clinical findings support the need for the above skilled services because: Requires assistance of another person or specialized equipment to access medical services because patient: Is prone to wander/get lost without assistance...    Patient to re-establish plan of care with their PCP within 7-10 days after leaving the facility to reestablish care.  Medicare certified PECOS provider: Maxim Bryan PA-C  Date: January 3,  2023

## 2023-01-02 NOTE — LETTER
1/2/2023        RE: Juan Tee  [address] [address]        [facility] GERIATRICS DISCHARGE SUMMARY  PATIENT'S NAME: Juan Tee  YOB: 1935  MEDICAL RECORD NUMBER:  [id number]  Place of Service where encounter took place:  [facility] (SNF) [[id number]]    PRIMARY CARE PROVIDER AND CLINIC RESPONSIBLE AFTER TRANSFER:   Keith Steward MD, [address] [facility] [address]    Non-FMG Provider     Transferring providers: Maxim Bryan PA-C, Zee Carrillo MD  Recent Hospitalization/ED:  [facility] stay 12/19/22 to 12/22/22.  Date of SNF Admission: December 22, 2022  Date of SNF (anticipated) Discharge: January 04, 2023  Discharged to: new assisted living for patient  Cognitive Scores: SLUMS: 18/30  Physical Function: Ambulating 240 ft with walker  DME: SNF  coordinating DME needs     CODE STATUS/ADVANCE DIRECTIVES DISCUSSION:  Full Code   ALLERGIES: Patient has no known allergies.    NURSING FACILITY COURSE     Brief summary: Patient is an 86yo male with PMHx HTN, HLD, CAD, parkinson's disease with lewy body dementia, CKD-2, anemia, lymphedema who was admitted at [facility] from 12/19 - 12/22, 2022 after sustaining a mechanical fall at home with resultant right hip pain and ecchymosis. Patient resides with niece and has a camera in his room, he was noted to fall and be unable to rise prompting family to bring him to ED for evaluation. Imaging included plain films of hip/pelvis and CT head/neck which were negative for acute injury. He was admitted for pain control, PT evaluations. Hospitalization complicated by mild delirium requiring PRN antipsychotics, 1:1 sitter. This improved with scheduled zyprexa, melatonin. He was able to participate in PT evaluation, referred to TCU for ongoing rehab, medical management.    Summary of nursing facility stay:     S/P  Mechanical Fall  R hip pain, ecchymosis, resolved  Physical deconditioning  Impaired mobility and ADLs  Reported to fall at home and unable to rise due to right hip pain/ecchymosis. Plain films negative for injury. Pain improved with topical lidocaine, scheduled tylenol. Walking 240ft in therapy. Pain resolved.  -Continue pain control with PRN tylenol, topical lidocaine  -PT/OT to continue at discharge  -SW following, pt to discharge to [facility]     Dementia with behaviors  Parkinson's   Delirium, improved  -Continues on Sinemet 25/100 TID  -Continue olanzapine 5mg at bedtime, melatonin at bedtime  -PRN olanzapine 2.5mg discontinued     CAD  HTN / HLD  Chronic, stable. BPs ranging 105-110, HRs 60-78.  -Continue atorvastatin 10mg daily, metoprolol 25mg daily, furosemide 20mg daily, ASA 81mg daily     BLE Lymphedema  -Lymphedema wraps     Polymyositis  -Continue azathioprine     CKD-2  Baseline Cr nml, elevated at 1.2 on admission. Improved to 1.02 upon discharge.  -Monitor periodically    Discharge Medications:  MED REC REQUIRED  Post Medication Reconciliation Status: discharge medications reconciled and changed, per note/orders       Current Outpatient Medications   Medication Sig Dispense Refill     aspirin 81 mg chewable tablet Take 81 mg by mouth every evening        atorvastatin (LIPITOR) 10 MG tablet Take 10 mg by mouth every evening       azaTHIOprine (IMURAN) 50 mg tablet Take 25 mg by mouth daily        calcium carbonate (OS-KACIE) 1500 (600 Ca) MG tablet Take 600 mg by mouth daily       carbidopa-levodopa (SINEMET)  MG tablet Take 1 tablet by mouth 3 times per day 4 - 5 hours apart. 270 tablet 3     cholecalciferol, vitamin D3, 5,000 unit capsule [CHOLECALCIFEROL, VITAMIN D3, 5,000 UNIT CAPSULE] Take 5,000 Units by mouth daily.       DOCOSAHEXANOIC ACID/EPA (FISH OIL ORAL) [DOCOSAHEXANOIC ACID/EPA (FISH OIL ORAL)] Take 1,200 mg by mouth daily.       FOLIC ACID/MULTIVIT-MIN/LUTEIN (CENTRUM SILVER ORAL)  "[FOLIC ACID/MULTIVIT-MIN/LUTEIN (CENTRUM SILVER ORAL)] Take 1 tablet by mouth daily.       furosemide (LASIX) 20 MG tablet Take 1 tablet (20 mg) by mouth daily 30 tablet 0     lidocaine (XYLOCAINE) 5 % external ointment Apply topically 4 times daily 50 g 1     melatonin 5 MG tablet Take 5 mg by mouth At Bedtime       metoprolol succinate ER (TOPROL-XL) 25 MG 24 hr tablet Take 25 mg by mouth every evening       OLANZapine zydis (ZYPREXA) 5 MG ODT Take 1 tablet (5 mg) by mouth At Bedtime 30 tablet 0     senna-docusate (SENOKOT-S/PERICOLACE) 8.6-50 MG tablet Take 2 tablets by mouth 2 times daily as needed for constipation 30 tablet 1     VIT C/E/ZN/COPPR/LUTEIN/ZEAXAN (PRESERVISION AREDS 2 ORAL) [VIT C/E/ZN/COPPR/LUTEIN/ZEAXAN (PRESERVISION AREDS 2 ORAL)] Take 1 capsule by mouth 2 (two) times a day.            Controlled medications:   not applicable/none     Past Medical History:   Past Medical History:   Diagnosis Date     History of skin cancer      HTN (hypertension)      Infection due to 2019 novel coronavirus      Physical Exam:   Vitals: /57   Pulse 78   Temp 97.9  F (36.6  C)   Resp 16   Ht 1.676 m (5' 6\")   Wt 71.7 kg (158 lb)   SpO2 98%   BMI 25.50 kg/m    BMI: Body mass index is 25.5 kg/m .    GEN: well-developed, well-nourished, appears comfortable  HEENT: NCAT, EOM intact bilaterally, nose & mouth patent, mucous membranes moist  CHEST: lungs CTA bilaterally, no increased work of breathing, no wheeze, crackles, rhonchi  HEART: RRR, S1 & S2, no murmur  ABD: soft, nontender, nondistended  MSK: AROM bilateral UE/LE  NEURO: awake, alert, oriented to name, situation. CN II-XII grossly intact. Sensation grossly intact to light touch.   SKIN: warm & dry without rash, 1-2+ pedal edema bilaterally    SNF labs:   Most Recent 3 CBC's:  Recent Labs   Lab Test 12/20/22  0656 12/19/22  1629 01/12/22  1831   WBC 6.3 5.7 6.2   HGB 12.1* 13.3 13.0*   MCV 99 100 103*    213 209     Most Recent 3 " BMP's:  Recent Labs   Lab Test 12/21/22  0934 12/20/22  0656 12/19/22  1629 04/20/22  1420   NA  --  141 141 142   POTASSIUM  --  4.2 4.3 4.5   CHLORIDE  --  105 104 102   CO2  --  28 32* 29   BUN  --  25 26 26   CR  --  1.02 1.21 1.19   ANIONGAP  --  8 5 11   KACIE  --  9.1 9.5 9.4   GLC 98 88 119 112       DISCHARGE PLAN:    Follow up labs: No labs orders/due    Medical Follow Up:      Follow up with primary care provider in 1-2 weeks    Discharge Services: Home Care:  Occupational Therapy, Physical Therapy, Registered Nurse and Home Health Aide    Discharge Instructions Verbalized to Patient at Discharge:     None    TOTAL DISCHARGE TIME:   Greater than 30 minutes  Electronically signed by:  Maxim Bryan PA-C     Documentation of Face to Face and Certification for Home Health Services    I certify that services are/were furnished while this patient was under the care of a physician and that a physician or an allowed non-physician practitioner (NPP), had a face-to-face encounter that meets the physician face-to-face encounter requirements. The encounter was in whole, or in part, related to the primary reason for home health. The patient is confined to his/her home and needs intermittent skilled nursing, physical therapy, speech-language pathology, or the continued need for occupational therapy. A plan of care has been established by a physician and is periodically reviewed by a physician.  Date of Face-to-Face Encounter: 1/2/2023.    I certify that, based on my findings, the following services are medically necessary home health services: Nursing, Occupational Therapy and Physical Therapy.    My clinical findings support the need for the above skilled services because: Requires assistance of another person or specialized equipment to access medical services because patient: Is prone to wander/get lost without assistance...    Patient to re-establish plan of care with their PCP within 7-10 days after leaving the  facility to reestablish care.  Medicare certified PECOS provider: Maxim Bryan PA-C  Date: January 3, 2023                    Sincerely,        Maxim Bryan PA-C

## 2023-01-09 NOTE — LETTER
"2023       RE: Juan Tee  [address] [address]     Dear Colleague,    Thank you for referring your patient, Juan Tee, to the [facility] [place] at [facility]. Please see a copy of my visit note below.      ASSESSMENT:    1)  Parkinson's Disease:      2)  Dementia:  In memory care.     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       PLAN:    __  To manage psychosis, I'll recommended changing olanzapine to quetiapine or Nuplazid. Since olanzapine depletes dopamine, it might exacerbate PD symptoms, like slowness, shuffling gait, tremors . . .    __  Encouraged Luz Elena to talk to the [facility] providers to take over managing patient's PD, dementia, and psychosis.    __  May reach out to our clinic anytime as needed.     __  No future appointment for now.  Luz Elena will contact our clinic as needed.         MOVEMENT DISORDERS CLINIC           PATIENT: Juan Tee    : 1935    DATE: 2023    REASON FOR VISIT: Parkinson's disease, psychosis due to PD, and dementia follow up.    HPI: Mr. Juan Tee is an 87 year old who is seen via video visit for a follow up visit.      During today's video visit patient's niece, Luz Elena, who is also patient's health care agent, was present by herself.  Patient was in a group exercise.      Luz Elena reports that pt was \"out of it for about two months.\" He was opening the door and roaming in the middle of the night. He had a fall that led to hospitalization on 2022.    In the hospital, he was given Haldol for agitation and was discharged to rehab and memory care on Olanzapine 2.5 mg Q 6 hrs PRN and 5 mg at HS. Luz Elena reports that he is still taking Olanzapine. No psychosis or memory issues.    [facility] team will provide care for patient. Luz Elena asked if he should his neurology appointments or have the [facility] " providers manage his PD, dementia, and behavior. She will be meeting with the care team next week.     He is taking Sinemet 25/100 mg 1 tab TID.    Pt is participating in Group Therapy.     I spent 17 minutes discussing care plan for the patient with his niece including video time, reviewing records, answering questions, and documentation.    TRINITY Ch,  CNP  [facility]

## 2023-01-09 NOTE — PROGRESS NOTES
"Juan is a 87 year old who is being evaluated via a billable video visit.      How would you like to obtain your AVS? Gamemaster  If the video visit is dropped, the invitation should be resent by: Send to e-mail at: [email or web address]  Will anyone else be joining your video visit? No             Video-Visit Details    Type of service:  Video Visit   Video Start Time: 2:38 pm  Video End Time:2:50 pm  12 minutes    Originating Location (pt. Location): Home    Distant Location (provider location):  On-site  Platform used for Video Visit: [facility]    Chief Complaint   Patient presents with     JUAN Spear    -----------------------------------------------------------------------------------------------------------------------------------------------------------------------------------------------------------      ASSESSMENT:    1)  Parkinson's Disease:      2)  Dementia:  In memory care.     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       PLAN:    __  To manage psychosis, I'll recommended changing olanzapine to quetiapine or Nuplazid. Since olanzapine depletes dopamine, it might exacerbate PD symptoms, like slowness, shuffling gait, tremors . . .    __  Encouraged Luz Elena to talk to the [facility] [place] providers to take over managing patient's PD, dementia, and psychosis.    __  May reach out to our clinic anytime as needed.     __  No future appointment for now.  Luz Elena will contact our clinic as needed.         MOVEMENT DISORDERS CLINIC           PATIENT: Juan RITTER Rockyminesh    : 1935    DATE: 2023    REASON FOR VISIT: Parkinson's disease, psychosis due to PD, and dementia follow up.    HPI: Mr. Juan Tee is an 87 year old who is seen via video visit for a follow up visit.      During today's video visit patient's niece, Luz Elena, who is also patient's health care agent, was present by herself.  Patient was in a group exercise.      Luz Elena reports that pt was \"out of it for " "about two months.\" He was opening the door and roaming in the middle of the night. He had a fall that led to hospitalization on 12/19/2022.    In the hospital, he was given Haldol for agitation and was discharged to rehab and memory care on Olanzapine 2.5 mg Q 6 hrs PRN and 5 mg at HS. Luz Elena reports that he is still taking Olanzapine. No psychosis or memory issues.    [facility] team will provide care for patient. Luz Elena asked if he should his neurology appointments or have the [facility] providers manage his PD, dementia, and behavior. She will be meeting with the care team next week.     He is taking Sinemet 25/100 mg 1 tab TID.    Pt is participating in Group Therapy.     I spent 17 minutes discussing care plan for the patient with his niece including video time, reviewing records, answering questions, and documentation.    TRINITY Ch,  CNP  [facility]     "

## 2023-01-12 NOTE — PATIENT INSTRUCTIONS
Dear Mr. Juan Clarkdawnaminesh,    Thank you for coming today.  During your visit, we have discussed the following:     ASSESSMENT:    1)  Parkinson's Disease:      2)  Dementia:  In memory care.     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       PLAN:    __  To manage psychosis, I'll recommended changing olanzapine to quetiapine or Nuplazid. Since olanzapine depletes dopamine, it might exacerbate PD symptoms, like slowness, shuffling gait, tremors . . .    __  Encouraged Luz Elena to talk to the [facility] [place] providers to take over managing patient's PD, dementia, and psychosis.    __  May reach out to our clinic anytime as needed.     __  No future appointment for now.  Luz Elena will contact our clinic as needed.       For questions, you may send us a ByRead message or call [phone]    Fax number: [phone]    TRINITY Ch, CNP  [facility]

## 2023-08-30 PROBLEM — I89.0 LYMPHEDEMA: Status: ACTIVE | Noted: 2023-01-01

## 2023-08-30 PROBLEM — N18.31 STAGE 3A CHRONIC KIDNEY DISEASE (CKD) (H): Status: ACTIVE | Noted: 2023-01-01

## 2023-08-30 PROBLEM — R26.81 GAIT INSTABILITY: Status: ACTIVE | Noted: 2023-01-01

## 2023-08-30 PROBLEM — M54.50 CHRONIC LOW BACK PAIN: Status: ACTIVE | Noted: 2023-01-01

## 2023-08-30 PROBLEM — D64.9 ANEMIA: Status: ACTIVE | Noted: 2023-01-01

## 2023-08-30 PROBLEM — H61.20 IMPACTED CERUMEN: Status: ACTIVE | Noted: 2023-01-01

## 2023-08-30 PROBLEM — F02.811: Status: ACTIVE | Noted: 2023-01-01

## 2023-08-30 PROBLEM — R29.6 FALLS FREQUENTLY: Status: ACTIVE | Noted: 2023-01-01

## 2023-08-30 PROBLEM — I13.0 HYPERTENSIVE HEART AND CHRONIC KIDNEY DISEASE WITH HEART FAILURE AND STAGE 1 THROUGH STAGE 4 CHRONIC KIDNEY DISEASE, OR UNSPECIFIED CHRONIC KIDNEY DISEASE (H): Status: ACTIVE | Noted: 2023-01-01

## 2023-08-30 PROBLEM — B02.9 HERPES ZOSTER: Status: ACTIVE | Noted: 2023-01-01

## 2023-08-30 PROBLEM — G89.29 CHRONIC LOW BACK PAIN: Status: ACTIVE | Noted: 2023-01-01

## 2023-08-30 PROBLEM — R53.1 WEAKNESS: Status: ACTIVE | Noted: 2023-01-01

## 2023-08-30 PROBLEM — I82.462 ACUTE DEEP VEIN THROMBOSIS (DVT) OF CALF MUSCLE VEIN OF LEFT LOWER EXTREMITY (H): Status: ACTIVE | Noted: 2023-01-01

## 2023-08-30 NOTE — ED TRIAGE NOTES
Pt presents to the ED with c/o increased SOB, weakness, and falls the past couple days. Pt did fall backwards today and landed on buttocks. Denies hitting head. Denies any fevers. EMS reports 89% O2 on arrival. Pt on 2L NC O2 and sats >92% now.

## 2023-08-30 NOTE — PHARMACY-ADMISSION MEDICATION HISTORY
Pharmacist Admission Medication History    Admission medication history is complete. The information provided in this note is only as accurate as the sources available at the time of the update.    Medication reconciliation/reorder completed by provider prior to medication history? No    Information Source(s): Facility ([facility]/) medication list/MAR via N/A [facility] list    Pertinent Information: None    Changes made to PTA medication list:  Added: Fludrocortisone , tylenol  Deleted: Vitamin D , furosemide, metoprolol  Changed: olanzepine ODT to olanzepine    Medication Affordability: NA       Allergies reviewed with patient and updates made in EHR: unable to assess    Medication History Completed By: Ashlee Magaña RPH 8/30/2023 11:54 AM    Prior to Admission medications    Medication Sig Last Dose Taking? Auth Provider Long Term End Date   acetaminophen (TYLENOL) 500 MG tablet Take 1,000 mg by mouth 3 times daily as needed for pain Unknown Yes Unknown, Entered By History     aspirin 81 mg chewable tablet Take 81 mg by mouth every evening  8/29/2023 at pm Yes Provider, Historical     atorvastatin (LIPITOR) 10 MG tablet Take 10 mg by mouth every evening 8/29/2023 at pm Yes Unknown, Entered By History     azaTHIOprine (IMURAN) 50 mg tablet Take 25 mg by mouth daily  8/30/2023 at am Yes Provider, Historical     calcium carbonate (OS-KACIE) 1500 (600 Ca) MG tablet Take 600 mg by mouth daily 8/30/2023 at am Yes Unknown, Entered By History     carbidopa-levodopa (SINEMET)  MG tablet Take 1 tablet by mouth 3 times per day 4 - 5 hours apart. 8/30/2023 at am Yes Antonella Kaminski APRN CNP Yes    DOCOSAHEXANOIC ACID/EPA (FISH OIL ORAL) [DOCOSAHEXANOIC ACID/EPA (FISH OIL ORAL)] Take 1,200 mg by mouth daily. 8/30/2023 at am Yes Provider, Historical     fludrocortisone (FLORINEF) 0.1 MG tablet Take 0.2 mg by mouth daily 8/30/2023 at am Yes Unknown, Entered By History Yes             lidocaine  (XYLOCAINE) 5 % external ointment Apply topically 2 times daily as needed (back pain) Unknown Yes Unknown, Entered By History     melatonin 5 MG tablet Take 5 mg by mouth At Bedtime 8/29/2023 at pm Yes Unknown, Entered By History     Multiple Vitamins-Minerals (CEROVITE SENIOR) TABS Take 1 tablet by mouth daily 8/30/2023 at am Yes Unknown, Entered By History     OLANZapine (ZYPREXA) 5 MG tablet Take 5 mg by mouth At Bedtime 8/29/2023 at pm Yes Unknown, Entered By History No    senna-docusate (SENOKOT-S/PERICOLACE) 8.6-50 MG tablet Take 2 tablets by mouth 2 times daily as needed for constipation Unknown Yes Edmond Davis DO     VIT C/E/ZN/COPPR/LUTEIN/ZEAXAN (PRESERVISION AREDS 2 ORAL) [VIT C/E/ZN/COPPR/LUTEIN/ZEAXAN (PRESERVISION AREDS 2 ORAL)] Take 1 capsule by mouth 2 (two) times a day. 8/30/2023 at am Yes Provider, Historical

## 2023-08-30 NOTE — ED PROVIDER NOTES
EMERGENCY DEPARTMENT ENCOUNTER      NAME: Juan Tee  AGE: 87 year old male  YOB: 1935  MRN: [id number]  EVALUATION DATE & TIME: 2023 11:17 AM    PCP: Keith Steward    ED PROVIDER: Bushra Zaragoza MD      Chief Complaint   Patient presents with    Shortness of Breath    Fatigue         FINAL IMPRESSION:  1. Weakness    2. Falls frequently        MEDICAL DECISION MAKIN:43 AM I met with the patient, obtained history, performed an initial exam, and discussed options and plan for diagnostics and treatment here in the ED.   1:32 PM I discussed the case with hospitalist, Dr Vazquez, who accepts the patient    Pertinent Labs & Imaging studies reviewed. (See chart for details)           Juan Tee is a 87 year old male who presents with progressive weakness and fatigue.  Over the last month he has been having progressive weakness and fatigue to the point that he has had multiple falls.  Each of the falls he seems to fall on his backside but his daughter states that 1 time he did land hitting the left side of his head.  States he is having some lightheadedness general weakness and fatigue weakness greater in the lower than upper extremities.  He does use a diaper during the day no known dysuria or hematuria.  Today he began having lower blood pressures was having wheezing and a cough and was sent to the emergency department.  No recent black or bloody stool but he has been having some diarrhea.  Patient has history of significant dementia history obtained both from EMS in addition to family members.  Broad differential including infectious etiology progression of disease ACS anemia intracranial hemorrhage traumatic injuries will order laboratory testing and imaging will watch closely.    Plan: Laboratory testing unremarkable chest x-ray demonstrates pneumonia as likely etiology for worsening symptoms being treated with antibiotics in the emergency department  patient and family comfortable with admission plan.     Medical Decision Making    History:  Supplemental history from: Family Member/Significant Other and EMS  External Record(s) reviewed: Inpatient Record: [facility] 12/22    Work Up:  Chart documentation includes differential considered and any EKGs or imaging independently interpreted by provider, where specified.  In additional to work up documented, I considered the following work up: Documented in chart, if applicable.    External consultation:  Discussion of management with another provider: Hospitalist    Complicating factors:  Care impacted by chronic illness: Chronic Kidney Disease, Hyperlipidemia, Hypertension, and Other: CAD, Parkinson's, Lewy body dementia.   Care affected by social determinants of health: N/A    Disposition considerations: Admit.          MEDICATIONS GIVEN IN THE EMERGENCY:  Medications   sodium chloride (PF) 0.9% PF flush 3 mL (has no administration in time range)   sodium chloride (PF) 0.9% PF flush 3 mL ( Intracatheter Canceled Entry 8/30/23 1418)   acetaminophen (TYLENOL) tablet 1,000 mg (has no administration in time range)   aspirin (ASA) chewable tablet 81 mg (has no administration in time range)   atorvastatin (LIPITOR) tablet 10 mg (has no administration in time range)   azaTHIOprine (IMURAN) half-tab 25 mg (has no administration in time range)   carbidopa-levodopa (SINEMET)  MG per tablet 1 tablet (has no administration in time range)   fludrocortisone (FLORINEF) tablet 0.2 mg (has no administration in time range)   multivitamin, therapeutic (THERA-VIT) tablet 1 tablet (has no administration in time range)   OLANZapine (zyPREXA) tablet 5 mg (has no administration in time range)   senna-docusate (SENOKOT-S/PERICOLACE) 8.6-50 MG per tablet 2 tablet (has no administration in time range)   multivitamin  with lutein (OCUVITE WITH LUTEIN) per capsule (has no administration in time range)   lidocaine 1 % 0.1-1 mL (has no  administration in time range)   lidocaine (LMX4) cream (has no administration in time range)   sodium chloride (PF) 0.9% PF flush 3 mL (has no administration in time range)   sodium chloride (PF) 0.9% PF flush 3 mL (has no administration in time range)   melatonin tablet 1 mg (has no administration in time range)   heparin ANTICOAGULANT injection 5,000 Units (has no administration in time range)   ondansetron (ZOFRAN ODT) ODT tab 4 mg (has no administration in time range)     Or   ondansetron (ZOFRAN) injection 4 mg (has no administration in time range)   polyethylene glycol (MIRALAX) Packet 17 g (has no administration in time range)   calcium carbonate 500 mg (elemental) (OSCAL) tablet 500 mg (has no administration in time range)   sodium chloride 0.9% infusion (has no administration in time range)   cefTRIAXone (ROCEPHIN) 1 g vial to attach to  mL bag for ADULTS or NS 50 mL bag for PEDS (has no administration in time range)   azithromycin 500 mg (ZITHROMAX) in 0.9% NaCl 250 mL intermittent infusion 500 mg (has no administration in time range)       NEW PRESCRIPTIONS STARTED AT TODAY'S ER VISIT  New Prescriptions    No medications on file            =================================================================    HPI  Use of : N/A         Juan Tee is a 87 year old male who presents with shortness of breath.    Per patient: Patient has fallen multiple times this past week due to increased leg weakness. Patient reports pain in his left ankle after a recent fall. He notes increased shortness of breath during this time which is worse with exertion and mild increase in leg swelling. Also during this time he has had intermittent diarrhea. Patient uses a diaper and a walker at baseline and transfers by himself. He eats a salt reduced diet. Patient denies hitting his head,  loss of appetite, fever, cough, weight gain, bloody stool, abdominal pain, dysuria, or any other complaints at  this time.       Per Chart Review: Admission at [facility] in 12/22: Patient has a history of hypertension, hyperlipidemia, lewy body dementia, parkinson's, CKD 3, and CAD     Per Family/EMS:  Per EMS: Patient comes from a [facility]. They report the last few days the patient has had increased work of breathing and weakness along with wheezing. When EMS arrive patients oxygen was 89% on room air but jumped back to normal when they applied 2L oxygen. Patient reports dizziness but stroke test was negative. His blood sugar was 120. He did not have a fever and was not tachycardic. Patient fell this morning but denies hitting head or any loss of consciousness.    Per family/caretakers: Patient's blood pressure has been low recently in the 70/40s. His oxygen has also been low and he has been wheezing. Patient's memory care reports increased confusion and new onset hallucinations. Patient has had more falls and less mobility the last month has been unable to do his physical therapy due to low oxygen. He broke his collarbone earlier this month in a fall. Patient has had more incontinence lately. He denies hitting his head but has a new bruise on the left side of his head.      REVIEW OF SYSTEMS   All other systems reviewed and are negative unless noted in HPI.      PAST MEDICAL HISTORY:  Past Medical History:   Diagnosis Date    History of skin cancer     HTN (hypertension)     Infection due to 2019 novel coronavirus        PAST SURGICAL HISTORY:  Past Surgical History:   Procedure Laterality Date    HC OPEN RX PATELLA FX      Left knee Treatment Of Knee Fracture Patellar, Open    HERNIA REPAIR         CURRENT MEDICATIONS:    acetaminophen (TYLENOL) 500 MG tablet  aspirin 81 mg chewable tablet  atorvastatin (LIPITOR) 10 MG tablet  azaTHIOprine (IMURAN) 50 mg tablet  calcium carbonate (OS-KACIE) 1500 (600 Ca) MG tablet  carbidopa-levodopa (SINEMET)  MG tablet  DOCOSAHEXANOIC ACID/EPA (FISH OIL  ORAL)  fludrocortisone (FLORINEF) 0.1 MG tablet  FOLIC ACID/MULTIVIT-MIN/LUTEIN (CENTRUM SILVER ORAL)  lidocaine (XYLOCAINE) 5 % external ointment  melatonin 5 MG tablet  Multiple Vitamins-Minerals (CEROVITE SENIOR) TABS  OLANZapine (ZYPREXA) 5 MG tablet  senna-docusate (SENOKOT-S/PERICOLACE) 8.6-50 MG tablet  VIT C/E/ZN/COPPR/LUTEIN/ZEAXAN (PRESERVISION AREDS 2 ORAL)        ALLERGIES:  No Known Allergies    FAMILY HISTORY:  Family History   Problem Relation Age of Onset    Kidney failure Mother 54    Cerebrovascular Disease Father 68    Sudden Death Father 72         in the doctor's office    Rheumatic fever Sister     No Known Problems Sister     No Known Problems Sister     No Known Problems Sister     No Known Problems Sister     Congenital heart disease Brother 0         at 26    Acute Myocardial Infarction Brother 68    Coronary Artery Disease Brother     CABG Brother 72       SOCIAL HISTORY:   Social History     Socioeconomic History    Marital status: Single    Number of children: 0    Years of education: 12   Tobacco Use    Smoking status: Former     Packs/day: 1.00     Years: 8.00     Pack years: 8.00     Types: Cigarettes     Quit date: 1962     Years since quittin.0    Smokeless tobacco: Never   Substance and Sexual Activity    Alcohol use: Yes     Comment: Alcoholic Drinks/day: rare    Drug use: No    Sexual activity: Never   Social History Narrative    Lived in sister-in-law's house, who passed away in .    Never .    Lives with niece, Krista, and nephew.       VITALS:  Patient Vitals for the past 24 hrs:   BP Temp Temp src Pulse Resp SpO2 Height Weight   23 1416 121/62 -- -- -- -- -- -- --   23 1415 -- -- -- 78 27 90 % -- --   23 1305 -- -- -- -- 24 -- -- --   23 1301 122/87 -- -- 92 -- -- -- --   23 1246 124/80 -- -- 95 21 94 % -- --   23 1217 -- -- -- -- 28 94 % -- --   23 1216 (!) 140/79 -- -- 79 -- -- -- --   23 1200 (!)  "140/65 -- -- 77 18 92 % -- --   08/30/23 1145 117/63 -- -- 77 20 92 % -- --   08/30/23 1130 129/63 -- -- 79 23 96 % -- --   08/30/23 1120 133/68 97.5  F (36.4  C) Oral 77 20 96 % 1.803 m (5' 11\") 84.6 kg (186 lb 8 oz)       PHYSICAL EXAM    VITAL SIGNS: /62   Pulse 78   Temp 97.5  F (36.4  C) (Oral)   Resp 27   Ht 1.803 m (5' 11\")   Wt 84.6 kg (186 lb 8 oz)   SpO2 90%   BMI 26.01 kg/m    General Appearance: Alert, interactive appropriately, well groomed  Head:  small bruising over left side of face  Eyes: EOMI  ENT:  MMM  Cardio:  Regular rate and rhythm  Pulm:  Bibasilar crackles   Abdomen:  Soft NT/ND  MS: No tender over l/s spine, tender over left ankle, 2+edema   Skin: Bruising over left side of face  Neuro: MAEE with general weakness 4/5, unable to fnf with left arm, +tremor, slow to answer questions, oriented x 2      LAB:  All pertinent labs reviewed and interpreted.  Labs Ordered and Resulted from Time of ED Arrival to Time of ED Departure   COMPREHENSIVE METABOLIC PANEL - Abnormal       Result Value    Sodium 143      Potassium 4.1      Chloride 110 (*)     Carbon Dioxide (CO2) 26      Anion Gap 7      Urea Nitrogen 18      Creatinine 0.95      Calcium 8.2 (*)     Glucose 103      Alkaline Phosphatase 94      AST 21      ALT <9      Protein Total 6.0      Albumin 2.6 (*)     Bilirubin Total 0.8      GFR Estimate 78     BLOOD GAS VENOUS - Abnormal    pH Venous 7.45      pCO2 Venous 43      pO2 Venous 23 (*)     Bicarbonate Venous 29      Base Excess/Deficit 5.4      Oxyhemoglobin Venous 39.3 (*)     O2 Sat, Venous 40.1 (*)    INR - Abnormal    INR 1.23 (*)    ROUTINE UA WITH MICROSCOPIC REFLEX TO CULTURE - Abnormal    Color Urine Yellow      Appearance Urine Clear      Glucose Urine Negative      Bilirubin Urine Negative      Ketones Urine Trace (*)     Specific Gravity Urine 1.022      Blood Urine Negative      pH Urine 6.0      Protein Albumin Urine 10 (*)     Urobilinogen Urine 2.0 (*)     " Nitrite Urine Negative      Leukocyte Esterase Urine Negative      Bacteria Urine Few (*)     Mucus Urine Present (*)     Calcium Oxalate Crystals Urine Few (*)     RBC Urine 8 (*)     WBC Urine 2     CBC WITH PLATELETS AND DIFFERENTIAL - Abnormal    WBC Count 7.4      RBC Count 3.48 (*)     Hemoglobin 10.8 (*)     Hematocrit 33.4 (*)     MCV 96      MCH 31.0      MCHC 32.3      RDW 15.3 (*)     Platelet Count 270      % Neutrophils 74      % Lymphocytes 14      % Monocytes 10      % Eosinophils 1      % Basophils 0      % Immature Granulocytes 1      NRBCs per 100 WBC 0      Absolute Neutrophils 5.5      Absolute Lymphocytes 1.1      Absolute Monocytes 0.8      Absolute Eosinophils 0.1      Absolute Basophils 0.0      Absolute Immature Granulocytes 0.0      Absolute NRBCs 0.0     LACTIC ACID WHOLE BLOOD - Normal    Lactic Acid 0.8     PROCALCITONIN - Normal    Procalcitonin 0.11     TROPONIN I - Normal    Troponin I 0.02     B-TYPE NATRIURETIC PEPTIDE ([facility] ONLY)   BLOOD CULTURE   URINE CULTURE       RADIOLOGY:  XR Chest 2 Views   Final Result   IMPRESSION: There some underlying mild fibrotic change in both lung bases similar to previous with new patchy infiltrates in the right midlung concerning for a new superimposed pneumonia. Healing fracture distal left clavicle.      XR Ankle Left G/E 3 Views   Final Result   IMPRESSION: Plantar and Achilles calcaneal spurring. No evidence for fracture. Ankle mortise is intact and symmetric.      Lumbar spine XR, 2-3 views   Final Result   IMPRESSION: Straightening of usual lumbar lordosis without significant spondylolisthesis. No acute fracture. Multilevel degenerative change most prominent at L3-L4, L4-L5 L5-S1 where there is moderate disc height loss and anterior osteophyte formation.      Head CT w/o contrast   Final Result   IMPRESSION:   1.  No evidence of acute intracranial hemorrhage.   2.  No evidence of acute calvarial fracture.   3.  Age-related change.       Echocardiogram Complete    (Results Pending)       EKG:   Performed at: 11:22  Impression: Sinus rhythm  Left axis deviation  Abnormal ECG  Comparison: When compared with ECG of 12-JAN-2022  Premature supraventricular complexes are no longer present.  T wave amplitude has increased in Anterior leads.    I have independently reviewed and interpreted the EKG(s) documented above.     PROCEDURES:   None    I, Jarrod Mayer, am serving as a scribe to document services personally performed by Dr. Zaragoza, based on my observation and the provider's statements to me. I, Dr. Zaragoza, attest that Jarrod Mayer is acting in a scribe capacity, has observed my performance of the services and has documented them in accordance with my direction.     Bushra Zaragoza MD  Emergency Medicine  [facility] EMERGENCY ROOM  [id number] [id number] [phone]  Dept: [phone]       Bushra Zaragoza MD  08/30/23 142

## 2023-08-30 NOTE — H&P
[facility] MEDICINE ADMISSION HISTORY AND PHYSICAL     Assessment & Plan       Juan Tee is a 87 year old male with history of essential hypertension, dyslipidemia, coronary artery disease, polymyositis, Parkinson's disease/Lewy body dementia, CKD 2, bilateral pedal edema, lives at [facility] since 12/2022 came with generalized weakness, recurrent fall.  Admitted to the hospital for further evaluation and treatment.    Generalized weakness and deconditioning  Recurrent fall  Gait instability, using walker  Physical and Occupational Therapy    Community-acquired pneumonia  Chest x-ray-There some underlying mild fibrotic change in both lung bases similar to previous with new patchy infiltrates in the right midlung concerning for a new superimposed pneumonia.   No URI symptoms  Started on IV ceftriaxone and Zithromax     Parkinson's disease/Lewy body dementia  Carbidopa levodopa 25/100 mg 3 times a day    Essential hypertension  Labile hypertension  Not on antihypertensive  On Florinef 0.2 mg daily  Gentle IV hydration    Dyslipidemia  Lipitor 10 mg daily    Coronary artery disease without angina  Stable and asymptomatic  Resume statin and aspirin.  Not on beta-blocker    Chronic systolic CHF, EF 45 to 50%  Bilateral lower extremity and pedal edema  Echocardiogram 1/2022 revealed mild to moderate tricuspid regurgitation, EF 45 to 50%, mild pulmonary hypertension, biatrial enlargement  No acute pulmonary congestion  Not on diuretic at present    CKD 2  Cr is stable    Polymyositis  Continue azathioprine     History of anxiety  Has stable mood and affect  Zyprexa 5 mg at bedtime    Anemia  Iron study      Clinically Significant Risk Factors Present on Admission              # Hypoalbuminemia: Lowest albumin = 2.6 g/dL at 8/30/2023 11:51 AM, will monitor as appropriate  # Coagulation Defect: INR = 1.23 (Ref range: 0.85 - 1.15) and/or PTT = N/A, will monitor for bleeding  # Drug  "Induced Platelet Defect: home medication list includes an antiplatelet medication   # Hypertension: Noted on problem list   # Dementia: noted on problem list    # Overweight: Estimated body mass index is 26.01 kg/m  as calculated from the following:    Height as of this encounter: 1.803 m (5' 11\").    Weight as of this encounter: 84.6 kg (186 lb 8 oz).              Code DNR  Palliative care consultation for goals of care discussion  DVT prophylaxis  Subcu heparin 5000 unit twice a day  Inpatient admission  Needs to stay in the hospital at least for 2 days for further evaluation and treatment  Chief Complaint Generalized weakness and recurrent fall     HISTORY     Juan Tee is a 87 year old male with history of essential hypertension, dyslipidemia, coronary artery disease, polymyositis, Parkinson's disease/Lewy body dementia, CKD 2, bilateral leg and pedal edema, lives at [facility] since 2022 came with generalized weakness, recurrent fall.  He has chronic gait instability..  Using walker.  Had multiple falls lately.  No recent illnesses.  Afebrile.  Denies headache, chest pain, breathing difficulty, palpitation, nausea, vomiting, abdominal pain or urinary symptoms.  In ED, head CT scan is unremarkable.  Chest x-ray revealed new patchy infiltrate in the right midlung concerning for new superimposed pneumonia.  Admitted to the hospital for further evaluation and treatment.    Past Medical History     Past Medical History:  No date: History of skin cancer  No date: HTN (hypertension)  No date: Infection due to 2019 novel coronavirus     Surgical History     Past Surgical History:   Procedure Laterality Date    HC OPEN RX PATELLA FX      Left knee Treatment Of Knee Fracture Patellar, Open    HERNIA REPAIR       Family History    Reviewed, and   Family History   Problem Relation Age of Onset    Kidney failure Mother 54    Cerebrovascular Disease Father 68    Sudden Death Father 72         in the " doctor's office    Rheumatic fever Sister     No Known Problems Sister     No Known Problems Sister     No Known Problems Sister     No Known Problems Sister     Congenital heart disease Brother 0         at 26    Acute Myocardial Infarction Brother 68    Coronary Artery Disease Brother     CABG Brother 72     Social History      Social History     Tobacco Use    Smoking status: Former     Packs/day: 1.00     Years: 8.00     Pack years: 8.00     Types: Cigarettes     Quit date: 1962     Years since quittin.0    Smokeless tobacco: Never   Substance Use Topics    Alcohol use: Yes     Comment: Alcoholic Drinks/day: rare    Drug use: No        Allergies   No Known Allergies  Prior to Admission Medications      Prior to Admission Medications   Prescriptions Last Dose Informant Patient Reported? Taking?   DOCOSAHEXANOIC ACID/EPA (FISH OIL ORAL) 2023 at am  Yes Yes   Sig: [DOCOSAHEXANOIC ACID/EPA (FISH OIL ORAL)] Take 1,200 mg by mouth daily.   FOLIC ACID/MULTIVIT-MIN/LUTEIN (CENTRUM SILVER ORAL) 2023 at am  Yes Yes   Sig: [FOLIC ACID/MULTIVIT-MIN/LUTEIN (CENTRUM SILVER ORAL)] Take 1 tablet by mouth daily.   Multiple Vitamins-Minerals (CEROVITE SENIOR) TABS 2023 at am  Yes Yes   Sig: Take 1 tablet by mouth daily   OLANZapine (ZYPREXA) 5 MG tablet 2023 at pm  Yes Yes   Sig: Take 5 mg by mouth At Bedtime   VIT C/E/ZN/COPPR/LUTEIN/ZEAXAN (PRESERVISION AREDS 2 ORAL) 2023 at am  Yes Yes   Sig: [VIT C/E/ZN/COPPR/LUTEIN/ZEAXAN (PRESERVISION AREDS 2 ORAL)] Take 1 capsule by mouth 2 (two) times a day.   acetaminophen (TYLENOL) 500 MG tablet Unknown  Yes Yes   Sig: Take 1,000 mg by mouth 3 times daily as needed for pain   aspirin 81 mg chewable tablet 2023 at pm  Yes Yes   Sig: Take 81 mg by mouth every evening    atorvastatin (LIPITOR) 10 MG tablet 2023 at pm  Yes Yes   Sig: Take 10 mg by mouth every evening   azaTHIOprine (IMURAN) 50 mg tablet 2023 at am  Yes Yes   Sig: Take 25  mg by mouth daily    calcium carbonate (OS-KACIE) 1500 (600 Ca) MG tablet 8/30/2023 at am  Yes Yes   Sig: Take 600 mg by mouth daily   carbidopa-levodopa (SINEMET)  MG tablet 8/30/2023 at am  No Yes   Sig: Take 1 tablet by mouth 3 times per day 4 - 5 hours apart.   fludrocortisone (FLORINEF) 0.1 MG tablet 8/30/2023 at am  Yes Yes   Sig: Take 0.2 mg by mouth daily   lidocaine (XYLOCAINE) 5 % external ointment Unknown  Yes Yes   Sig: Apply topically 2 times daily as needed (back pain)   melatonin 5 MG tablet 8/29/2023 at pm  Yes Yes   Sig: Take 5 mg by mouth At Bedtime   senna-docusate (SENOKOT-S/PERICOLACE) 8.6-50 MG tablet Unknown  No Yes   Sig: Take 2 tablets by mouth 2 times daily as needed for constipation      Facility-Administered Medications: None      Review of Systems     A 12 point comprehensive review of systems was negative except as noted above in HPI.    PHYSICAL EXAMINATION       Vitals      Temp:  [97.5  F (36.4  C)] 97.5  F (36.4  C)  Pulse:  [77-95] 92  Resp:  [18-28] 24  BP: (117-140)/(63-87) 122/87  SpO2:  [92 %-96 %] 94 %    Examination     GENERAL:  Alert, appears comfortable, in no acute distress, appears stated age   HEAD:  Normocephalic, without obvious abnormality, atraumatic   EYES:  PERRL, conjunctiva clear,  EOM's intact   NOSE: Nares normal,  mucosa normal, no drainage   THROAT: Lips, mucosa,  gums normal, mouth moist   NECK: Supple, symmetrical, trachea midline   BACK:   Symmetric, no curvature, ROM normal   LUNGS:   Clear to auscultation bilaterally, no rales, rhonchi, or wheezing, symmetric chest rise on inhalation, respirations unlabored   CHEST WALL:  No tenderness or deformity   HEART:  Regular rate and rhythm, S1 and S2 normal, no murmur, rub, or gallop    ABDOMEN:   Soft, non-tender, bowel sounds active  no masses, no organomegaly, no rebound or guarding   EXTREMITIES: 2+ pedal edema, 1+ BLE edema   SKIN: No rashes   NEURO: Alert, oriented x 2, moves all four extremities  freely, non-focal   PSYCH: Cooperative, behavior is appropriate      Pertinent Lab   Most Recent 3 CBC's:  Recent Labs   Lab Test 08/30/23  1151 07/31/23  0923 03/13/23  1130   WBC 7.4 5.8 7.6   HGB 10.8* 12.8* 12.2*   MCV 96 102* 104*    257 225     Most Recent 3 BMP's:  Recent Labs   Lab Test 08/30/23  1151 07/31/23  0923 01/16/23  1303    140 143   POTASSIUM 4.1 4.5 5.0   CHLORIDE 110* 103 103   CO2 26 24 24   BUN 18 19.2 31.1*   CR 0.95 1.20* 1.21*   ANIONGAP 7 13 16*   KACIE 8.2* 9.1 9.0    175* 100*     Most Recent 2 LFT's:  Recent Labs   Lab Test 08/30/23  1151 07/31/23  0923   AST 21 24   ALT <9 6   ALKPHOS 94 99   BILITOTAL 0.8 0.4     Most Recent Urinalysis:  Recent Labs   Lab Test 08/30/23  1215   COLOR Yellow   APPEARANCE Clear   URINEGLC Negative   URINEBILI Negative   URINEKETONE Trace*   SG 1.022   UBLD Negative   URINEPH 6.0   PROTEIN 10*   NITRITE Negative   LEUKEST Negative   RBCU 8*   WBCU 2         Pertinent Radiology     Radiology Results:   Head ct  1.  No evidence of acute intracranial hemorrhage.  2.  No evidence of acute calvarial fracture.  3.  Age-related change.    Lumber spine xray  Straightening of usual lumbar lordosis without significant spondylolisthesis. No acute fracture. Multilevel degenerative change most prominent at L3-L4, L4-L5 L5-S1 where there is moderate disc height loss and anterior osteophyte formation.     Ankle xray  Plantar and Achilles calcaneal spurring. No evidence for fracture. Ankle mortise is intact and symmetric.     CXR  There some underlying mild fibrotic change in both lung bases similar to previous with new patchy infiltrates in the right midlung concerning for a new superimposed pneumonia. Healing fracture distal left clavicle.     EKG Results: Sinus rhythm, heart rate 78/min      Total time spent 70 min      Ashleigh Vazquez MD  [facility]   Phone: #[phone]

## 2023-08-31 NOTE — PROGRESS NOTES
[facility] MEDICINE PROGRESS NOTE      Identification/Summary: Juan Tee is a 87 year old male with a past medical history of  essential hypertension, dyslipidemia, coronary artery disease, polymyositis, Parkinson's disease/Lewy body dementia, CKD 2, bilateral pedal edema, lives at [facility] since 12/2022 came with generalized weakness, recurrent fall.  Found to have community-acquired pneumonia.  Started on IV ceftriaxone and Zithromax.  He is getting agitated easily.  On Zyprexa 5 mg at bedtime chronically.  Started on Ativan solution as needed.  Family is considering hospice.  Hospice consultation requested.  Niece is the contact.    Assessment and Plan:  Generalized weakness and deconditioning  Recurrent fall  Gait instability, was using walker  Physical and Occupational Therapy if able to participate    Acute metabolic encephalopathy/delirium  Recurrent agitation  Resume Zyprexa 5 mg at bedtime  Ativan 0.5 mg solution every 6 hours as needed    Community-acquired pneumonia  Chest x-ray-There some underlying mild fibrotic change in both lung bases similar to previous with new patchy infiltrates in the right midlung concerning for a new superimposed pneumonia.   No URI symptoms  Started on IV ceftriaxone and Zithromax      Parkinson's disease/Lewy body dementia  Carbidopa levodopa 25/100 mg 3 times a day     Essential hypertension  Labile hypertension  Not on antihypertensive  On Florinef 0.2 mg daily  Gentle IV hydration     Dyslipidemia  Lipitor 10 mg daily     Coronary artery disease without angina  Stable and asymptomatic  Resume statin and aspirin.  Not on beta-block    Chronic systolic CHF   Bilateral lower extremity and pedal edema  Echocardiogram EF 40 to 45%, mild to moderate global hypokinesia of left ventricle, moderately dilated right atrium, elevated right ventricular pressure  No acute pulmonary congestion  Not on diuretic at present     CKD 2  Cr is  "stable     Polymyositis  Continue azathioprine     Iron deficiency anemia  IV iron 500 mg once      Clinically Significant Risk Factors Present on Admission         # Hypernatremia: Highest Na = 149 mmol/L in last 2 days, will monitor as appropriate      # Hypoalbuminemia: Lowest albumin = 2.6 g/dL at 8/30/2023 11:51 AM, will monitor as appropriate  # Coagulation Defect: INR = 1.23 (Ref range: 0.85 - 1.15) and/or PTT = N/A, will monitor for bleeding  # Drug Induced Platelet Defect: home medication list includes an antiplatelet medication   # Hypertension: Noted on problem list   # Dementia: noted on problem list    # Overweight: Estimated body mass index is 26.01 kg/m  as calculated from the following:    Height as of this encounter: 1.803 m (5' 11\").    Weight as of this encounter: 84.6 kg (186 lb 8 oz).              Code DNR  Hospice consultation requested  DVT prophylaxis  Subcu heparin 5000 unit twice a day  Barrier to discharge  Awaiting for hospice consult.  Anticipated discharge in 1 to 2-day      Ashleigh Vazquez MD  [facility]  Phone: #[phone]  Securely message with the Vocera Web Console (learn more here)  Text page via [employer] Paging/Directory     Interval History/Subjective:  Was agitated last night.  Refusing to eat.  Getting gentle hydration.  Afebrile.  Niece is present.  Family is considering hospice.  Hospice consultation requested.    Physical Exam/Objective:  Temp:  [97.6  F (36.4  C)-100.4  F (38  C)] 97.9  F (36.6  C)  Pulse:  [] 69  Resp:  [17-38] 20  BP: (100-140)/(57-88) 100/57  SpO2:  [85 %-97 %] 96 %  Body mass index is 26.01 kg/m .    GENERAL:  Alert, appears comfortable, in no acute distress, appears stated age, agitated easily when wake up, sleeping comfortably   HEAD:  Normocephalic, without obvious abnormality, atraumatic   EYES:  PERRL, conjunctiva clear,  EOM's intact   NOSE: Nares normal,  mucosa normal, no drainage "   THROAT: Lips, mucosa,  gums normal, mouth moist   NECK: Supple, symmetrical, trachea midline   BACK:   Symmetric, no curvature, ROM normal   LUNGS:   Clear to auscultation bilaterally, no rales, rhonchi, or wheezing, symmetric chest rise on inhalation, respirations unlabored   CHEST WALL:  No tenderness or deformity   HEART:  Irregular rate and rhythm, S1 and S2 normal, no murmur    ABDOMEN:   Soft, non-tender, bowel sounds active , no masses, no organomegaly, no rebound or guarding   EXTREMITIES: 2+ BLE edema    SKIN: No rashes   NEURO: Alert, not oriented x3, moves all four extremities freely   PSYCH: Agitated easily     Data reviewed today: I personally reviewed all new medications, labs, imaging/diagnostics reports over the past 24 hours. Pertinent findings include:    Imaging:   Head ct  1.  No evidence of acute intracranial hemorrhage.  2.  No evidence of acute calvarial fracture.  3.  Age-related change.     Lumber spine xray  Straightening of usual lumbar lordosis without significant spondylolisthesis. No acute fracture. Multilevel degenerative change most prominent at L3-L4, L4-L5 L5-S1 where there is moderate disc height loss and anterior osteophyte formation.      Ankle xray  Plantar and Achilles calcaneal spurring. No evidence for fracture. Ankle mortise is intact and symmetric.      CXR  There some underlying mild fibrotic change in both lung bases similar to previous with new patchy infiltrates in the right midlung concerning for a new superimposed pneumonia. Healing fracture distal left clavicle.      EKG Results: Sinus rhythm, heart rate 78/min    Echocardiogram  The left ventricle is normal in size.  There is mild concentric left ventricular hypertrophy.  The visual ejection fraction is 40-45%.  There is mild-moderate global hypokinesia of the left ventricle.  The right ventricle is mild to moderately dilated.  The right ventricle is not well visualized.  Mildly decreased right ventricular  systolic function  The right atrium is mild to moderately dilated.  The right ventricular systolic pressure is approximated at 41mmHg plus the  right atrial pressure.  Compared to echo from 1/13/22 the findings are similar. RVSP estimate is now  elevated.    Labs:  Most Recent 3 CBC's:  Recent Labs   Lab Test 08/31/23  0622 08/30/23  1151 07/31/23 0923   WBC 5.7 7.4 5.8   HGB 9.5* 10.8* 12.8*   MCV 98 96 102*    270 257     Most Recent 3 BMP's:  Recent Labs   Lab Test 08/31/23  0622 08/30/23  1151 07/31/23  0923   * 143 140   POTASSIUM 4.1 4.1 4.5   CHLORIDE 113* 110* 103   CO2 25 26 24   BUN 17 18 19.2   CR 0.96 0.95 1.20*   ANIONGAP 11 7 13   KACIE 7.8* 8.2* 9.1   GLC 79 103 175*     Most Recent 2 LFT's:  Recent Labs   Lab Test 08/30/23  1151 07/31/23 0923   AST 21 24   ALT <9 6   ALKPHOS 94 99   BILITOTAL 0.8 0.4       Medications:   Personally Reviewed.  Medications    dextrose 5% and 0.45% NaCl        aspirin  81 mg Oral QPM    atorvastatin  10 mg Oral QPM    azaTHIOprine  25 mg Oral Daily    azithromycin  500 mg Intravenous Q24H    calcium carbonate  500 mg Oral Daily    carbidopa-levodopa  1 tablet Oral TID    cefTRIAXone  1 g Intravenous Q24H    fludrocortisone  0.2 mg Oral Daily    heparin ANTICOAGULANT  5,000 Units Subcutaneous Q12H    iron sucrose  500 mg Intravenous Once    multivitamin  with lutein   Oral BID    multivitamin, therapeutic  1 tablet Oral Daily    OLANZapine  5 mg Oral At Bedtime    polyethylene glycol  17 g Oral Daily    sodium chloride (PF)  3 mL Intracatheter Q8H    sodium chloride (PF)  3 mL Intracatheter Q8H      Total time spent 50 min

## 2023-08-31 NOTE — PROGRESS NOTES
Alert to self. Confused, disoriented. Able to be redirected. Denies pain. O2 stable on 2L via NC. Bed alarm on for safety.

## 2023-08-31 NOTE — PLAN OF CARE
Goal Outcome Evaluation:      Problem: Plan of Care - These are the overarching goals to be used throughout the patient stay.    Goal: Plan of Care Review  Description: The Plan of Care Review/Shift note should be completed every shift.  The Outcome Evaluation is a brief statement about your assessment that the patient is improving, declining, or no change.  This information will be displayed automatically on your shift note.  Outcome: Progressing     Problem: Gas Exchange Impaired  Goal: Optimal Gas Exchange  Outcome: Progressing    Patient denies pain. Temp 100.4, relieved by Tylenol 1000mg.  Pt restless most of night, yet denies pain.  Turned in bed to maintain skin integrity.  Patient alert to self only. O2 sats in the low 90's on 3L O2.

## 2023-08-31 NOTE — PLAN OF CARE
Problem: Gas Exchange Impaired  Goal: Optimal Gas Exchange  Outcome: Progressing  Intervention: Optimize Oxygenation and Ventilation  Recent Flowsheet Documentation  Taken 8/30/2023 1630 by Anaid Kimble RN  Head of Bed (HOB) Positioning: HOB at 20-30 degrees     Pt pleasantly confused. Disoriented x 4. Able to redirect. VSS on 2L NC. Denies pain. Able to make needs known. Up A2 to bedside commode or use urinal in bed. Alarm on bed for safety. Discharge pending clinical progression.

## 2023-09-01 NOTE — PLAN OF CARE
Problem: Plan of Care - These are the overarching goals to be used throughout the patient stay.    Goal: Optimal Comfort and Wellbeing  Outcome: Progressing  Intervention: Monitor Pain and Promote Comfort   Goal Outcome Evaluation:  Pt alert to self, he has been agitated since family left.  He has been pulling at his IV site, pulling his brief, pulling at his gown.  We have since provided him with PRN Haldol to help settle him down.  He is still refusing a gown, refusing oxygen and he remains restless.

## 2023-09-01 NOTE — PROGRESS NOTES
[facility] MEDICINE PROGRESS NOTE      Identification/Summary: Juan Tee is a 87 year old male with a past medical history of  essential hypertension, dyslipidemia, coronary artery disease, polymyositis, Parkinson's disease/Lewy body dementia, CKD 2, bilateral pedal edema, lives at [facility] since 12/2022 came with generalized weakness, recurrent fall.  Found to have community-acquired pneumonia.  Started on IV ceftriaxone and Zithromax.  He is getting agitated easily.  On Zyprexa 5 mg at bedtime chronically.  Started on PO/IV Ativan  as needed.  Family is considering hospice.  Hospice consultation requested.  IV fluid, IV antibiotic, chronic home medications discontinued.  Niece is the contact.    Assessment and Plan:  Generalized weakness and deconditioning  Recurrent fall  Gait instability   Was using walker     Acute metabolic encephalopathy/delirium  Recurrent agitation  Resume Zyprexa 5 mg at bedtime  Ativan 0.5 mg solution every 6 hours as needed  IV Ativan 0.5 mg every 4 hours as needed    Community-acquired pneumonia  Chest x-ray-There some underlying mild fibrotic change in both lung bases similar to previous with new patchy infiltrates in the right midlung concerning for a new superimposed pneumonia.   No URI symptoms  Status post IV ceftriaxone and Zithromax x 2 days..  Discontinued on 9/1 as considering comfort care     Parkinson's disease/Lewy body dementia  Carbidopa levodopa 25/100 mg 3 times a day     Essential hypertension  Labile hypertension  Not on antihypertensive  On Florinef 0.2 mg daily  S/P IV hydration     Dyslipidemia  Lipitor 10 mg daily     Coronary artery disease without angina  Stable and asymptomatic  Resume statin and aspirin.  Not on beta-blocker    Chronic systolic CHF   Bilateral lower extremity and pedal edema  Echocardiogram EF 40 to 45%, mild to moderate global hypokinesia of left ventricle, moderately dilated right atrium, elevated  "right ventricular pressure  No acute pulmonary congestion  Not on diuretic at present     CKD 2  Cr is stable     Polymyositis  Was on azathioprine     Iron deficiency anemia  IV iron 500 mg once      Clinically Significant Risk Factors        # Hypokalemia: Lowest K = 3.3 mmol/L in last 2 days, will replace as needed  # Hypernatremia: Highest Na = 149 mmol/L in last 2 days, will monitor as appropriate      # Hypoalbuminemia: Lowest albumin = 2.6 g/dL at 8/30/2023 11:51 AM, will monitor as appropriate    # Coagulation Defect: INR = 1.23 (Ref range: 0.85 - 1.15) and/or PTT = N/A, will monitor for bleeding        # Hypertension: Noted on problem list     # Dementia: noted on problem list      # Overweight: Estimated body mass index is 26.01 kg/m  as calculated from the following:    Height as of this encounter: 1.803 m (5' 11\").    Weight as of this encounter: 84.6 kg (186 lb 8 oz).  , PRESENT ON ADMISSION            Code DNR  Hospice consultation requested  DVT prophylaxis  Subcu heparin discontinued  Barrier to discharge  Awaiting for hospice consult.  Anticipated discharge in 1 day      Ashleigh Vazquez MD  [facility] Medicine  [facility]  Phone: #[phone]  Securely message with the Vocera Web Console (learn more here)  Text page via [employer] Paging/Directory     Interval History/Subjective:  Resting comfortably.  Refusing to eat.  Afebrile.  Awaiting for hospice consultation.  Spoke with niece.       Physical Exam/Objective:  Temp:  [98.7  F (37.1  C)-99  F (37.2  C)] 99  F (37.2  C)  Pulse:  [] 81  Resp:  [16-26] 24  BP: (121-192)/(57-88) 121/57  SpO2:  [86 %-96 %] 94 %  Body mass index is 26.01 kg/m .    GENERAL:  Alert, appears comfortable, in no acute distress, appears stated age, agitated easily when wake up, sleeping comfortably   HEAD:  Normocephalic, without obvious abnormality, atraumatic   EYES:  PERRL, conjunctiva clear,  EOM's intact   NOSE: Nares normal,  " mucosa normal, no drainage   THROAT: Lips, mucosa,  gums normal, mouth moist   NECK: Supple, symmetrical, trachea midline   BACK:   Symmetric, no curvature, ROM normal   LUNGS:   Clear to auscultation bilaterally, no rales, rhonchi, or wheezing, symmetric chest rise on inhalation, respirations unlabored   CHEST WALL:  No tenderness or deformity   HEART:  Irregular rate and rhythm, S1 and S2 normal, no murmur    ABDOMEN:   Soft, non-tender, bowel sounds active , no masses, no organomegaly, no rebound or guarding   EXTREMITIES: 2+ BLE edema    SKIN: No rashes   NEURO: Alert, not oriented x3, moves all four extremities freely   PSYCH: Agitated easily     Data reviewed today: I personally reviewed all new medications, labs, imaging/diagnostics reports over the past 24 hours. Pertinent findings include:    Imaging:   Head CT  1.  No evidence of acute intracranial hemorrhage.  2.  No evidence of acute calvarial fracture.  3.  Age-related change.     Lumber spine xray  Straightening of usual lumbar lordosis without significant spondylolisthesis. No acute fracture. Multilevel degenerative change most prominent at L3-L4, L4-L5 L5-S1 where there is moderate disc height loss and anterior osteophyte formation.      Ankle xray  Plantar and Achilles calcaneal spurring. No evidence for fracture. Ankle mortise is intact and symmetric.      CXR  There some underlying mild fibrotic change in both lung bases similar to previous with new patchy infiltrates in the right midlung concerning for a new superimposed pneumonia. Healing fracture distal left clavicle.      EKG Results: Sinus rhythm, heart rate 78/min    Echocardiogram  The left ventricle is normal in size.  There is mild concentric left ventricular hypertrophy.  The visual ejection fraction is 40-45%.  There is mild-moderate global hypokinesia of the left ventricle.  The right ventricle is mild to moderately dilated.  The right ventricle is not well visualized.  Mildly  decreased right ventricular systolic function  The right atrium is mild to moderately dilated.  The right ventricular systolic pressure is approximated at 41mmHg plus the  right atrial pressure.  Compared to echo from 1/13/22 the findings are similar. RVSP estimate is now  elevated.    Labs:  Most Recent 3 CBC's:  Recent Labs   Lab Test 08/31/23  0622 08/30/23  1151 07/31/23  0923   WBC 5.7 7.4 5.8   HGB 9.5* 10.8* 12.8*   MCV 98 96 102*    270 257       Most Recent 3 BMP's:  Recent Labs   Lab Test 09/01/23  0735 08/31/23  1500 08/31/23  1436 08/31/23  0622 08/30/23  1151   *  --   --  149* 143   POTASSIUM 3.3*  --   --  4.1 4.1   CHLORIDE 113*  --   --  113* 110*   CO2 24  --   --  25 26   BUN 12  --   --  17 18   CR 0.86  --   --  0.96 0.95   ANIONGAP 10  --   --  11 7   KACIE 7.6*  --   --  7.8* 8.2*    148* 67* 79 103       Most Recent 2 LFT's:  Recent Labs   Lab Test 08/30/23  1151 07/31/23  0923   AST 21 24   ALT <9 6   ALKPHOS 94 99   BILITOTAL 0.8 0.4         Medications:   Personally Reviewed.  Medications        aspirin  81 mg Oral QPM    calcium carbonate  500 mg Oral Daily    carbidopa-levodopa  1 tablet Oral TID    fludrocortisone  0.2 mg Oral Daily    multivitamin  with lutein   Oral BID    multivitamin, therapeutic  1 tablet Oral Daily    OLANZapine  5 mg Oral At Bedtime    polyethylene glycol  17 g Oral Daily    sodium chloride (PF)  3 mL Intracatheter Q8H    sodium chloride (PF)  3 mL Intracatheter Q8H      Total time spent 50 min

## 2023-09-01 NOTE — PROGRESS NOTES
Pt is becoming more agitated, he is pulling his O2 off, he refused to allow it to be put back into place.  He is pulling at his IV.  Provided pt with PRN IV Ativan, will re-approach with O2.      After multiple attempts, pt is refusing O2.  He is continuing to pull the oxygen tubing off his face.  Will continue to attempt at getting oxygen placed on pt via nasal cannula.

## 2023-09-01 NOTE — PROGRESS NOTES
RESPIRATORY CARE NOTE    Pt found sleeping and snoring, 2l nc, became agitated when trying to wake him up for neb tx. Does not follow commands.   Duoneb given via face mask x  BS rhonchi (upper airway) fine crackles on bases pre and post tx    Cont monitoring as needed    Becka Padron  RT

## 2023-09-01 NOTE — PLAN OF CARE
Problem: Plan of Care - These are the overarching goals to be used throughout the patient stay.    Goal: Plan of Care Review  Description: The Plan of Care Review/Shift note should be completed every shift.  The Outcome Evaluation is a brief statement about your assessment that the patient is improving, declining, or no change.  This information will be displayed automatically on your shift note.  Outcome: Progressing     Problem: Risk for Delirium  Goal: Improved Behavioral Control  Outcome: Progressing  Intervention: Minimize Safety Risk  Recent Flowsheet Documentation  Taken 9/1/2023 0002 by Natalie Louie RN  Enhanced Safety Measures: room near unit station   Goal Outcome Evaluation:  Patient alert and oriented to self only. VSS on 2L O2 via NC. Removes NC frequently, PRN Ativan given for restlessness. No pain throughout shift with PAINAD assessment. Left PIV infusing Dextrose 5% and 0.45% NaCl infusing at 75mL/hr. Compression stockings on BLE. Incontinent of bladder and bowel. 4 stools overnight. Redness in groin area, barrier cream applied. Q2 turns. Regular diet. Not out of bed during shift. Discharge pending.

## 2023-09-01 NOTE — CONSULTS
Care Management Follow Up    Length of Stay (days): 2    Expected Discharge Date: 09/2/2023     Concerns to be Addressed:       Patient plan of care discussed at interdisciplinary rounds: Yes    Anticipated Discharge Disposition:  [facility] with Hospice     Additional Information:  CM spoke with provider about discharge planning goals and learned that family would like hospice. CM met with Pt and Niece. After meeting and talking about discharge planning family would like to go with [facility] and would like to change from [facility] [place]. Niece states that she is interested in having transport via [facility] and states understanding about possible private pay.     CM spoke with [facility] on behalf of family and declined to a different hospice.     CM called to [facility] to get the pt back home as needed once Hospice was arranged. Cm spoke to [facility] who stated that they do not take week admits. MESERET spoke with April the Director about taking the pt back over the week. April then transfer the CM back to [facility]  who will update the pt shortly.     MESERET had a message back from [facility] who states that the pt would be able to return over the weekend or when medically ready as needed. Basilia states that the On Call RN for [facility] is Nataliia: [phone] and the weekend fax is [phone]    MESERET updated RN and provider.     4:03 PM  CM spoke with [facility] and they are still waiting for the fax. CM resent as needed. Grecia to assess to confirm that they are able to take the pt as needed.     CM to set up ride as needed once call confirm from [facility]. CM updated RN with this information.     5:02 PM  CM had a call from Basilia saying that they didn't have a order for hospice. Basilia stated that after looking she was able to find an order as needed from the Pt's PCP and sent it to hospice. Basilia stated that she would like 3 days of comfort meds. MESERET updated charge RN as needed. Charge paged provider for  med request.     [facility]: [phone]  [facility] [phone]

## 2023-09-01 NOTE — PLAN OF CARE
Denis is oriented to self only, unable to make his needs known. Speech is garbled, minimal, sometimes unintelligible. He has expiratory grunting, snoring, moaning. Did not ever open eyes this evening. Bilat UE tremors. Removed his O2 mask frequently. MD paged, Ativan PRN given for restlessness as he desats to low 80's without O2. Lung sounds are coarse, junky. Some spitting and swatting hands away, settles more when left alone. No food intake. Gave OJ and crushed meds in OJ with syringe. Some meds held for med burden.    /88, MD paged, hydralazine given, down to 132/64. Facial flushing, afebrile throughout evening. 2 incontinent Bms/voids, tolerated turning. Niece updated.   IV Rocehpin and Azithro given. D 5 1/2 NS running at 75mL/hr. L PIV removed for leakage. R PIV patent.

## 2023-09-01 NOTE — PLAN OF CARE
Goal Outcome Evaluation: [id number]  Problem: Plan of Care - These are the overarching goals to be used throughout the patient stay.    Goal: Absence of Hospital-Acquired Illness or Injury  Intervention: Prevent Skin Injury  Recent Flowsheet Documentation  Taken 9/1/2023 0915 by Chanel Franco, RN  Body Position:   turned   left     Problem: Gas Exchange Impaired  Goal: Optimal Gas Exchange  Outcome: Progressing  Intervention: Optimize Oxygenation and Ventilation  Recent Flowsheet Documentation  Taken 9/1/2023 0755 by Chanel Franco, RN  Head of Bed (HOB) Positioning: HOB at 20-30 degrees     Hard to arouse this morning. Medications held due to concerns with ability to swallow pills. Oral cares done. Agitated with turns. VSS. Fluids running at 75mL/hr. Continues with scheduled nebs and maintaining O2sat with 2L of O2.

## 2023-09-02 PROBLEM — R09.02 HYPOXIA: Status: ACTIVE | Noted: 2023-01-01

## 2023-09-02 NOTE — H&P
[facility] MEDICINE ADMISSION HISTORY AND PHYSICAL     Assessment & Plan       87 year old male for a return to  after being discharged to his local  [facility] care today.  He never was taken off the gurney as when he   Arrived the staff realized he was on oxygen.  Pt was discharged without  Oxygen though en route he became hypoxic.  (85-88%).    Pts hospital stay here was 8/30 to 9/2.  XR chest on 8/30 showed a  Right sided pneumonia.  He was treated with rocephin, azithromycin  Then transitioned to doxycycline.      On return to the ER the patient was stable, conversant and engaging  On 2 liters oxygen.      Pt is being admitted as a bounceback due to hypoxia due to known  Right sided pneumonia.      I called and discussed the situation with his niece Luz Elena who is angry  About the care and return to the ER.  She wants an accepting hospice  For him before he is discharged.      Per my chart review see problem list below.     Problem list:     Acute hypoxic respiratory failure due to pneumonia: continue   To support with oral antibiotics; nebulizer treatments  Pneumonia, right sided:  continue oral doxycycline until  September 7th  HFrEF; chronic: echo on 8/30/2023 with ER of 40%; stable  History of polymyositis: azathioprine  Parkinsons/ Lewy body dementia:  aricept;   CKD2: last creatinine of point 86 on 9/1  Anemia, iron deficient  Dyslipidemia: lipitor  History of encephalopathy: delirium: continue home meds,   Zyprexa  11.  CAD: aspirin, lipitor  12.  Disposition: pt has social issues requiring admission including   Arranging for home oxygen and acceptance into a hospice             Chief Complaint  hypoxia       Past Medical History          Past Medical History:  No date: History of skin cancer  No date: HTN (hypertension)  No date: Infection due to 2019 novel coronavirus     Surgical History     Past Surgical History:   Procedure Laterality Date    HC OPEN RX PATELLA FX      Left  knee Treatment Of Knee Fracture Patellar, Open    HERNIA REPAIR       Family History    Reviewed, and   Family History   Problem Relation Age of Onset    Kidney failure Mother 54    Cerebrovascular Disease Father 68    Sudden Death Father 72         in the doctor's office    Rheumatic fever Sister     No Known Problems Sister     No Known Problems Sister     No Known Problems Sister     No Known Problems Sister     Congenital heart disease Brother 0         at 26    Acute Myocardial Infarction Brother 68    Coronary Artery Disease Brother     CABG Brother 72        Social History      Social History     Tobacco Use    Smoking status: Former     Packs/day: 1.00     Years: 8.00     Pack years: 8.00     Types: Cigarettes     Quit date: 1962     Years since quittin.0    Smokeless tobacco: Never   Substance Use Topics    Alcohol use: Yes     Comment: Alcoholic Drinks/day: rare    Drug use: No        Allergies   No Known Allergies  Prior to Admission Medications      Prior to Admission Medications   Prescriptions Last Dose Informant Patient Reported? Taking?   DOCOSAHEXANOIC ACID/EPA (FISH OIL ORAL) Past Week  Yes Yes   Sig: [DOCOSAHEXANOIC ACID/EPA (FISH OIL ORAL)] Take 1,200 mg by mouth daily.   LORazepam (ATIVAN) 0.5 MG tablet 2023  No Yes   Sig: Take 1 tablet (0.5 mg) by mouth every 6 hours as needed for anxiety   OLANZapine (ZYPREXA) 2.5 MG tablet 2023  No Yes   Sig: Take 1 tablet (2.5 mg) by mouth 3 times daily as needed   OLANZapine (ZYPREXA) 5 MG tablet   Yes Yes   Sig: Take 5 mg by mouth At Bedtime   VIT C/E/ZN/COPPR/LUTEIN/ZEAXAN (PRESERVISION AREDS 2 ORAL) 2023  Yes Yes   Sig: [VIT C/E/ZN/COPPR/LUTEIN/ZEAXAN (PRESERVISION AREDS 2 ORAL)] Take 1 capsule by mouth 2 (two) times a day.   acetaminophen (TYLENOL) 500 MG tablet 2023  Yes Yes   Sig: Take 1,000 mg by mouth 3 times daily as needed for pain   aspirin 81 MG EC tablet 2023 at PM  Yes Yes   Sig: Take 1 tablet (81 mg) by  mouth daily   atorvastatin (LIPITOR) 10 MG tablet 8/30/2023  Yes Yes   Sig: Take 1 tablet (10 mg) by mouth every evening   azaTHIOprine (IMURAN) 50 MG tablet 8/30/2023  Yes Yes   Sig: Take 1 tablet (50 mg) by mouth daily   calcium carbonate (OS-KACIE) 1500 (600 Ca) MG tablet 8/30/2023  Yes Yes   Sig: Take 1 tablet (600 mg) by mouth daily   carbidopa-levodopa (SINEMET)  MG tablet 9/2/2023 at 1400  No Yes   Sig: Take 1 tablet by mouth 3 times per day 4 - 5 hours apart.   donepezil (ARICEPT) 5 MG tablet Past Week  No Yes   Sig: Take 1 tablet (5 mg) by mouth At Bedtime   doxycycline hyclate (VIBRAMYCIN) 100 MG capsule 9/2/2023 at 0930  No Yes   Sig: Take 1 capsule (100 mg) by mouth every 12 hours for 6 days   fludrocortisone (FLORINEF) 0.1 MG tablet 9/2/2023 at 0800  Yes Yes   Sig: Take 0.2 mg by mouth daily   folic acid (FOLVITE) 1 MG tablet Past Week  Yes Yes   Sig: Take 1 tablet (1 mg) by mouth daily   melatonin 5 MG tablet Past Week  Yes Yes   Sig: Take 5 mg by mouth At Bedtime   miconazole (MICATIN) 2 % external powder 9/2/2023  No Yes   Sig: Apply topically 2 times daily as needed for other or itching (rash)   multivitamin (CENTRUM SILVER) tablet 8/31/2023  Yes Yes   Sig: Take 1 tablet by mouth daily   polyethylene glycol (MIRALAX) 17 GM/Dose powder 9/2/2023 at 0800  No Yes   Sig: Take 17 g by mouth daily   potassium chloride (KLOR-CON) 20 MEQ packet 9/2/2023 at 1100  No Yes   Sig: Take 20 mEq by mouth 2 times daily for 2 days   senna-docusate (SENOKOT-S/PERICOLACE) 8.6-50 MG tablet   No Yes   Sig: Take 2 tablets by mouth 2 times daily as needed for constipation      Facility-Administered Medications: None      Review of Systems     A 12 point comprehensive review of systems was negative except as noted above in HPI.    PHYSICAL EXAMINATION       Vitals      Temp:  [98.3  F (36.8  C)-98.6  F (37  C)] 98.6  F (37  C)  Pulse:  [] 97  Resp:  [16-20] 16  BP: (109-138)/(59-85) 137/71  SpO2:  [83 %-95 %] 90  %    Examination     GENERAL:  Alert, appears comfortable, in no acute distress, appears stated age   HEAD:  Normocephalic, without obvious abnormality, atraumatic   EYES:  PERRL, conjunctiva/corneas clear, no scleral icterus, EOM's intact   NOSE: Nares normal, septum midline, mucosa normal, no drainage   THROAT: Lips, mucosa, and tongue normal; teeth and gums normal, mouth moist   NECK: Supple, symmetrical, trachea midline   BACK:   Symmetric, no curvature, ROM normal   LUNGS:   Clear to auscultation bilaterally, no rales, rhonchi, or wheezing, symmetric chest rise on inhalation, respirations unlabored   CHEST WALL:  No tenderness or deformity   HEART:  Regular rate and rhythm, S1 and S2 normal, no murmur, rub, or gallop    ABDOMEN:   Soft, non-tender, bowel sounds active all four quadrants, no masses, no organomegaly, no rebound or guarding   EXTREMITIES: Extremities normal, atraumatic, no cyanosis or edema    SKIN: Dry to touch, no exanthems in the visualized areas   NEURO: Alert, oriented x 4, moves all four extremities freely, non-focal   PSYCH: Cooperative, behavior is appropriate            Pertinent Radiology     Radiology Results: No results found for this or any previous visit (from the past 24 hour(s)).  EKG Results:     Advance Care Planning        Karely Gillis MD  [facility]   Phone: #[phone]

## 2023-09-02 NOTE — DISCHARGE SUMMARY
[facility] MEDICINE  DISCHARGE SUMMARY     Primary Care Physician: Keith Steward  Admission Date: 8/30/2023   Discharge Provider: Ashleigh Vazquez MD Discharge Date: 9/2/2023   Diet:   Regular diet   Code Status: No CPR- Do NOT Intubate   Activity: DCACTIVITY: Activity as tolerated        Condition at Discharge: Stable     REASON FOR PRESENTATION(See Admission Note for Details)   Worsening weakness    PRINCIPAL & ACTIVE DISCHARGE DIAGNOSES   Generalized weakness and deconditioning  Recurrent fall  Gait instability  Acute metabolic encephalopathy/delirium  Recurrent agitation  Community-acquired pneumonia   Coronary artery disease without angina   Chronic systolic CHF   Bilateral lower extremity and pedal edema  Dyslipidemia  Essential hypertension  Labile hypertension  Parkinson's disease/Lewy body dementia   CKD 2   Polymyositis   Iron deficiency anemia   PENDING LABS     Unresulted Labs Ordered in the Past 30 Days of this Admission       Date and Time Order Name Status Description    8/30/2023 11:28 AM Blood Culture Peripheral Blood Preliminary           PROCEDURES ( this hospitalization only)      None    RECOMMENDATIONS TO OUTPATIENT PROVIDER FOR F/U VISIT     Follow-up Appointments     Follow Up and recommended labs and tests      Follow-up with nursing home physician in next rounding  Hospice will follow              DISPOSITION     [facility]    SUMMARY OF HOSPITAL COURSE:      Mr.Richard STONE Tee is a 87 year old male with a past medical history of essential hypertension, dyslipidemia, coronary artery disease, polymyositis, Parkinson's disease/Lewy body dementia, CKD 2, bilateral pedal edema, lives at [facility] since 12/2022 came with generalized weakness, recurrent fall.  Found to have community-acquired pneumonia.  Started on IV ceftriaxone and Zithromax--> doxycycline for 1 week.  He was getting agitated easily.  On Zyprexa 5 mg at bedtime  chronically.  Started on PO/IV Ativan  as needed, IV Haldol as needed. Family is considering hospice.  Hospice consultation requested.  IV fluid, IV antibiotic, chronic home medications discontinued on 9/1. His mentation is much improved.  Patient become more alert, communicative on 9/2.  Appetite improved.  Antibiotic and chronic home medications restarted.  Started on Aricept 5 mg daily for dementia, p.o. Ativan and Zyprexa as needed and 5 mg at bedtime.  Patient will return to memory care nursing home.  Hospice referral at the facility.      Discharge Medications with Med changes:     Current Discharge Medication List        START taking these medications    Details   donepezil (ARICEPT) 5 MG tablet Take 1 tablet (5 mg) by mouth At Bedtime    Associated Diagnoses: Senile dementia (H)      doxycycline hyclate (VIBRAMYCIN) 100 MG capsule Take 1 capsule (100 mg) by mouth every 12 hours for 6 days  Qty: 12 capsule, Refills: 0    Associated Diagnoses: Pneumonia due to infectious organism, unspecified laterality, unspecified part of lung      LORazepam (ATIVAN) 0.5 MG tablet Take 1 tablet (0.5 mg) by mouth every 6 hours as needed for anxiety  Qty: 10 tablet, Refills: 0    Associated Diagnoses: Agitation      miconazole (MICATIN) 2 % external powder Apply topically 2 times daily as needed for other or itching (rash)    Associated Diagnoses: Groin rash      !! OLANZapine (ZYPREXA) 2.5 MG tablet Take 1 tablet (2.5 mg) by mouth 3 times daily as needed    Associated Diagnoses: Terminal illness      polyethylene glycol (MIRALAX) 17 GM/Dose powder Take 17 g by mouth daily  Qty: 238 g    Associated Diagnoses: Terminal illness      potassium chloride (KLOR-CON) 20 MEQ packet Take 20 mEq by mouth 2 times daily for 2 days  Qty: 4 packet, Refills: 0    Associated Diagnoses: Hypokalemia       !! - Potential duplicate medications found. Please discuss with provider.        CONTINUE these medications which have CHANGED    Details    atorvastatin (LIPITOR) 10 MG tablet Take 1 tablet (10 mg) by mouth every evening           CONTINUE these medications which have NOT CHANGED    Details   acetaminophen (TYLENOL) 500 MG tablet Take 1,000 mg by mouth 3 times daily as needed for pain      aspirin 81 MG EC tablet Take 1 tablet (81 mg) by mouth daily      azaTHIOprine (IMURAN) 50 MG tablet Take 1 tablet (50 mg) by mouth daily      calcium carbonate (OS-KACIE) 1500 (600 Ca) MG tablet Take 1 tablet (600 mg) by mouth daily      carbidopa-levodopa (SINEMET)  MG tablet Take 1 tablet by mouth 3 times per day 4 - 5 hours apart.  Qty: 270 tablet, Refills: 3    Associated Diagnoses: Parkinson's disease (H)      DOCOSAHEXANOIC ACID/EPA (FISH OIL ORAL) [DOCOSAHEXANOIC ACID/EPA (FISH OIL ORAL)] Take 1,200 mg by mouth daily.      fludrocortisone (FLORINEF) 0.1 MG tablet Take 0.2 mg by mouth daily      folic acid (FOLVITE) 1 MG tablet Take 1 tablet (1 mg) by mouth daily      melatonin 5 MG tablet Take 5 mg by mouth At Bedtime      multivitamin (CENTRUM SILVER) tablet Take 1 tablet by mouth daily      !! OLANZapine (ZYPREXA) 5 MG tablet Take 5 mg by mouth At Bedtime      senna-docusate (SENOKOT-S/PERICOLACE) 8.6-50 MG tablet Take 2 tablets by mouth 2 times daily as needed for constipation  Qty: 30 tablet, Refills: 1    Associated Diagnoses: Constipation, unspecified constipation type      VIT C/E/ZN/COPPR/LUTEIN/ZEAXAN (PRESERVISION AREDS 2 ORAL) [VIT C/E/ZN/COPPR/LUTEIN/ZEAXAN (PRESERVISION AREDS 2 ORAL)] Take 1 capsule by mouth 2 (two) times a day.       !! - Potential duplicate medications found. Please discuss with provider.        STOP taking these medications       aspirin 81 mg chewable tablet Comments:   Reason for Stopping:         lidocaine (XYLOCAINE) 5 % external ointment Comments:   Reason for Stopping:                     Rationale for medication changes:      Ativan as needed, zyprexa as needed, aricept for agitation  Doxycicline for  Pneumonia        Consults   None  Immunizations given this encounter     Most Recent Immunizations   Administered Date(s) Administered    COVID-19 Monovalent 18+ (Moderna) 12/01/2021    Influenza Vaccine 65+ (FLUAD) 12/01/2021           Anticoagulation Information      None      SIGNIFICANT IMAGING FINDINGS     Results for orders placed or performed during the hospital encounter of 08/30/23   Head CT w/o contrast    Impression    IMPRESSION:  1.  No evidence of acute intracranial hemorrhage.  2.  No evidence of acute calvarial fracture.  3.  Age-related change.   XR Chest 2 Views    Impression    IMPRESSION: There some underlying mild fibrotic change in both lung bases similar to previous with new patchy infiltrates in the right midlung concerning for a new superimposed pneumonia. Healing fracture distal left clavicle.   Lumbar spine XR, 2-3 views    Impression    IMPRESSION: Straightening of usual lumbar lordosis without significant spondylolisthesis. No acute fracture. Multilevel degenerative change most prominent at L3-L4, L4-L5 L5-S1 where there is moderate disc height loss and anterior osteophyte formation.   XR Ankle Left G/E 3 Views    Impression    IMPRESSION: Plantar and Achilles calcaneal spurring. No evidence for fracture. Ankle mortise is intact and symmetric.   Echocardiogram Complete   Result Value Ref Range    LVEF  40-45%      The left ventricle is normal in size.  There is mild concentric left ventricular hypertrophy.  The visual ejection fraction is 40-45%.  There is mild-moderate global hypokinesia of the left ventricle.  The right ventricle is mild to moderately dilated.  The right ventricle is not well visualized.  Mildly decreased right ventricular systolic function  The right atrium is mild to moderately dilated.  The right ventricular systolic pressure is approximated at 41mmHg plus the  right atrial pressure.  Compared to echo from 1/13/22 the findings are similar. RVSP estimate is  now  elevated.    SIGNIFICANT LABORATORY FINDINGS     HGB 9.5  K 3.3  Na 147  Discharge Orders        Hospice Referral      General info for SNF    Length of Stay Estimate: Long Term Care  Condition at Discharge: fair  Level of care:skilled   Rehabilitation Potential: Fair  Admission H&P remains valid and up-to-date: Yes  Recent Chemotherapy: N/A  Use Nursing Home Standing Orders: Yes     Mantoux instructions    Give two-step Mantoux (PPD) Per Facility Policy Yes     Follow Up and recommended labs and tests    Follow-up with nursing home physician in next rounding  Hospice will follow     Reason for your hospital stay    weakness     Activity - Up ad adele     No CPR- Do NOT Intubate     Physical Therapy Adult Consult    Evaluate and treat as clinically indicated.    Reason:  weakness     Occupational Therapy Adult Consult    Evaluate and treat as clinically indicated.    Reason:  weakness     Fall precautions     Diet    Follow this diet upon discharge: regular       Examination   Physical Exam   Temp:  [98.3  F (36.8  C)] 98.3  F (36.8  C)  Pulse:  [82-84] 82  Resp:  [16-20] 16  BP: (109-138)/(59-73) 109/59  SpO2:  [83 %-95 %] 95 %  Wt Readings from Last 1 Encounters:   08/30/23 84.6 kg (186 lb 8 oz)     GENERAL:  Alert, appears comfortable, in no acute distress, appears stated age, agitated easily when wake up, sleeping comfortably   HEAD:  Normocephalic, without obvious abnormality, atraumatic   EYES:  PERRL, conjunctiva clear,  EOM's intact   NOSE: Nares normal,  mucosa normal, no drainage   THROAT: Lips, mucosa,  gums normal, mouth moist   NECK: Supple, symmetrical, trachea midline   BACK:   Symmetric, no curvature, ROM normal   LUNGS:   Clear to auscultation bilaterally, no rales, rhonchi, or wheezing, symmetric chest rise on inhalation, respirations unlabored   CHEST WALL:  No tenderness or deformity   HEART:  Irregular rate and rhythm, S1 and S2 normal, no murmur    ABDOMEN:   Soft, non-tender, bowel sounds  active , no masses, no organomegaly, no rebound or guarding   EXTREMITIES: 2+ BLE edema    SKIN: No rashes   NEURO: Alert, not oriented x3, moves all four extremities freely   PSYCH: Agitated easily         Please see EMR for more detailed significant labs, imaging, consultant notes etc.    IAshleigh MD, personally saw the patient today and spent greater than 30 minutes discharging this patient.    Ashleigh Vazquez MD  [facility]    CC:Keith Steward

## 2023-09-02 NOTE — PHARMACY-ADMISSION MEDICATION HISTORY
Pharmacist Admission Medication History    Admission medication history is complete. The information provided in this note is only as accurate as the sources available at the time of the update.    Medication reconciliation/reorder completed by provider prior to medication history? No    Information Source(s): Hospital records and CareEverywhere/SureScripts via N/A    Pertinent Information:     Patient discharged from [facility] earlier today, 9/2/23. Returned to hospital due to confusion with oxygen orders. Never took any medications outside of hospitalization this am. Last doses taken from hospital admission MAR.     Changes made to PTA medication list:  Added: doxycyline x 6 days (9/2/23 - 9/7/23)  Deleted: None  Changed: None    Medication History Completed By: Mamta Wasserman, PharmD, [facility]   9/2/2023 4:43 PM    Prior to Admission medications    Medication Sig Last Dose Taking? Auth Provider Long Term End Date   acetaminophen (TYLENOL) 500 MG tablet Take 1,000 mg by mouth 3 times daily as needed for pain 8/31/2023 Yes Unknown, Entered By History     aspirin 81 MG EC tablet Take 1 tablet (81 mg) by mouth daily 9/1/2023 at PM Yes Ashleigh Vazquez MD No    atorvastatin (LIPITOR) 10 MG tablet Take 1 tablet (10 mg) by mouth every evening 8/30/2023 Yes Ashleigh Vazquez MD Yes    azaTHIOprine (IMURAN) 50 MG tablet Take 1 tablet (50 mg) by mouth daily 8/30/2023 Yes Ashleigh Vazquez MD Yes    calcium carbonate (OS-KACIE) 1500 (600 Ca) MG tablet Take 1 tablet (600 mg) by mouth daily 8/30/2023 Yes Ashleigh Vazquez MD No    carbidopa-levodopa (SINEMET)  MG tablet Take 1 tablet by mouth 3 times per day 4 - 5 hours apart. 9/2/2023 at 1400 Yes Antonella Kaminski APRN CNP Yes    DOCOSAHEXANOIC ACID/EPA (FISH OIL ORAL) [DOCOSAHEXANOIC ACID/EPA (FISH OIL ORAL)] Take 1,200 mg by mouth daily. Past Week Yes Provider, Historical     donepezil (ARICEPT) 5 MG tablet Take 1 tablet (5 mg) by mouth At Bedtime Past Week Yes Taylor  MD Ashleigh     doxycycline hyclate (VIBRAMYCIN) 100 MG capsule Take 1 capsule (100 mg) by mouth every 12 hours for 6 days 9/2/2023 at 0930 Yes Ashleigh Vazquez MD  9/8/23   fludrocortisone (FLORINEF) 0.1 MG tablet Take 0.2 mg by mouth daily 9/2/2023 at 0800 Yes Unknown, Entered By History Yes    folic acid (FOLVITE) 1 MG tablet Take 1 tablet (1 mg) by mouth daily Past Week Yes Ashleigh Vazquez MD     LORazepam (ATIVAN) 0.5 MG tablet Take 1 tablet (0.5 mg) by mouth every 6 hours as needed for anxiety 9/1/2023 Yes Ashleigh Vazquez MD No    melatonin 5 MG tablet Take 5 mg by mouth At Bedtime Past Week Yes Unknown, Entered By History     miconazole (MICATIN) 2 % external powder Apply topically 2 times daily as needed for other or itching (rash) 9/2/2023 Yes Ashleigh Vazquez MD     multivitamin (CENTRUM SILVER) tablet Take 1 tablet by mouth daily 8/31/2023 Yes Ashleigh Vazquez MD     OLANZapine (ZYPREXA) 2.5 MG tablet Take 1 tablet (2.5 mg) by mouth 3 times daily as needed 9/1/2023 Yes Ashleigh Vazquez MD Yes    OLANZapine (ZYPREXA) 5 MG tablet Take 5 mg by mouth At Bedtime  Yes Unknown, Entered By History Yes    polyethylene glycol (MIRALAX) 17 GM/Dose powder Take 17 g by mouth daily 9/2/2023 at 0800 Yes Ashleigh Vazquez MD     potassium chloride (KLOR-CON) 20 MEQ packet Take 20 mEq by mouth 2 times daily for 2 days 9/2/2023 at 1100 Yes Ashleigh Vazquez MD  9/4/23   senna-docusate (SENOKOT-S/PERICOLACE) 8.6-50 MG tablet Take 2 tablets by mouth 2 times daily as needed for constipation  Yes Edmond Davis DO     VIT C/E/ZN/COPPR/LUTEIN/ZEAXAN (PRESERVISION AREDS 2 ORAL) [VIT C/E/ZN/COPPR/LUTEIN/ZEAXAN (PRESERVISION AREDS 2 ORAL)] Take 1 capsule by mouth 2 (two) times a day. 9/1/2023 Yes Provider, Historical

## 2023-09-02 NOTE — PLAN OF CARE
Goal Outcome Evaluation:  Pt discharging back to [facility], provided report to Nataliia.  Pt is alert to self.  PIV removed prior to discharge.  He ambulated to the stretcher and with a walker and gait belt A1.

## 2023-09-02 NOTE — PROGRESS NOTES
Called Nataliia at [facility] to provide report on this pt returning home.  She did not answer, left a voicemail at this time requesting she call back for report.       @11:55a Nataliia from [facility] returned call to the unit, provided her with report.  She is awaiting the orders be faxed and prescriptions sent to [facility] .

## 2023-09-02 NOTE — PROGRESS NOTES
09/02/23 0930   Appointment Info   Signing Clinician's Name / Credentials (OT) Frankie Marie OTR/L   Living Environment   People in Home other (see comments)  (Memory Care Staff)   Current Living Arrangements other (see comments)  (Memory Care)   Home Accessibility no concerns   Transportation Anticipated agency   Living Environment Comments WIS with Shower Cecelia - roll in, Std toilet with Bilat GB   Self-Care   Usual Activity Tolerance moderate   Current Activity Tolerance fair   Activity/Exercise/Self-Care Comment Family stated his memory care staff assisst him with all ADLs and trsfs.   Instrumental Activities of Daily Living (IADL)   IADL Comments Memory Care Staff do all IADLs   General Information   Onset of Illness/Injury or Date of Surgery 09/30/23   Referring Physician Dr. Ashleigh Vazquez   Patient/Family Therapy Goal Statement (OT) None by Pt.   Additional Occupational Profile Info/Pertinent History of Current Problem Adm with weakness.  PMH:  Parkinson's, Lewy Body Dementia, HTN, Dyslip., CAD, CHF. CKD - 2, Anxiety   Existing Precautions/Restrictions fall   Cognitive Status Examination   Orientation Status person   Follows Commands follows one-step commands;50-74% accuracy   Visual Perception   Visual Impairment/Limitations corrective lenses full-time  (at home.)   Coordination   Upper Extremity Coordination Left UE impaired;Right UE impaired   Bed Mobility   Bed Mobility supine-sit   Supine-Sit [place] (Bed Mobility) maximum assist (25% patient effort);2 person assist   Assistive Device (Bed Mobility) bed rails   Transfers   Transfers sit-stand transfer;toilet transfer;bed-chair transfer   Transfer Skill: Bed to Chair/Chair to Bed   Bed-Chair [place] (Transfers) minimum assist (75% patient effort);2 person assist   Sit-Stand Transfer   Sit-Stand [place] (Transfers) 2 person assist;minimum assist (75% patient effort)   Assistive Device (Sit-Stand Transfers) walker, front-wheeled   Toilet  Transfer   Type (Toilet Transfer) sit-stand;stand-sit   [place] Level (Toilet Transfer) minimum assist (75% patient effort);2 person assist   Assistive Device (Toilet Transfer) walker, front-wheeled   Balance   Balance Assessment standing balance: dynamic   Balance Comments fair   Activities of Daily Living   BADL Assessment/Intervention lower body dressing   Lower Body Dressing Assessment/Training   Position (Lower Body Dressing) supported sitting;supported standing   [place] Level (Lower Body Dressing) dependent (less than 25% patient effort)   Clinical Impression   Criteria for Skilled Therapeutic Interventions Met (OT) Yes, treatment indicated   OT Diagnosis decreased indep with ADLs and trsfs due to pneumonia.   OT Problem List-Impairments impacting ADL problems related to;cognition;mobility   Assessment of Occupational Performance 3-5 Performance Deficits   Identified Performance Deficits dressing, toileting, bathing, G/H and trsfs.   Planned Therapy Interventions (OT) ADL retraining   Clinical Decision Making Complexity (OT) moderate complexity   Risk & Benefits of therapy have been explained evaluation/treatment results reviewed;participants included;patient;guardian  ([place] family)   OT Total Evaluation Time   OT Eval, Moderate Complexity Minutes (77631) 15   OT Goals   Therapy Frequency (OT) Daily   OT Predicted Duration/Target Date for Goal Attainment 09/06/23   OT Goals Hygiene/Grooming;Lower Body Dressing;Transfers   OT: Hygiene/Grooming from wheelchair;minimal assist   OT: Lower Body Dressing Minimal assist   OT: Transfer Minimal assist   Interventions   Interventions Quick Adds Self-Care/Home Management   Self-Care/Home Management   Self-Care/Home Mgmt/ADL, Compensatory, Meal Prep Minutes (27691) 45   Symptoms Noted During/After Treatment (Meal Preparation/Planning Training) fatigue;shortness of breath   Treatment Detail/Skilled Intervention Pt in bed when therapist arrived.  Pt needed min  A of 2 to trsf from supine to sitting EOB.  O2 NC on at 2L with sats 90%.  Pt desats with activity, boosted O2 up to 4L as Pt dropped into the 70's with activity.  Pt trsf from bed to BR toilet with Min A to Mod A of 2 using FWW.  Placed commode overlay over the toilet for easier trsf.  Pt needed VC for hand placement and for saftey.  Pt needed extra time for BM.  Max A of 2 for maureen care and clothing management.  Stood at sink to wash his hands with Mod A of one.  Needed step by step cues to move walker to exit BR.  Trsf to chair with Mod A of 2.  Pt fatigued and needed chair moved to meet him.  Sat to rest for 5 min. prior to standing to adjust brief.  Upon sitting, Pt reported he needed to use the toilet again.  Trsf from chair to commode next to chair with mod A of 2.  Pt had another BM, needing assist to complete maureen care and clothing management again.  Mod A of 2 for sit to stand from commode and return to chair.  Chair alarm on and call light within reach.  NA present when therapist left the room.   OT Discharge Planning   OT Plan Basic ADLs and close trsfs with assist of 2.   OT Discharge Recommendation (DC Rec) home with assist  (return to memory care.)   OT Rationale for DC Rec Pt will need 24 hour care.  Will benefit from further therapy to increase indep with trsfs to return to baseline mobility for ADLs.   OT Brief overview of current status Min To Mod A of 2 for trsfs using FWW.  Pt oriented to self.   Total Session Time   Timed Code Treatment Minutes 45   Total Session Time (sum of timed and untimed services) 60

## 2023-09-02 NOTE — ED NOTES
Pt oxygen fluctuating between high 80's - low 90's. Pt was originally on 2L oxygen via nasal canula by ems, this writer had to increase oxygen to 4 liters via NC to keep his oxygen saturations >93%

## 2023-09-02 NOTE — CONSULTS
Care Management Initial Consult    General Information  Assessment completed with: Family, niece/HCA Luz Elena via phone  Type of CM/SW Visit: Initial Assessment    Primary Care Provider verified and updated as needed: Yes- CM updated in Epic (now has [facility])    Readmission within the last 30 days:  Discharged from [facility] 9/2 and returned to ER         Advance Care Planning: Advance Care Planning Reviewed: present on chart       Communication Assessment  Patient's communication style: spoken language (English or Bilingual)       Cognitive  Cognitive/Neuro/Behavioral: not assessed by CM at this time.     Living Environment:   People in home: facility resident     Current living Arrangements: assisted living ([facility] in [place])  Name of Facility: [facility] in [place]   Able to return to prior arrangements: yes (family/HCA requesting return to [facility] with hospice services in place)     Family/Social Support:  Care provided by:  ([facility] staff, niece and nephew make decisions and handle finances)  Provides care for: no one, unable/limited ability to care for self  Marital Status:   Facility resident(s)/Staff, Other (specify) (Niece Luz Elena and her  Ziyad. [facility] and [facility].)          Description of Support System: Supportive, Involved       Current Resources:   Patient receiving home care services: No  Community Resources:  (resides at [facility] and managed by [facility]. Pending hospice referral.)  Equipment/supplies currently used at home: Incontinence Supplies, Compression Stockings (cane, walker, Rx glasses)    Employment/Financial:  Employment Status: retired     Employment/ Comments: was in  but is not VA connected  Financial Concerns: No concerns identified   Referral to Financial Worker: No     Does the patient's insurance plan  "have a 3 day qualifying hospital stay waiver?  Yes   Will the waiver be used for post-acute placement? No- unable to confirm at this time. Is Medicare ACO REACH eligible and also had 3 day IP stay 8/30-9/2 but do not anticipate need for TCU placement. At this time family/HCA requesting return to [facility] with added hospice services.     Lifestyle & Psychosocial Needs:  Social Determinants of Health     Tobacco Use: Medium Risk (9/2/2023)    Patient History     Smoking Tobacco Use: Former     Smokeless Tobacco Use: Never     Passive Exposure: Not on file   Alcohol Use: Not on file   Financial Resource Strain: Not on file   Food Insecurity: Not on file   Transportation Needs: Not on file   Physical Activity: Not on file   Stress: Not on file   Social Connections: Not on file   Intimate Partner Violence: Not on file   Depression: Not at risk (6/13/2022)    PHQ-2     PHQ-2 Score: 0   Housing Stability: Not on file     Functional Status:  Prior to admission patient needed assistance:   Dependent ADLs:: Ambulation-walker (needs help or reminders with all cares)  Dependent IADLs:: Cleaning, Cooking, Laundry, Shopping, Meal Preparation, Medication Management, Money Management, Transportation (Memory Care staff manage his cares. Niece and her  are HCA's and financial POA's.)     Mental Health Status:  Mental Health Status:  (hx of lewy body dementia and does get hallucinations. Managed by [facility].)       Chemical Dependency Status:  Chemical Dependency Status:  (no concerns)           Values/Beliefs:  Spiritual, Cultural Beliefs, Rastafarian Practices, Values that affect care: no          Values/Beliefs Comment: \"Advent\"    Additional Information:  Chart reviewed.     MESERET provided MOON/observation notice to niece/HCA Luz Elena via phone. Has Medicare and [place] supplement. She verbalized understanding. Original in chart and copy left in patient room.     MESERET completed assessment with " kyra/STEPHY Laguna via phone. Luz Elena states preference for [place] [facility] [place] services in place and DME confirmed. She is agreeable to any hospice agency other than [facility] that can provide services at [facility]. She would be available to meet with hospice tomorrow, Maco 9/3 at 10am or anytime thereafter. Maria Del Carmentom Laguna states her and her  are working this evening and will not be available via phone but provided CM an alternate contact for her daughter in law ([phone]) if able to confirm anything this evening for tomorrow. Kyra requesting home 02, hospital bed and wheelchair/recliner/transport chair for patient to use when going out into common areas.     [facility], on call Nurse ([phone]) confirmed with Director of Nursing that patient can return to [facility] over the weekend if hospice services in place and appropriate DME available/ordered. Can use any hospice agency.   On call Nurse for Sunday will be Laura [phone]    [facility]  Ruth, on call Nurse ([phone] option #2) states unable to do an urgent hospital enrollment over the holiday weekend. Would not be able to evaluate or get DME until Tuesday 9/5 at soonest.     [facility]  Juan, on call answering service states Triage RN will return call to  to confirm if agency would be able to meet with family at hospital tomorrow, Maco 9/3 around 10am per family request.   Aruna in intake returned call to  and states a Nurse can call kyra in the morning and agency can accommodate a hospice admission at facility tomorrow. They can order equipment this evening to be delivered tomorrow to [facility] prior to patient discharging from hospital. CM informed of DME requested by kyra/STEPHY Laguna (02, hospital bed, wheelchair/recliner/transport chair). Aruna will return call to  to confirm  what time a nurse will call niece/HCA Luz Elena in the morning to review services and what time the agency will be able to do start of care visit for hospice services at [facility] tomorrow.   Aruna returned call and confirmed hospital bed and 02 will be delivered overnight (early AM hours) and Nurse will call kyra Laguna around 9am and then Nurse can meet with kyra Laguna at [facility] around 11am for start of care visit. Requesting 4 day supply of discharge medications be sent to patient pharmacy. Estrella with [facility] hospice also confirmed plan. They are unable to order the wheelchair/recliner/transport chair until Nurse assesses patient during start of care visit but do not anticipate any issues with getting that.     CM updated HCA/niece Luz Elena's daughter in law Debbie and son Jagdish per request ([phone]); they confirm Luz Elena is OK with plan to proceed with return to [facility] tomorrow with [facility] hospice services.     CM spoke to Nataliia on call Nurse at [facility] and she confirms OK to plan for return to facility tomorrow morning with [facility] hospice. She will update the facility of anticipated DME delivery overnight tonight.     HE stretcher tentatively arranged for 10-10:30am tomorrow, Maco 9/3. CM completed PCS. CM updated family regarding transport time.     CM to confirm 02 and hospital bed delivered prior to patient leaving [facility] tomorrow.     Kay Hernadez RN

## 2023-09-02 NOTE — ED PROVIDER NOTES
EMERGENCY DEPARTMENT ENCOUNTER      NAME: Juan Tee  AGE: 87 year old male  YOB: 1935  MRN: [id number]  EVALUATION DATE & TIME: 9/2/2023  3:41 PM    PCP: Keith Steward    ED PROVIDER: Nyasia Christianson M.D.        Chief Complaint   Patient presents with    Shortness of Breath         FINAL IMPRESSION:    1. Hypoxia            MEDICAL DECISION MAKING:    Juan Tee is a 87 year old male with history of hypertension, dyslipidemia, CAD, polymyositis, Parkinson's disease/Lewy body dementia, CKD, dementia living in memory care  Who presents to the ER with complaints of requiring oxygen.    Patient was just discharged from the hospital few hours ago for which she was hospitalized after a fall and found to have community-acquired pneumonia.  He required oxygen intermittently while in the hospital.  He was discharged back to his memory care unit today.  Reportedly not requiring oxygen and today in the hospital but when he got to the care center was found to have saturations in the upper 80s and required 2 L nasal cannula oxygen.  Therefore sent back to the emergency department as he was not set up to have as needed oxygen at his care center.  Here in the ER he has not made distress.  He is requiring 2 L nasal cannula oxygen.  He was readmitted back to the hospitalist service for reevaluation and to help set up services that may be needed once he goes back to his memory care center including oxygen available as needed.          ED COURSE:  4:10 PM  I met with the patient to gather history and perform my exam. ED course and treatment discussed.  Family is not present at bedside.    4:34 PM  Spoke with care manager briefly about getting oxygen rep to see pt in ER and she did not think that this was a valid option. Spoke ED charge nurse and plan is admit back to hospitalist care team.    4:45 PM  Spoke with Dr. Gillis of the hospitalist service and patient has been accepted  back to their service.  We will go back to [facility] at this time.  He will be kept on oxygen as needed.  Right now he is requiring 2 L nasal cannula oxygen.  He does have dementia but right now is pleasant, cooperative, alert and interactive.  He denies any concerns or complaints at this time.  I do not think he requires emergent repeat testing at this time and will defer any ongoing management to the admitting service.    At this time I feel it is best left to the admitting service to decide if any testing is warranted versus just trying to get the needed services set up prior to discharge back to his care center.  He is not in any distress.  There has been talks about hospice and even comfort measures will defer this to the admitting service as well.  None of this appears to be needed to clarify here in the emergency room at this time.  He is known to be DNR.    I do not think this represents an acute worsening of his known pneumonia, CHF, COPD, or other life-threatening process at this time.  I do not think he requires life-threatening reversal of any current conditions other than just supplemental oxygen placement.    At the conclusion of the encounter I discussed the results of all of the tests and the disposition. Their questions were answered. The patient (and any family present) acknowledged understanding and were agreeable with the care plan.      CONSULTANTS:  Care Manager        MEDICATIONS GIVEN IN THE EMERGENCY:  Medications - No data to display        NEW PRESCRIPTIONS STARTED AT TODAY'S ER VISIT     Medication List      There are no discharge medications for this visit.             CONDITION:  stable        DISPOSITION:  Med surg as accepted by Dr. Gillis, hospitalist         =================================================================  =================================================================  TRIAGE ASSESSMENT:   Arrives via EMS after being discharged from [address] to [facility]  living facility. However, when EMS arrived to [facility] the would not take pt because oxygen had not been ordered or set up at facility from hospital. Pt using oxygen as needed. Per EMS, pt oxygen sats without oxygen were in the 80's. Placed on 2 L of oxygen and sats increased to >93%.      Triage Assessment       Row Name 09/02/23 1548       Triage Assessment (Adult)    Airway WDL WDL       Respiratory WDL    Respiratory WDL rhythm/pattern    Rhythm/Pattern, Respiratory shortness of breath       Breath Sounds    Breath Sounds All Fields    All Lung Fields Breath Sounds diminished       Skin Circulation/Temperature WDL    Skin Circulation/Temperature WDL WDL       Cardiac WDL    Cardiac WDL WDL       Peripheral/Neurovascular WDL    Peripheral Neurovascular WDL pulse assessment;neurovascular assessment lower    Pulse Assessment dorsalis pedis       LLE Neurovascular Assessment    Temperature LLE warm    Color LLE blotchy       RLE Neurovascular Assessment    Temperature RLE warm    Color RLE blotchy       Pulse Dorsalis Pedis    Left Dorsalis Pedis Pulse 1+ (weak)    Right Dorsalis Pedis Pulse 1+ (weak)       Cognitive/Neuro/Behavioral WDL    Cognitive/Neuro/Behavioral WDL orientation    Orientation disoriented to;place;time;situation                     ED Triage Vitals [09/02/23 5358]   Enc Vitals Group      /64      Pulse 88      Resp 16      Temp 98.6  F (37  C)      Temp src Temporal      SpO2 93 %           ================================================================  ================================================================    HPI    Patient information was obtained from: patient and EMS    Use of Intrepreter: N/A      Juan Tee is a 87 year old male with history of hypertension, dyslipidemia, CAD, polymyositis, Parkinson's disease/Lewy body dementia, CKD, dementia living in memory care  Who presents to the ER with complaints of requiring oxygen.    Patient had just been discharged  from the hospital a few hours ago after being admitted after a fall and found to have community-acquired pneumonia.  He was treated with IV ceftriaxone and Zithromax and transition to doxycycline.  Family was considering hospice but while in the hospital patient did have some improvement and hospice was deferred to the his usual memory care facility.     Patient reportedly sent to his memory care facility with EMS today who reports that his oxygen saturations were in the 80s and he was placed back on oxygen.  Sound like he had required oxygen on and off while in the hospital but at the time of discharge was not requiring oxygen.  Upon arrival to his [facility] care center they would not accept him as they did not have oxygen available for him should he need it, which it sounds like he was at the time of EMS transport.  He was sent to the emergency department.    Chart review of progress notes from earlier today suggest that there was some consideration for possible comfort cares.    Here in the ER patient is on 2 L nasal cannula oxygen.  He himself has no complaints such as chest pain, difficulty breathing, or abdominal pain.  Family did accompany the patient to ER but left prior to my evaluation of the patient.  Plan at this time is to have care manager, nursing supervisor, and team come up with a plan for this patient.  He is not requiring anything emergent otherwise at this moment.      REVIEW OF SYSTEMS  Review of Systems   Respiratory:  Negative for shortness of breath.    Cardiovascular:  Negative for chest pain.   Gastrointestinal:  Negative for abdominal pain.       CODE STATUS:  DNR per chart review      PAST MEDICAL HISTORY:  Past Medical History:   Diagnosis Date    History of skin cancer     HTN (hypertension)     Infection due to 2019 novel coronavirus          PAST SURGICAL HISTORY:  Past Surgical History:   Procedure Laterality Date    HC OPEN RX PATELLA FX      Left knee Treatment Of Knee Fracture  Patellar, Open    HERNIA REPAIR           CURRENT MEDICATIONS:    Prior to Admission medications    Medication Sig Start Date End Date Taking? Authorizing Provider   acetaminophen (TYLENOL) 500 MG tablet Take 1,000 mg by mouth 3 times daily as needed for pain    Unknown, Entered By History   aspirin 81 MG EC tablet Take 1 tablet (81 mg) by mouth daily 9/2/23   Ashleigh Vazquez MD   atorvastatin (LIPITOR) 10 MG tablet Take 1 tablet (10 mg) by mouth every evening 9/2/23   Ashleigh Vazquez MD   azaTHIOprine (IMURAN) 50 MG tablet Take 1 tablet (50 mg) by mouth daily 9/2/23   Ashleigh Vazquez MD   calcium carbonate (OS-KACIE) 1500 (600 Ca) MG tablet Take 1 tablet (600 mg) by mouth daily 9/2/23   Ashleigh Vazquez MD   carbidopa-levodopa (SINEMET)  MG tablet Take 1 tablet by mouth 3 times per day 4 - 5 hours apart. 12/30/22   Antonella Kaminski APRN CNP   DOCOSAHEXANOIC ACID/EPA (FISH OIL ORAL) [DOCOSAHEXANOIC ACID/EPA (FISH OIL ORAL)] Take 1,200 mg by mouth daily. 11/5/15   Provider, Historical   donepezil (ARICEPT) 5 MG tablet Take 1 tablet (5 mg) by mouth At Bedtime 9/2/23   Ashleigh Vazquez MD   doxycycline hyclate (VIBRAMYCIN) 100 MG capsule Take 1 capsule (100 mg) by mouth every 12 hours for 6 days 9/2/23 9/8/23  Ashleigh Vazquez MD   fludrocortisone (FLORINEF) 0.1 MG tablet Take 0.2 mg by mouth daily    Unknown, Entered By History   folic acid (FOLVITE) 1 MG tablet Take 1 tablet (1 mg) by mouth daily 9/2/23   Ashleigh Vazquez MD   LORazepam (ATIVAN) 0.5 MG tablet Take 1 tablet (0.5 mg) by mouth every 6 hours as needed for anxiety 9/2/23   Ashleigh Vazquez MD   melatonin 5 MG tablet Take 5 mg by mouth At Bedtime    Unknown, Entered By History   miconazole (MICATIN) 2 % external powder Apply topically 2 times daily as needed for other or itching (rash) 9/2/23   Ashleigh Vazquez MD   multivitamin (CENTRUM SILVER) tablet Take 1 tablet by mouth daily 9/2/23   Ashleigh Vazquez MD   OLANZapine (ZYPREXA) 2.5 MG tablet Take 1  tablet (2.5 mg) by mouth 3 times daily as needed 23   Ashleigh Vazquez MD   OLANZapine (ZYPREXA) 5 MG tablet Take 5 mg by mouth At Bedtime    Unknown, Entered By History   polyethylene glycol (MIRALAX) 17 GM/Dose powder Take 17 g by mouth daily 23   Ashleigh Vazquez MD   potassium chloride (KLOR-CON) 20 MEQ packet Take 20 mEq by mouth 2 times daily for 2 days 23  Ashleigh Vazquez MD   senna-docusate (SENOKOT-S/PERICOLACE) 8.6-50 MG tablet Take 2 tablets by mouth 2 times daily as needed for constipation 22   Edmond Davis DO   VIT C/E/ZN/COPPR/LUTEIN/ZEAXAN (PRESERVISION AREDS 2 ORAL) [VIT C/E/ZN/COPPR/LUTEIN/ZEAXAN (PRESERVISION AREDS 2 ORAL)] Take 1 capsule by mouth 2 (two) times a day. 11/5/15   Provider, Historical         ALLERGIES:  No Known Allergies      FAMILY HISTORY:  Family History   Problem Relation Age of Onset    Kidney failure Mother 54    Cerebrovascular Disease Father 68    Sudden Death Father 72         in the doctor's office    Rheumatic fever Sister     No Known Problems Sister     No Known Problems Sister     No Known Problems Sister     No Known Problems Sister     Congenital heart disease Brother 0         at 26    Acute Myocardial Infarction Brother 68    Coronary Artery Disease Brother     CABG Brother 72         SOCIAL HISTORY:  Social History     Socioeconomic History    Marital status: Single    Number of children: 0    Years of education: 12   Tobacco Use    Smoking status: Former     Packs/day: 1.00     Years: 8.00     Pack years: 8.00     Types: Cigarettes     Quit date: 1962     Years since quittin.0    Smokeless tobacco: Never   Substance and Sexual Activity    Alcohol use: Yes     Comment: Alcoholic Drinks/day: rare    Drug use: No    Sexual activity: Never   Social History Narrative    Lived in sister-in-law's house, who passed away in .    Never .    Lives with niece, Krista, and nephew.         VITALS:  Patient Vitals for  the past 24 hrs:   BP Temp Temp src Pulse Resp SpO2   09/02/23 1630 -- -- -- 83 -- 93 %   09/02/23 1615 113/82 -- -- 100 -- 90 %   09/02/23 1600 115/79 -- -- 85 -- 91 %   09/02/23 1545 119/64 98.6  F (37  C) Temporal 88 16 93 %       Wt Readings from Last 3 Encounters:   08/30/23 84.6 kg (186 lb 8 oz)   01/02/23 71.7 kg (158 lb)   12/23/22 70.3 kg (155 lb)       Estimated Creatinine Clearance: 73.8 mL/min (based on SCr of 0.86 mg/dL).    PHYSICAL EXAM    Constitutional:  Well developed, Well nourished, NAD, GCS 15  HENT:  Normocephalic, Atraumatic, Bilateral external ears normal,  Nose normal. Neck- Supple, No stridor.   Eyes:  PERRL, EOMI, Conjunctiva normal, No discharge.  Respiratory:  Normal breath sounds, No respiratory distress, No wheezing, Speaks full sentences easily. No cough.   Cardiovascular:  Normal heart rate, Regular rhythm, No rubs, No gallops. Chest wall nontender.   GI:  No excessive obesity.  Bowel sounds normal, Soft, No tenderness, No masses, No flank tenderness. No rebound or guarding.   : deferred  Musculoskeletal:  No cyanosis, No clubbing. Good range of motion in all major joints. No major deformities noted.   Integument:  Warm, Dry, No erythema, No rash.  No petechiae.   Neurologic:  Alert & pleasantly interactive  Psychiatric:  Affect normal, Cooperative         LAB:  All pertinent labs reviewed and interpreted.  Recent Results (from the past 24 hour(s))   Platelet count    Collection Time: 09/02/23  5:48 AM   Result Value Ref Range    Platelet Count 270 150 - 450 10e3/uL       No results found for: ABORH        RADIOLOGY:  Reviewed all pertinent imaging. Please see official radiology report.    No orders to display         EKG:    none      PROCEDURES:  None      Medical Decision Making    History:  Supplemental history from: Documented in chart, if applicable  External Record(s) reviewed: Documented in chart, if applicable.    Work Up:  Chart documentation includes differential  considered and any EKGs or imaging independently interpreted by provider, where specified.  In additional to work up documented, I considered the following work up: Documented in chart, if applicable.    External consultation:  Discussion of management with another provider: Documented in chart, if applicable    Complicating factors:  Care impacted by chronic illness: Dementia, Heart Disease, and Hypertension  Care affected by social determinants of health: Access to Medical Care    Disposition considerations: Admit.      Nyasia Christianson M.D. [facility]  Emergency Medicine and Medical Toxicology  [facility] EMERGENCY ROOM  [address] [place] [id number] [phone]  Dept: [phone]           Nyasia Christianson MD  09/02/23 7286

## 2023-09-02 NOTE — ED TRIAGE NOTES
Arrives via EMS after being discharged from [address] to a assisted living facility. However, when EMS arrived to [facility] the facility  would not accept  pt because oxygen had not been ordered or set up at facility.  Pt using oxygen as needed. Per EMS, pt oxygen sats without oxygen were in the 80's without oxygen so EMS placed pt on 2 L of oxygen via nasal canula and  pt's sats increased to >93%.            Triage Assessment       Row Name 09/02/23 1548       Triage Assessment (Adult)    Airway WDL WDL       Respiratory WDL    Respiratory WDL rhythm/pattern    Rhythm/Pattern, Respiratory shortness of breath       Breath Sounds    Breath Sounds All Fields    All Lung Fields Breath Sounds diminished       Skin Circulation/Temperature WDL    Skin Circulation/Temperature WDL WDL       Cardiac WDL    Cardiac WDL WDL       Peripheral/Neurovascular WDL    Peripheral Neurovascular WDL pulse assessment;neurovascular assessment lower    Pulse Assessment dorsalis pedis       LLE Neurovascular Assessment    Temperature LLE warm    Color LLE blotchy       RLE Neurovascular Assessment    Temperature RLE warm    Color RLE blotchy       Pulse Dorsalis Pedis    Left Dorsalis Pedis Pulse 1+ (weak)    Right Dorsalis Pedis Pulse 1+ (weak)       Cognitive/Neuro/Behavioral WDL    Cognitive/Neuro/Behavioral WDL orientation    Orientation disoriented to;place;time;situation

## 2023-09-02 NOTE — PROGRESS NOTES
[facility] MEDICINE PROGRESS NOTE      Identification/Summary: Juan Tee is a 87 year old male with a past medical history of  essential hypertension, dyslipidemia, coronary artery disease, polymyositis, Parkinson's disease/Lewy body dementia, CKD 2, bilateral pedal edema, lives at [facility] since 12/2022 came with generalized weakness, recurrent fall.  Found to have community-acquired pneumonia.  Started on IV ceftriaxone and Zithromax--> doxycycline for 1 week.  He was getting agitated easily.  On Zyprexa 5 mg at bedtime chronically.  Started on PO/IV Ativan  as needed, IV Haldol as needed.  Family is considering hospice.  Hospice consultation requested.  IV fluid, IV antibiotic, chronic home medications discontinued on 9/1.  Patient become more alert, communicative on 9/2.  Appetite improved.  Antibiotic and chronic home medications restarted.  Started on Aricept 5 mg daily for dementia, p.o. Ativan and Haldol as needed, Zyprexa as needed and 5 mg at bedtime.  Patient will return to [facility] nursing home.  Hospice referral at the facility.  Niece is the contact. Niece and  multiple other family members are present    Assessment and Plan:  Generalized weakness and deconditioning  Recurrent fall  Gait instability   Using walker  PT and OT     Acute metabolic encephalopathy/delirium  Recurrent agitation  Resume Zyprexa 5 mg at bedtime  Proximal to 2.5 mg every 6 hours as needed  Ativan 0.5 mg solution every 6 hours as needed  IV Ativan 0.5 mg every 4 hours as needed  IV Haldol 0.5 mg every 6 hours as needed  Aricept 5 mg at bedtime    Community-acquired pneumonia  Chest x-ray-There some underlying mild fibrotic change in both lung bases similar to previous with new patchy infiltrates in the right midlung concerning for a new superimposed pneumonia.   No URI symptoms  Status post IV ceftriaxone and Zithromax x 2 days..  Discontinued on 9/1 as considering comfort  "care  Doxycycline for 1 week restarted on 9/2      Parkinson's disease/Lewy body dementia  Carbidopa levodopa 25/100 mg 3 times a day     Essential hypertension  Labile hypertension  Not on antihypertensive  On Florinef 0.2 mg daily  S/P IV hydration     Dyslipidemia  Lipitor 10 mg daily     Coronary artery disease without angina  Stable and asymptomatic  Resume statin and aspirin.  Not on beta-blocker    Chronic systolic CHF   Bilateral lower extremity and pedal edema  Echocardiogram EF 40 to 45%, mild to moderate global hypokinesia of left ventricle, moderately dilated right atrium, elevated right ventricular pressure  No acute pulmonary congestion  Not on diuretic at present     CKD 2  Cr is stable     Polymyositis  Was on azathioprine     Iron deficiency anemia  IV iron 500 mg once      Clinically Significant Risk Factors        # Hypokalemia: Lowest K = 3.3 mmol/L in last 2 days, will replace as needed  # Hypernatremia: Highest Na = 147 mmol/L in last 2 days, will monitor as appropriate      # Hypoalbuminemia: Lowest albumin = 2.6 g/dL at 8/30/2023 11:51 AM, will monitor as appropriate           # Hypertension: Noted on problem list     # Dementia: noted on problem list      # Overweight: Estimated body mass index is 26.01 kg/m  as calculated from the following:    Height as of this encounter: 1.803 m (5' 11\").    Weight as of this encounter: 84.6 kg (186 lb 8 oz).  , PRESENT ON ADMISSION            Code DNR  Hospice consultation requested  DVT prophylaxis  Subcu heparin discontinued  Barrier to discharge  Awaiting for hospice consult.  Anticipated discharge in 1 day      Ashleigh Vazquez MD  [facility]  [facility]  Phone: #[phone]  Securely message with the Vocera Web Console (learn more here)  Text page via [facility] Paging/Directory     Interval History/Subjective:  Resting comfortably.  Patient is more alert and communicative.  Appetite is stable.  Eating well.  " Afebrile. Multiple family members are present.       Physical Exam/Objective:  Temp:  [98.3  F (36.8  C)] 98.3  F (36.8  C)  Pulse:  [82-84] 82  Resp:  [16-20] (P) 16  BP: (109-138)/(59-73) 109/59  SpO2:  [83 %-95 %] 95 %  Body mass index is 26.01 kg/m .    GENERAL:  Alert, appears comfortable, in no acute distress, appears stated age, agitated easily when wake up, sleeping comfortably   HEAD:  Normocephalic, without obvious abnormality, atraumatic   EYES:  PERRL, conjunctiva clear,  EOM's intact   NOSE: Nares normal,  mucosa normal, no drainage   THROAT: Lips, mucosa,  gums normal, mouth moist   NECK: Supple, symmetrical, trachea midline   BACK:   Symmetric, no curvature, ROM normal   LUNGS:   Clear to auscultation bilaterally, no rales, rhonchi, or wheezing, symmetric chest rise on inhalation, respirations unlabored   CHEST WALL:  No tenderness or deformity   HEART:  Irregular rate and rhythm, S1 and S2 normal, no murmur    ABDOMEN:   Soft, non-tender, bowel sounds active , no masses, no organomegaly, no rebound or guarding   EXTREMITIES: 2+ BLE edema    SKIN: No rashes   NEURO: Alert, not oriented x3, moves all four extremities freely   PSYCH: Agitated easily     Data reviewed today: I personally reviewed all new medications, labs, imaging/diagnostics reports over the past 24 hours. Pertinent findings include:    Imaging:   Head CT  1.  No evidence of acute intracranial hemorrhage.  2.  No evidence of acute calvarial fracture.  3.  Age-related change.     Lumber spine xray  Straightening of usual lumbar lordosis without significant spondylolisthesis. No acute fracture. Multilevel degenerative change most prominent at L3-L4, L4-L5 L5-S1 where there is moderate disc height loss and anterior osteophyte formation.      Ankle xray  Plantar and Achilles calcaneal spurring. No evidence for fracture. Ankle mortise is intact and symmetric.      CXR  There some underlying mild fibrotic change in both lung bases similar to  previous with new patchy infiltrates in the right midlung concerning for a new superimposed pneumonia. Healing fracture distal left clavicle.      EKG Results: Sinus rhythm, heart rate 78/min    Echocardiogram  The left ventricle is normal in size.  There is mild concentric left ventricular hypertrophy.  The visual ejection fraction is 40-45%.  There is mild-moderate global hypokinesia of the left ventricle.  The right ventricle is mild to moderately dilated.  The right ventricle is not well visualized.  Mildly decreased right ventricular systolic function  The right atrium is mild to moderately dilated.  The right ventricular systolic pressure is approximated at 41mmHg plus the  right atrial pressure.  Compared to echo from 1/13/22 the findings are similar. RVSP estimate is now  elevated.    Labs:  Most Recent 3 CBC's:  Recent Labs   Lab Test 09/02/23  0548 08/31/23  0622 08/30/23  1151 07/31/23  0923   WBC  --  5.7 7.4 5.8   HGB  --  9.5* 10.8* 12.8*   MCV  --  98 96 102*    250 270 257       Most Recent 3 BMP's:  Recent Labs   Lab Test 09/01/23  0735 08/31/23  1500 08/31/23  1436 08/31/23  0622 08/30/23  1151   *  --   --  149* 143   POTASSIUM 3.3*  --   --  4.1 4.1   CHLORIDE 113*  --   --  113* 110*   CO2 24  --   --  25 26   BUN 12  --   --  17 18   CR 0.86  --   --  0.96 0.95   ANIONGAP 10  --   --  11 7   KACIE 7.6*  --   --  7.8* 8.2*    148* 67* 79 103       Most Recent 2 LFT's:  Recent Labs   Lab Test 08/30/23  1151 07/31/23  0923   AST 21 24   ALT <9 6   ALKPHOS 94 99   BILITOTAL 0.8 0.4         Medications:   Personally Reviewed.  Medications        aspirin  81 mg Oral QPM    atorvastatin  10 mg Oral QPM    calcium carbonate  500 mg Oral Daily    carbidopa-levodopa  1 tablet Oral TID    donepezil  5 mg Oral At Bedtime    doxycycline hyclate  100 mg Oral Q12H [facility] (08/20)    fludrocortisone  0.2 mg Oral Daily    multivitamin  with lutein   Oral BID    multivitamin, therapeutic  1 tablet  Oral Daily    OLANZapine  5 mg Oral At Bedtime    polyethylene glycol  17 g Oral Daily    sodium chloride (PF)  3 mL Intracatheter Q8H    sodium chloride (PF)  3 mL Intracatheter Q8H      Total time spent 50 min

## 2023-09-02 NOTE — PLAN OF CARE
"  Problem: Risk for Delirium  Goal: Improved Behavioral Control  Outcome: Progressing  Intervention: Minimize Safety Risk  Recent Flowsheet Documentation  Taken 9/2/2023 0017 by Nikole Rivera RN  Enhanced Safety Measures: room near unit station  Taken 9/1/2023 1952 by Nikole Rivera RN  Enhanced Safety Measures: room near unit station     Problem: Gas Exchange Impaired  Goal: Optimal Gas Exchange  Outcome: Progressing  Intervention: Optimize Oxygenation and Ventilation  Recent Flowsheet Documentation  Taken 9/2/2023 0017 by Nikole Rivera RN  Head of Bed (HOB) Positioning: HOB at 20-30 degrees  Taken 9/1/2023 1952 by Nikole Rivera RN  Head of Bed (HOB) Positioning: HOB at 20-30 degrees   Goal Outcome Evaluation:       Pt remains confused but more alert and responsive this shift. Pt able to make some needs known like he \"wants to poop, put me in a potty.\" Pt also asked to drink. Pt's O2 Sats was 83% but was non compliant with the use of oxygen. Pt' tried to remove nasal cannula and gets restless and agitated. Pt had 1x dose each of prescribed Lorazepam and Haldol. Pt denies difficulty breathing when asked. Pt had bowel movements twice, small to moderate amount. Doubly incontinent of urine and feces.VSS.  Potential discharge to [facility] today. CM will call kyra re plan.               "

## 2023-09-03 NOTE — PROGRESS NOTES
BP (!) 154/81 (BP Location: Right leg, Patient Position: Semi-Farnsworth's, Cuff Size: Adult Regular)   Pulse 81   Temp 97.1  F (36.2  C) (Oral)   Resp 20   SpO2 91%       The PT was provided a neb per MD order. While the PT had on/off UAW wheezes, the lung fields c/o widely scattered and faint crackles. None of this was changed by the neb. RT will follow as directed.

## 2023-09-03 NOTE — PLAN OF CARE
Goal Outcome Evaluation:  Pt discharged back to [facility] via EMS stretcher.  Report was already given to Lauar at [facility].  Provided 1x PRN Zyprexa 2.5mg at 10am due to restlessness, Zyprexa provided relief and pt was more comfortable after. Report given to EMS and packet given to them.

## 2023-09-03 NOTE — PROGRESS NOTES
Occupational Therapy Discharge Summary    Reason for therapy discharge:    Discharged to home.    Progress towards therapy goal(s). See goals on Care Plan in [facility] electronic health record for goal details.  Goals not met.  Barriers to achieving goals:   discharge from facility.    Therapy recommendation(s):    Continued therapy is recommended.  Rationale/Recommendations:  To increase indep with functinal trsfs and ADLs.

## 2023-09-03 NOTE — PROGRESS NOTES
Called report to Laura at [facility].      Jyotsna Laguna was here and provided her with updates as well.

## 2023-09-03 NOTE — PLAN OF CARE
"  Problem: Plan of Care - These are the overarching goals to be used throughout the patient stay.    Goal: Optimal Comfort and Wellbeing  Outcome: Progressing   Goal Outcome Evaluation:       Pt has been lucid at start of shift, conversant and knows the month and year. Pt was aware that he's in the hospital. However, pt has been sleeping on and off, confused and talking about getting dress and going to work. Pt had bowel movement several times, small to moderate amount, and gets restless every time, trying to get out of bed. Pt removing nasal cannula, dropping sats below 85%. Olanzapine given at bedtime but seemed ineffective. Lorazepam not effective too. Pt still awake and settled, eyes open  while looking up the ceiling, asking what is the \"ladder\" doing up there.Reoriented pt several times. Educated importance to keep oxygen on but not understanding.Pt removed pulse oximeter from finger and toe.   Pt discharging today to [facility].                  "

## 2023-09-03 NOTE — PROGRESS NOTES
Care Management Follow Up    Length of Stay (days): 0    Expected Discharge Date: 09/03/2023     Concerns to be Addressed:       Patient plan of care discussed at interdisciplinary rounds: Yes    Anticipated Discharge Disposition: [facility]  Anticipated Discharge Services:  long term, Hospice  Anticipated Discharge DME:  (per hospice)    Patient/family educated on Medicare website which has current facility and service quality ratings: yes (niece/Luz Elena is OK with any hospice agency other than [facility])  Education Provided on the Discharge Plan: Yes (CM continue to follow and assist with D/C planning. AVS will be per bedside RN.)  Patient/Family in Agreement with the Plan: yes (niece/[facility] Luz Elena)    Referrals Placed by CM/SW: Hospice  Private pay costs discussed: transportation costs    Additional Information:    8:49 AM  Nurse Laura at [facility] [phone] - left voicemail, needing to confirm delivery of O2 via [facility], and to confirm discharge plans this morning.    9:11 AM   [facility] - Nurse April is confirming discharge plans with hospice care team, and delivery of O2 via hospice; return call pending.    Spoke to Dr. Gillis, who wants to ensure pt has been reviewed by a Hospice medical director for definite acceptance prior to discharge, for pt safety.    Spoke to kyra Laguna, who prefers [facility].    9:29 AM  [facility] - Nurse Sharita is having their medical director review this patient to see if pt qualifies for hospice.    9:30 AM  [facility] Stretcher - Rescheduled to later this afternoon 9/3, pickup 14:42-15:27pm. PCS done.    9:54 AM  [facility] - Nurse Sharita reports their medical director reviewed this pt, and pt does qualify for hospice, [facility] accepts this patient.  Spoke to Sharita and kyra Laguna in a 3-way phone call.  Sharita will arrange for oxygen, broda chair, and hospital bed  to [facility]; address for delivery confirmed via kyra Laguna.  Updated Luz Elena and Sharita on [facility] transport time; both agreeable to this discharge plan.  Nurse Sharita to meet Luz Elena at [facility] at 15:30-16:00pm for admit to hospice visit.    10:04 AM  Nurse Sanchez at [facility] [phone], Fax [phone] - Spoke to Nurse Sanchez who is agreeable to this discharge plan.    10:28 AM  Update given to Estrella at [facility].  Update given to Dr. Gillis.    11:04 AM  Discharge orders received.  Faxed to Nurse Sanchez at [facility].  Called and Notified Laura to connect with Trell regarding delivery of meds.    Copy of discharge orders faxed to Nurse Sheriff at [facility] for their review.    Brittnee Van RN

## 2023-09-03 NOTE — PROGRESS NOTES
Care Management Discharge Note    Discharge Date: 09/02/2023       Discharge Disposition: Assisted Living, Hospice (Memory care)    Discharge Services: Transportation Services    Discharge DME: None    Discharge Transportation: agency    Private pay costs discussed: transportation costs    Education Provided on the Discharge Plan: Yes    Persons Notified of Discharge Plans: patient, kyra Laguna    Patient/Family in Agreement with the Plan: yes    Additional Information:    [facility] for today 9[facility] [place]  PCS done.     11:40 AM  Discharge orders received.  Update given to Nurse William at [facility].     [facility] to send in discharge orders to [facility].     Update given to bedside RN, MICHELL, Charge RN.  Update given to Kyra Laguna.    See also CM Progress note 9/2/23     3:46 PM  Writer was notified by BENJI ALLEN that pt is now back in the ED, requiring O2.  Writer confirmed with bedside nursing prior to discharge: pt did not require O2 at time of discharge.     Paged Dr. Vazquez to update her.    Brittnee Van RN

## 2023-09-03 NOTE — DISCHARGE SUMMARY
[facility] MEDICINE  DISCHARGE SUMMARY     Primary Care Physician: XAVIER Nye  Admission Date: 9/2/2023   Discharge Provider: Karely Gillis MD Discharge Date: 9/3/2023   Diet:   Active Diet and Nourishment Order   Procedures    Regular Diet Adult    Diet       Code Status: No CPR- Do NOT Intubate   Activity:         Condition at Discharge: stable     REASON FOR PRESENTATION(See Admission Note for Details)     hypoxia    PRINCIPAL & ACTIVE DISCHARGE DIAGNOSES     Hypoxia due to pneumonia  Generalized weakness and deconditioning  Recurrent fall  Gait instability  Acute metabolic encephalopathy/delirium  Recurrent agitation  Community-acquired pneumonia   Coronary artery disease without angina   Chronic systolic CHF   Bilateral lower extremity and pedal edema  Dyslipidemia  Essential hypertension  Labile hypertension  Parkinson's disease/Lewy body dementia   CKD 2   Polymyositis   Iron deficiency anemia         RECOMMENDATIONS TO OUTPATIENT PROVIDER FOR F/U VISIT       Follow up per [facility]    DISPOSITION         SUMMARY OF HOSPITAL COURSE:      87 year old male with a return to [facility] after being discharged from  Our facility for pneumonia.  PMHx includes CAD, HTN, lewy body dementia, ckd, polymyositis.  He was hospitalized here from 8/30 to 9/2 after being  Admitted for pneumonia.  Pt had an uncomplicated hospital course; being  Supported with iv antibiotics rocephin and azithromycin.  He was changed  To oral doxycycline. He was titrated off his oxygen.  Upon arrival to his  Memory care facility the team discovered the patient was on 2L nasal cannula  Oxygen EMS had placed en route.  Pt was returned to our facility since  The oxygen need was a change in his baseline medical status.    On arrival to our ER he continued to be on 2 liters oxygen.  XR chest showed  No additional infilitrates.  I spoke with Luz Elena the patients niece and  surrogate  Decision maker who was angry he was discharged without being signed  Onto an accepting hospice.  She felt his discharge was premature; without  All his needs being met.  She stated very clearly that before he was  To be discharged again he needed to be signed onto an accepting hospice.    Medically he had no new diagnosis.  See previous discharge summary by  Dr Sotelo.  He was seen by the case management team who worked  Diligently regarding obtaining an accepting hospice.  [facility]  [facility] who will arrange for him oxygen at his assisted  Living along with ongoing services.  He had a short hospital stay here; it was  Uncomplicated.      Discharge Medications with Med changes:     Current Discharge Medication List        CONTINUE these medications which have NOT CHANGED    Details   acetaminophen (TYLENOL) 500 MG tablet Take 1,000 mg by mouth 3 times daily as needed for pain      aspirin 81 MG EC tablet Take 1 tablet (81 mg) by mouth daily      atorvastatin (LIPITOR) 10 MG tablet Take 1 tablet (10 mg) by mouth every evening      azaTHIOprine (IMURAN) 50 MG tablet Take 1 tablet (50 mg) by mouth daily      calcium carbonate (OS-KACIE) 1500 (600 Ca) MG tablet Take 1 tablet (600 mg) by mouth daily      carbidopa-levodopa (SINEMET)  MG tablet Take 1 tablet by mouth 3 times per day 4 - 5 hours apart.  Qty: 270 tablet, Refills: 3    Associated Diagnoses: Parkinson's disease (H)      DOCOSAHEXANOIC ACID/EPA (FISH OIL ORAL) [DOCOSAHEXANOIC ACID/EPA (FISH OIL ORAL)] Take 1,200 mg by mouth daily.      donepezil (ARICEPT) 5 MG tablet Take 1 tablet (5 mg) by mouth At Bedtime    Associated Diagnoses: Senile dementia (H)      doxycycline hyclate (VIBRAMYCIN) 100 MG capsule Take 1 capsule (100 mg) by mouth every 12 hours for 6 days  Qty: 12 capsule, Refills: 0    Associated Diagnoses: Pneumonia due to infectious organism, unspecified laterality, unspecified part of lung      fludrocortisone (FLORINEF)  0.1 MG tablet Take 0.2 mg by mouth daily      folic acid (FOLVITE) 1 MG tablet Take 1 tablet (1 mg) by mouth daily      LORazepam (ATIVAN) 0.5 MG tablet Take 1 tablet (0.5 mg) by mouth every 6 hours as needed for anxiety  Qty: 10 tablet, Refills: 0    Associated Diagnoses: Agitation      melatonin 5 MG tablet Take 5 mg by mouth At Bedtime      miconazole (MICATIN) 2 % external powder Apply topically 2 times daily as needed for other or itching (rash)    Associated Diagnoses: Groin rash      multivitamin (CENTRUM SILVER) tablet Take 1 tablet by mouth daily      !! OLANZapine (ZYPREXA) 2.5 MG tablet Take 1 tablet (2.5 mg) by mouth 3 times daily as needed    Associated Diagnoses: Terminal illness      !! OLANZapine (ZYPREXA) 5 MG tablet Take 5 mg by mouth At Bedtime      polyethylene glycol (MIRALAX) 17 GM/Dose powder Take 17 g by mouth daily  Qty: 238 g    Associated Diagnoses: Terminal illness      potassium chloride (KLOR-CON) 20 MEQ packet Take 20 mEq by mouth 2 times daily for 2 days  Qty: 4 packet, Refills: 0    Associated Diagnoses: Hypokalemia      senna-docusate (SENOKOT-S/PERICOLACE) 8.6-50 MG tablet Take 2 tablets by mouth 2 times daily as needed for constipation  Qty: 30 tablet, Refills: 1    Associated Diagnoses: Constipation, unspecified constipation type      VIT C/E/ZN/COPPR/LUTEIN/ZEAXAN (PRESERVISION AREDS 2 ORAL) [VIT C/E/ZN/COPPR/LUTEIN/ZEAXAN (PRESERVISION AREDS 2 ORAL)] Take 1 capsule by mouth 2 (two) times a day.       !! - Potential duplicate medications found. Please discuss with provider.                Consults       CARE MANAGEMENT / SOCIAL WORK IP CONSULT    Immunizations given this encounter     Most Recent Immunizations   Administered Date(s) Administered    COVID-19 Monovalent 18+ (Moderna) 12/01/2021    Influenza Vaccine 65+ (FLUAD) 12/01/2021           Anticoagulation Information      Recent INR results:   Recent Labs   Lab 08/30/23  1151   INR 1.23*     Warfarin doses (if applicable)  or name of other anticoagulant:       SIGNIFICANT IMAGING FINDINGS     Results for orders placed or performed during the hospital encounter of 09/02/23   XR Chest Port 1 View    Impression    IMPRESSION: Heart size prominent on this portable view. Pulmonary vascularity normal. Extensive interstitial densities throughout both lungs compatible with a chronic interstitial fibrosis. Airspace opacities within the right mid lung and both lower lung   fields could represent superimposed pneumonia similar to the prior study. This process could be better characterise with a high-resolution CT chest exam.           Discharge Orders        Reason for your hospital stay    Hypoxia     Follow-up and recommended labs and tests     [facility]     Activity    As tolerated     Diet    regular       Examination   Physical Exam   Temp:  [96.4  F (35.8  C)-98.6  F (37  C)] 96.4  F (35.8  C)  Pulse:  [] 82  Resp:  [16-22] 22  BP: (113-154)/(64-85) 116/65  SpO2:  [84 %-97 %] 93 %  Wt Readings from Last 1 Encounters:   08/30/23 84.6 kg (186 lb 8 oz)       General Appearance: Alert, oriented for me; reports of being disoriented at times; on 2 liters oxygen; 93% oxygen saturaion  Respiratory: no wheezing  Cardiovascular: regular  GI: soft  Skin: no rashes  Neuro: at baseline       Please see EMR for more detailed significant labs, imaging, consultant notes etc.    IKarely MD, personally saw the patient today and spent less than or equal to 30 minutes discharging this patient.    Karely Gillis MD  [facility]    CC:XAVIER Nye

## 2023-09-03 NOTE — PROGRESS NOTES
Care Management Discharge Note    Discharge Date: 09/03/2023       Discharge Disposition: Assisted Living, Hospice (return to [facility] in [place] with added hospice services)    Discharge Services:  (return to [facility] in [place] with added hospice services)    Discharge DME:  (per hospice)    Discharge Transportation: agency ([facility] stretcher for dementia and 02 needs)    Private pay costs discussed: transportation  Does the patient's insurance plan have a 3 day qualifying hospital stay waiver?  No    PAS Confirmation Code:    Patient/family educated on Medicare website which has current facility and service quality ratings: yes (niece/Luz Elena is OK with any hospice agency other than [facility])    Education Provided on the Discharge Plan: Yes (CM continue to follow and assist with D/C planning. AVS will be per bedside RN.)  Persons Notified of Discharge Plans: patient per team, kyra Laguna  Patient/Family in Agreement with the Plan: yes (niece/[facility] Luz Elena)    Handoff Referral Completed: Yes    Additional Information:    Pt discharging back to his home at [facility] in [place].  Transportation today 9/3 via [facility] stretcher, pickup window 14:42-15:27pm.    [facility] will admit to hospice today, Sharita of [facility] to meet pt and kyra Laguna today 9/3 at 15:30-16:00pm for hospice admit visit.    Update given to bedside RN, Charge RN, MICHELL.    Brittnee Van RN

## 2023-09-03 NOTE — PROGRESS NOTES
Pt is currently on a 3L NC Sating 93-97%, HR 63-82, RR 22-24, BBS- Coarse/Diminished.  Duoneb ordered QID.  RT will continue to monitor.

## 2023-09-03 NOTE — PROGRESS NOTES
"Pt discharged and readmitted to the same room at approximately 1845.  Upon arriving to the unit he was reporting there were \"ants everywhere and he wasn't going to do anything until it was cleaned up.\"  This writer asked the pt where he is seeing the ants, he said \"you don't see them, they are everywhere.\"  Informed pt he is admitted to [facility] and there are no ants in his room.  Updated charge OLIVIER MCKEON and  Tory WEBBER.   "

## 2023-09-03 NOTE — PROGRESS NOTES
"PRIMARY DIAGNOSIS: \"GENERIC\" NURSING  OUTPATIENT/OBSERVATION GOALS TO BE MET BEFORE DISCHARGE:  ADLs back to baseline: No    Activity and level of assistance: Up with maximum assistance. PT/OT evaluated, discharging on hospice.    Pain status: Pain free.    Return to near baseline physical activity: No     Discharge Planner Nurse   Safe discharge environment identified: Yes  Barriers to discharge: No       Entered by: Anaid Fine RN 09/03/2023 1:04 PM     Please review provider order for any additional goals.   Nurse to notify provider when observation goals have been met and patient is ready for discharge.  "

## 2023-09-03 NOTE — PROGRESS NOTES
"PRIMARY DIAGNOSIS: \"GENERIC\" NURSING  OUTPATIENT/OBSERVATION GOALS TO BE MET BEFORE DISCHARGE:  ADLs back to baseline: No    Activity and level of assistance: A2 with walker and gait belt.     Pain status: Pain free.    Return to near baseline physical activity: No     Discharge Planner Nurse   Safe discharge environment identified: Yes  Barriers to discharge: No       Entered by: Anaid Fine RN 09/03/2023 11:50 AM     Please review provider order for any additional goals.   Nurse to notify provider when observation goals have been met and patient is ready for discharge.    Pt discharging on hospice today back to his snf.   "

## 2024-04-25 NOTE — PROGRESS NOTES
Care Management Follow Up    Length of Stay (days): 3    Expected Discharge Date: 09/02/2023     Concerns to be Addressed: discharge planning     Patient plan of care discussed at interdisciplinary rounds: Yes    Anticipated Discharge Disposition: Assisted Living, Hospice (Memory care)     Anticipated Discharge Services: Transportation Services  Anticipated Discharge DME: None    Patient/family educated on Medicare website which has current facility and service quality ratings:    Education Provided on the Discharge Plan: Yes  Patient/Family in Agreement with the Plan: yes    Referrals Placed by CM/SW: Hospice  Private pay costs discussed: Not applicable    Additional Information:    Chart reviewed.    Anticipate discharge back to [facility] with [facility].    [facility]: [phone] #2 - Called; left voicemail with [facility] On Call Nurse.  Need to confirm if they can assess/accept this patient this weekend.  CM received an urgent voicemail from [facility] yesterday after 16:00pm stating [facility] will not be able to assess over the weekend.    Yesterday [facility] had faxed in a Physician Certificate of Illness CTI.  Document needs Hospitalist signature.    Paged Dr. Vazquez to come sign CTI document for this hospice agency.    8:58 AM  Received update from Dr. Vazquez, pt is doing better today; does not appear to be imminently dying.  Pt will NOT be discharged with hospice.  Pt can follow up with hospice outpatient.    PT/OT evals pending.    Nataliia On Call Nurse at [facility], [phone].  Weekend Fax [phone]   Per Nataliia - Since pt will not be discharging on Hospice, please send rx's to [facility] (selected as preferred pharmacy in pt's Epic chart).  Home care agency: [facility] or [facility].    OT eval - Home with assist, no homecare at discharge.    Update given to Nurse Miranda at [facility]  Hospice: pt does not need urgent hospice enrollment this weekend.  [place] to contact pt/family/[facility] on Monday or Tuesday.    In anticipation of discharge today, [facility] stretcher transport secured for today 9/2, to [facility] in [place], pickup window 13:42-14:27pm.  PCS done.    11:40 AM  Discharge orders received.  Update given to Nurse William at [facility].    [facility] to send in discharge orders to [facility].    Update given to bedside RN, [facility] [place] [facility], Charge RN.  Update given to Jyotsna Laguna.    3:46 PM  Writer was notified by BENJI ALLEN that pt is now back in the ED, requiring O2.  Writer confirmed with bedside nursing prior to discharge: pt did not require O2 at time of discharge.    Paged Dr. Vazquez to update her.    Brittnee Van RN     Patient is tentatively scheduled for  surgery- microdirect laryngoscopy with excision and injection with Dr Morse- 05/3/24.    Pre-op instructions provided. Pt given verbal and written instructions with teach back on pepcid. Pt verbalized understanding with return demonstration.    no labs warranted due to minimaly invasive surgery.  Patient also had recent right foot surgery-Right foot submetatarsal 5 lesion excision and plantar condylectomy of 5th metatarsal head.- 03/6/24. denies any post operative complications

## 2025-06-09 NOTE — PLAN OF CARE
Problem: Dementia Signs/Symptoms  Goal: Improved Behavioral Control (Dementia Signs/Symptoms)  Outcome: Progressing     Problem: Pain Acute  Goal: Optimal Pain Control and Function  Outcome: Progressing     Patient vital signs are at baseline: Yes  Patient able to ambulate as they were prior to admission or with assist devices provided by therapies during their stay:  Yes  Patient MUST void prior to discharge:  Yes  Patient able to tolerate oral intake:  Yes  Pain has adequate pain control using Oral analgesics:  Yes  Does patient have an identified :  Yes  Has goal D/C date and time been discussed with patient:  Yes  Goal Outcome Evaluation:    Denies pain. VSS. Mood and cognition WDL this evening. Scheduled Zyprexa ODT administered. Regular diet, good appetite. SBA, tolerates well. Voiding adequately.   What Type Of Note Output Would You Prefer (Optional)?: Bullet Format How Severe Is Your Rash?: severe Is This A New Presentation, Or A Follow-Up?: Rash Additional History: Pt states she was helping her   branches, it started on her right upper shoulder and traveled down to her stomach